# Patient Record
Sex: FEMALE | Race: WHITE | NOT HISPANIC OR LATINO | Employment: UNEMPLOYED | URBAN - NONMETROPOLITAN AREA
[De-identification: names, ages, dates, MRNs, and addresses within clinical notes are randomized per-mention and may not be internally consistent; named-entity substitution may affect disease eponyms.]

---

## 2017-01-24 ENCOUNTER — APPOINTMENT (OUTPATIENT)
Dept: PREADMISSION TESTING | Facility: HOSPITAL | Age: 42
End: 2017-01-24

## 2017-01-24 RX ORDER — LISINOPRIL 10 MG/1
10 TABLET ORAL DAILY
COMMUNITY
End: 2019-09-19

## 2017-01-27 ENCOUNTER — TRANSCRIBE ORDERS (OUTPATIENT)
Dept: INFUSION THERAPY | Facility: HOSPITAL | Age: 42
End: 2017-01-27

## 2017-01-27 DIAGNOSIS — D69.3 IMMUNE THROMBOCYTOPENIC PURPURA (HCC): Primary | ICD-10-CM

## 2017-02-01 ENCOUNTER — HOSPITAL ENCOUNTER (OUTPATIENT)
Dept: INFUSION THERAPY | Facility: HOSPITAL | Age: 42
Discharge: HOME | End: 2017-02-01
Attending: INTERNAL MEDICINE | Admitting: INTERNAL MEDICINE

## 2017-02-01 VITALS
WEIGHT: 153 LBS | DIASTOLIC BLOOD PRESSURE: 63 MMHG | HEIGHT: 67 IN | RESPIRATION RATE: 16 BRPM | BODY MASS INDEX: 24.01 KG/M2 | TEMPERATURE: 98.3 F | SYSTOLIC BLOOD PRESSURE: 106 MMHG | HEART RATE: 81 BPM

## 2017-02-01 DIAGNOSIS — D69.3 IMMUNE THROMBOCYTOPENIC PURPURA (HCC): ICD-10-CM

## 2017-02-01 LAB
ABO GROUP BLD: NORMAL
BASOPHILS # BLD AUTO: 0.01 10*3/MM3 (ref 0–0.3)
BASOPHILS NFR BLD AUTO: 0.2 % (ref 0–2)
BLD GP AB SCN SERPL QL: NEGATIVE
DEPRECATED RDW RBC AUTO: 44 FL (ref 37–54)
EOSINOPHIL # BLD AUTO: 0.03 10*3/MM3 (ref 0–0.7)
EOSINOPHIL NFR BLD AUTO: 0.6 % (ref 0–5)
ERYTHROCYTE [DISTWIDTH] IN BLOOD BY AUTOMATED COUNT: 14.3 % (ref 11.5–14.5)
HCT VFR BLD AUTO: 40.2 % (ref 37–47)
HGB BLD-MCNC: 13.7 G/DL (ref 12–16)
IMM GRANULOCYTES # BLD: 0.02 10*3/MM3 (ref 0–0.03)
IMM GRANULOCYTES NFR BLD: 0.4 % (ref 0–0.5)
LYMPHOCYTES # BLD AUTO: 1.05 10*3/MM3 (ref 1–3)
LYMPHOCYTES NFR BLD AUTO: 21.3 % (ref 21–51)
MCH RBC QN AUTO: 28.8 PG (ref 27–33)
MCHC RBC AUTO-ENTMCNC: 34.1 G/DL (ref 33–37)
MCV RBC AUTO: 84.5 FL (ref 80–94)
MONOCYTES # BLD AUTO: 0.5 10*3/MM3 (ref 0.1–0.9)
MONOCYTES NFR BLD AUTO: 10.1 % (ref 0–10)
NEUTROPHILS # BLD AUTO: 3.33 10*3/MM3 (ref 1.4–6.5)
NEUTROPHILS NFR BLD AUTO: 67.4 % (ref 30–70)
PLATELET # BLD AUTO: 135 10*3/MM3 (ref 130–400)
PLATELET # BLD AUTO: 54 10*3/MM3 (ref 130–400)
PMV BLD AUTO: 10.6 FL (ref 6–10)
RBC # BLD AUTO: 4.76 10*6/MM3 (ref 4.2–5.4)
RH BLD: NEGATIVE
WBC NRBC COR # BLD: 4.94 10*3/MM3 (ref 4.5–12.5)

## 2017-02-01 PROCEDURE — 86850 RBC ANTIBODY SCREEN: CPT

## 2017-02-01 PROCEDURE — 63710000001 DIPHENHYDRAMINE PER 50 MG: Performed by: INTERNAL MEDICINE

## 2017-02-01 PROCEDURE — 86900 BLOOD TYPING SEROLOGIC ABO: CPT

## 2017-02-01 PROCEDURE — 86901 BLOOD TYPING SEROLOGIC RH(D): CPT

## 2017-02-01 PROCEDURE — 85049 AUTOMATED PLATELET COUNT: CPT | Performed by: INTERNAL MEDICINE

## 2017-02-01 PROCEDURE — 85025 COMPLETE CBC W/AUTO DIFF WBC: CPT | Performed by: INTERNAL MEDICINE

## 2017-02-01 PROCEDURE — P9035 PLATELET PHERES LEUKOREDUCED: HCPCS

## 2017-02-01 PROCEDURE — P9037 PLATE PHERES LEUKOREDU IRRAD: HCPCS

## 2017-02-01 PROCEDURE — 96374 THER/PROPH/DIAG INJ IV PUSH: CPT

## 2017-02-01 PROCEDURE — 96375 TX/PRO/DX INJ NEW DRUG ADDON: CPT

## 2017-02-01 PROCEDURE — 36430 TRANSFUSION BLD/BLD COMPNT: CPT

## 2017-02-01 PROCEDURE — 25010000002 METHYLPREDNISOLONE PER 125 MG: Performed by: INTERNAL MEDICINE

## 2017-02-01 RX ORDER — DIPHENHYDRAMINE HCL 25 MG
25 CAPSULE ORAL ONCE
Status: COMPLETED | OUTPATIENT
Start: 2017-02-01 | End: 2017-02-01

## 2017-02-01 RX ORDER — FAMOTIDINE 10 MG/ML
20 INJECTION, SOLUTION INTRAVENOUS ONCE
Status: COMPLETED | OUTPATIENT
Start: 2017-02-01 | End: 2017-02-02

## 2017-02-01 RX ORDER — ACETAMINOPHEN 325 MG/1
650 TABLET ORAL ONCE
Status: COMPLETED | OUTPATIENT
Start: 2017-02-01 | End: 2017-02-01

## 2017-02-01 RX ORDER — METHYLPREDNISOLONE SODIUM SUCCINATE 125 MG/2ML
125 INJECTION, POWDER, LYOPHILIZED, FOR SOLUTION INTRAMUSCULAR; INTRAVENOUS ONCE
Status: COMPLETED | OUTPATIENT
Start: 2017-02-01 | End: 2017-02-01

## 2017-02-01 RX ADMIN — ACETAMINOPHEN 650 MG: 325 TABLET ORAL at 11:34

## 2017-02-01 RX ADMIN — FAMOTIDINE 20 MG: 10 INJECTION, SOLUTION INTRAVENOUS at 11:36

## 2017-02-01 RX ADMIN — METHYLPREDNISOLONE SODIUM SUCCINATE 125 MG: 125 INJECTION, POWDER, FOR SOLUTION INTRAMUSCULAR; INTRAVENOUS at 11:36

## 2017-02-01 RX ADMIN — DIPHENHYDRAMINE HYDROCHLORIDE 25 MG: 25 CAPSULE ORAL at 11:37

## 2017-02-01 NOTE — PATIENT INSTRUCTIONS
Methylprednisolone Solution for Injection  What is this medicine?  METHYLPREDNISOLONE (meth ill pred NISS oh lone) is a corticosteroid. It is commonly used to treat inflammation of the skin, joints, lungs, and other organs. Common conditions treated include asthma, allergies, and arthritis. It is also used for other conditions, such as blood disorders and diseases of the adrenal glands.  This medicine may be used for other purposes; ask your health care provider or pharmacist if you have questions.  What should I tell my health care provider before I take this medicine?  They need to know if you have any of these conditions:  -cataracts or glaucoma  -Cushing's syndrome  -heart disease  -high blood pressure  -infection including tuberculosis  -low calcium or potassium levels in the blood  -recent surgery  -seizures  -stomach or intestinal disease, including colitis  -threadworms  -thyroid problems  -an unusual or allergic reaction to methylprednisolone, corticosteroids, benzyl alcohol, other medicines, foods, dyes, or preservatives  -pregnant or trying to get pregnant  -breast-feeding  How should I use this medicine?  This medicine is for injection or infusion into a vein. It is also for injection into a muscle. It is given by a health care professional in a hospital or clinic setting.  Talk to your pediatrician regarding the use of this medicine in children. While this drug may be prescribed for selected conditions, precautions do apply.  Overdosage: If you think you have taken too much of this medicine contact a poison control center or emergency room at once.  NOTE: This medicine is only for you. Do not share this medicine with others.  What if I miss a dose?  This does not apply.  What may interact with this medicine?  Do not take this medicine with any of the following medications:  -mifepristone  This medicine may also interact with the following medications:  -aspirin and aspirin-like  medicines  -cyclosporin  -ketoconazole  -phenobarbital  -phenytoin  -rifampin  -tacrolimus  -troleandomycin  -vaccines  -warfarin  This list may not describe all possible interactions. Give your health care provider a list of all the medicines, herbs, non-prescription drugs, or dietary supplements you use. Also tell them if you smoke, drink alcohol, or use illegal drugs. Some items may interact with your medicine.  What should I watch for while using this medicine?  Visit your doctor or health care professional for regular checks on your progress. If you are taking this medicine for a long time, carry an identification card with your name and address, the type and dose of your medicine, and your doctor's name and address.  The medicine may increase your risk of getting an infection. Stay away from people who are sick. Tell your doctor or health care professional if you are around anyone with measles or chickenpox.  You may need to avoid some vaccines. Talk to your health care provider for more information.  If you are going to have surgery, tell your doctor or health care professional that you have taken this medicine within the last twelve months.  Ask your doctor or health care professional about your diet. You may need to lower the amount of salt you eat.  The medicine can increase your blood sugar. If you are a diabetic check with your doctor if you need help adjusting the dose of your diabetic medicine.  What side effects may I notice from receiving this medicine?  Side effects that you should report to your doctor or health care professional as soon as possible:  -allergic reactions like skin rash, itching or hives, swelling of the face, lips, or tongue  -bloody or tarry stools  -changes in vision  -eye pain or bulging eyes  -fever, sore throat, sneezing, cough, or other signs of infection, wounds that will not heal  -increased thirst  -irregular heartbeat  -muscle cramps  -pain in hips, back, ribs, arms,  shoulders, or legs  -swelling of the ankles, feet, hands  -trouble passing urine or change in the amount of urine  -unusual bleeding or bruising  -unusually weak or tired  -weight gain or weight loss  Side effects that usually do not require medical attention (report to your doctor or health care professional if they continue or are bothersome):  -changes in emotions or moods  -constipation or diarrhea  -headache  -irritation at site where injected  -nausea, vomiting  -skin problems, acne, thin and shiny skin  -trouble sleeping  -unusual hair growth on the face or body  This list may not describe all possible side effects. Call your doctor for medical advice about side effects. You may report side effects to FDA at 0-892-FDA-4923.  Where should I keep my medicine?  This drug is given in a hospital or clinic and will not be stored at home.  NOTE: This sheet is a summary. It may not cover all possible information. If you have questions about this medicine, talk to your doctor, pharmacist, or health care provider.     © 2016, [employer]. (2013-09-17 11:37:16)  Famotidine injection  What is this medicine?  FAMOTIDINE (fa STEPH ti noah) is a type of antihistamine that blocks the release of stomach acid. It is used to treat stomach or intestinal ulcers. It can relieve ulcer pain and discomfort, and the heartburn from acid reflux.  This medicine may be used for other purposes; ask your health care provider or pharmacist if you have questions.  What should I tell my health care provider before I take this medicine?  They need to know if you have any of these conditions:  -kidney or liver disease  -an unusual or allergic reaction to famotidine, other medicines, foods, dyes, or preservatives  -pregnant or trying to get pregnant  -breast-feeding  How should I use this medicine?  This medicine is for infusion into a vein. It is given by a health care professional in a hospital or clinic setting.  Talk to your  pediatrician regarding the use of this medicine in children. Special care may be needed.  Overdosage: If you think you have taken too much of this medicine contact a poison control center or emergency room at once.  NOTE: This medicine is only for you. Do not share this medicine with others.  What if I miss a dose?  This does not apply.  What may interact with this medicine?  -delavirdine  -itraconazole  -ketoconazole  This list may not describe all possible interactions. Give your health care provider a list of all the medicines, herbs, non-prescription drugs, or dietary supplements you use. Also tell them if you smoke, drink alcohol, or use illegal drugs. Some items may interact with your medicine.  What should I watch for while using this medicine?  Tell your doctor or health care professional if your condition does not start to get better or gets worse.  Do not take with aspirin, ibuprofen, or other antiinflammatory medicines. These can aggravate your condition.  Do not smoke cigarettes or drink alcohol. These increase irritation in your stomach and can increase the time it will take for ulcers to heal. Cigarettes and alcohol can also worsen acid reflux or heartburn.  If you get black, tarry stools or vomit up what looks like coffee grounds, call your doctor or health care professional at once. You may have a bleeding ulcer.  What side effects may I notice from receiving this medicine?  Side effects that you should report to your doctor or health care professional as soon as possible:  -allergic reactions like skin rash, itching or hives, swelling of the face, lips, or tongue  -agitation, nervousness  -confusion  -hallucinations  Side effects that usually do not require medical attention (report to your doctor or health care professional if they continue or are bothersome):  -constipation  -diarrhea  -dizziness  -headache  This list may not describe all possible side effects. Call your doctor for medical advice  about side effects. You may report side effects to FDA at 3-729-FDA-3599.  Where should I keep my medicine?  This medicine is given in a hospital or clinic. You will not be given this medicine to store at home.  NOTE: This sheet is a summary. It may not cover all possible information. If you have questions about this medicine, talk to your doctor, pharmacist, or health care provider.     © 2016, [employer]/Gold Standard. (2009-04-22 13:24:51)          Acetaminophen oral solution  What is this medicine?  ACETAMINOPHEN (a set a KERMIT nasima fen) is a pain reliever. It is used to treat mild pain and fever.  This medicine may be used for other purposes; ask your health care provider or pharmacist if you have questions.  What should I tell my health care provider before I take this medicine?  They need to know if you have any of these conditions:  -if you often drink alcohol  -liver disease  -phenylketonuria  -an unusual or allergic reaction to acetaminophen, other medicines, foods, dyes, or preservatives  -pregnant or trying to get pregnant  -breast-feeding  How should I use this medicine?  Take this medicine by mouth. This medicine comes in more than one concentration. Check the concentration on the label before every dose to make sure you are giving the right dose. Follow the directions on the package or prescription label. Use a specially marked spoon or dropper to measure each dose. Ask your pharmacist if you do not have one. Household spoons are not accurate. Do not take your medicine more often than directed.  Talk to your pediatrician regarding the use of this medicine in children. While this drug may be prescribed for children as young as 2 years old for selected conditions, precautions do apply.  Overdosage: If you think you have taken too much of this medicine contact a poison control center or emergency room at once.  NOTE: This medicine is only for you. Do not share this medicine with others.  What if I miss a  dose?  If you miss a dose, take it as soon as you can. If it is almost time for your next dose, take only that dose. Do not take double or extra doses.  What may interact with this medicine?  -alcohol  -imatinib  -isoniazid  -other medicines that contain acetaminophen  This list may not describe all possible interactions. Give your health care provider a list of all the medicines, herbs, non-prescription drugs, or dietary supplements you use. Also tell them if you smoke, drink alcohol, or use illegal drugs. Some items may interact with your medicine.  What should I watch for while using this medicine?  Tell your doctor or health care professional if the pain lasts more than 10 days (5 days for children), if it gets worse, or if there is a new or different kind of pain. Also, check with your doctor if a fever lasts for more than 3 days.  Do not take acetaminophen (Tylenol) or other medicines that contain acetaminophen with this medicine. Too much acetaminophen can be very dangerous and cause an overdose. Always read labels carefully.  Report any possible overdose to your doctor right away, even if there are no symptoms. The effects of extra doses may not be seen for many days.  What side effects may I notice from receiving this medicine?  Side effects that you should report to your doctor or health care professional as soon as possible:  -allergic reactions like skin rash, itching or hives, swelling of the face, lips, or tongue  -breathing problems  -redness, blistering, peeling or loosening of the skin, including inside the mouth  -sore throat with fever, headache, rash, nausea, or vomiting  -trouble passing urine or change in the amount of urine  -unusual bleeding or bruising  -unusually weak or tired  -yellowing of the eyes, skin  Side effects that usually do not require medical attention (report to your doctor or health care professional if they continue or are bothersome):  -headache  -nausea, stomach  upset  This list may not describe all possible side effects. Call your doctor for medical advice about side effects. You may report side effects to FDA at 1-604-ALP-4029.  Where should I keep my medicine?  Keep out of reach of children.  Store at room temperature between 20 and 25 degrees C (68 and 77 degrees F). Protect from moisture and heat. Throw away any unused medicine after the expiration date.  NOTE: This sheet is a summary. It may not cover all possible information. If you have questions about this medicine, talk to your doctor, pharmacist, or health care provider.     © 2016, [employer] Standard. (2014-08-11 13:56:24)  Diphenhydramine capsules or tablets  What is this medicine?  DIPHENHYDRAMINE (dye giorgio burleson) is an antihistamine. It is used to treat the symptoms of an allergic reaction. It is also used to treat Parkinson's disease. This medicine is also used to prevent and to treat motion sickness and as a nighttime sleep aid.  This medicine may be used for other purposes; ask your health care provider or pharmacist if you have questions.  What should I tell my health care provider before I take this medicine?  They need to know if you have any of these conditions:  -asthma or lung disease  -glaucoma  -high blood pressure or heart disease  -liver disease  -pain or difficulty passing urine  -prostate trouble  -ulcers or other stomach problems  -an unusual or allergic reaction to diphenhydramine, other medicines foods, dyes, or preservatives such as sulfites  -pregnant or trying to get pregnant  -breast-feeding  How should I use this medicine?  Take this medicine by mouth with a full glass of water. Follow the directions on the prescription label. Take your doses at regular intervals. Do not take your medicine more often than directed. To prevent motion sickness start taking this medicine 30 to 60 minutes before you leave.  Talk to your pediatrician regarding the use of this medicine in children.  Special care may be needed.  Patients over 65 years old may have a stronger reaction and need a smaller dose.  Overdosage: If you think you have taken too much of this medicine contact a poison control center or emergency room at once.  NOTE: This medicine is only for you. Do not share this medicine with others.  What if I miss a dose?  If you miss a dose, take it as soon as you can. If it is almost time for your next dose, take only that dose. Do not take double or extra doses.  What may interact with this medicine?  Do not take this medicine with any of the following medications:  -MAOIs like Carbex, Eldepryl, Marplan, Nardil, and Parnate  This medicine may also interact with the following medications:  -alcohol  -barbiturates, like phenobarbital  -medicines for bladder spasm like oxybutynin, tolterodine  -medicines for blood pressure  -medicines for depression, anxiety, or psychotic disturbances  -medicines for movement abnormalities or Parkinson's disease  -medicines for sleep  -other medicines for cold, cough or allergy  -some medicines for the stomach like chlordiazepoxide, dicyclomine  This list may not describe all possible interactions. Give your health care provider a list of all the medicines, herbs, non-prescription drugs, or dietary supplements you use. Also tell them if you smoke, drink alcohol, or use illegal drugs. Some items may interact with your medicine.  What should I watch for while using this medicine?  Visit your doctor or health care professional for regular check ups. Tell your doctor if your symptoms do not improve or if they get worse.  Your mouth may get dry. Chewing sugarless gum or sucking hard candy, and drinking plenty of water may help. Contact your doctor if the problem does not go away or is severe.  This medicine may cause dry eyes and blurred vision. If you wear contact lenses you may feel some discomfort. Lubricating drops may help. See your eye doctor if the problem does not  go away or is severe.  You may get drowsy or dizzy. Do not drive, use machinery, or do anything that needs mental alertness until you know how this medicine affects you. Do not stand or sit up quickly, especially if you are an older patient. This reduces the risk of dizzy or fainting spells. Alcohol may interfere with the effect of this medicine. Avoid alcoholic drinks.  What side effects may I notice from receiving this medicine?  Side effects that you should report to your doctor or health care professional as soon as possible:  -allergic reactions like skin rash, itching or hives, swelling of the face, lips, or tongue  -changes in vision  -confused, agitated, nervous  -irregular or fast heartbeat  -tremor  -trouble passing urine  -unusual bleeding or bruising  -unusually weak or tired  Side effects that usually do not require medical attention (report to your doctor or health care professional if they continue or are bothersome):  -constipation, diarrhea  -drowsy  -headache  -loss of appetite  -stomach upset, vomiting  -thick mucous  This list may not describe all possible side effects. Call your doctor for medical advice about side effects. You may report side effects to FDA at 0-309-FDA-1529.  Where should I keep my medicine?  Keep out of the reach of children.  Store at room temperature between 15 and 30 degrees C (59 and 86 degrees F). Keep container closed tightly. Throw away any unused medicine after the expiration date.  NOTE: This sheet is a summary. It may not cover all possible information. If you have questions about this medicine, talk to your doctor, pharmacist, or health care provider.     © 2016, [employer] Standard. (2009-04-06 17:06:22)  Platelet Transfusion   A platelet transfusion is a procedure in which you receive donated platelets through an IV tube. Platelets are tiny pieces of blood cells. When a blood vessel is damaged, platelets collect in the damaged area to help form a blood clot.  This begins the healing process. If your platelet count gets too low, your blood may have trouble clotting.   You may need a platelet transfusion if you have a condition that causes a low number of platelets (thrombocytopenia). A platelet transfusion may be used to stop or prevent bleeding.   LET YOUR HEALTH CARE PROVIDER KNOW ABOUT:   · Any allergies you have.    · All medicines you are taking, including vitamins, herbs, eye drops, creams, and over-the-counter medicines.    · Previous problems you or members of your family have had with the use of anesthetics.    · Any blood disorders you have.    · Previous surgeries you have had.    · Any medical conditions you may have.    · Any reactions you have had during a previous transfusion.  RISKS AND COMPLICATIONS  Generally, this is a safe procedure. However, problems may occur, including:   · Fever with or without chills. The fever usually occurs within the first 4 hours of the transfusion and returns to normal within 48 hours.  · Allergic reaction. The reaction is most commonly caused by antibodies your body creates against substances in the transfusion. Signs of an allergic reaction may include itching, hives, difficulty breathing, shock, or low blood pressure.  · Sudden (acute) or delayed hemolytic reaction. This rare reaction can occur during the transfusion and up to 28 days after the transfusion. The reaction usually occurs when your body's defense system (immune system) attacks the new platelets. Signs of a hemolytic reaction may include fever, headache, difficulty breathing, low blood pressure, a rapid heartbeat, or pain in your back, abdomen, chest, or IV site.  · Transfusion-related acute lung injury (TRALI). TRALI can occur within hours of a transfusion, or several days later.  This is a rare reaction that causes lung damage. The cause is not known.  · Infection. Signs of this rare complication may include fever, chills, vomiting, a rapid heartbeat, or low  blood pressure.   BEFORE THE PROCEDURE   · You may have a blood test to determine your blood type. This is necessary to find out what kind of platelets best matches your platelets.  · If you have had an allergic reaction to a transfusion in the past, you may be given medicine to help prevent a reaction. Take this medicine only as directed by your health care provider.  · Your temperature, blood pressure, and pulse will be monitored before the transfusion.  PROCEDURE  · An IV will be started in your hand or arm.  · The transfusion will be attached to your IV tubing. The bag of donated platelets will be attached to your IV tube and given into your vein.  · Your temperature, blood pressure, and pulse will be monitored regularly during the transfusion. This monitoring is done to help detect early signs of a transfusion reaction.  · If you have any signs or symptoms of a reaction, your transfusion will be stopped and you may be given medicine.  · When your transfusion is complete, your IV will be removed.  · Pressure may be applied to the IV site for a few minutes.  · A bandage (dressing) will be applied.  The procedure may vary among health care providers and hospitals.   AFTER THE PROCEDURE  · Your blood pressure, temperature, and pulse will be monitored regularly.     This information is not intended to replace advice given to you by your health care provider. Make sure you discuss any questions you have with your health care provider.     Document Released: 10/14/2008 Document Revised: 01/08/2016 Document Reviewed: 10/28/2015  [employer] Patient Education ©2016 [employer]

## 2017-02-02 ENCOUNTER — HOSPITAL ENCOUNTER (OUTPATIENT)
Facility: HOSPITAL | Age: 42
Setting detail: HOSPITAL OUTPATIENT SURGERY
Discharge: HOME | End: 2017-02-02
Attending: OBSTETRICS & GYNECOLOGY | Admitting: OBSTETRICS & GYNECOLOGY

## 2017-02-02 ENCOUNTER — ANESTHESIA EVENT (OUTPATIENT)
Dept: PERIOP | Facility: HOSPITAL | Age: 42
End: 2017-02-02

## 2017-02-02 ENCOUNTER — ANESTHESIA (OUTPATIENT)
Dept: PERIOP | Facility: HOSPITAL | Age: 42
End: 2017-02-02

## 2017-02-02 ENCOUNTER — APPOINTMENT (OUTPATIENT)
Dept: PREADMISSION TESTING | Facility: HOSPITAL | Age: 42
End: 2017-02-02

## 2017-02-02 VITALS
HEART RATE: 64 BPM | BODY MASS INDEX: 24.01 KG/M2 | DIASTOLIC BLOOD PRESSURE: 84 MMHG | HEIGHT: 67 IN | RESPIRATION RATE: 16 BRPM | SYSTOLIC BLOOD PRESSURE: 142 MMHG | WEIGHT: 153 LBS | TEMPERATURE: 98 F | OXYGEN SATURATION: 99 %

## 2017-02-02 DIAGNOSIS — N93.9 ABNORMAL UTERINE BLEEDING: ICD-10-CM

## 2017-02-02 LAB
ABO + RH BLD: NORMAL
ABO + RH BLD: NORMAL
B-HCG UR QL: NEGATIVE
BH BB BLOOD EXPIRATION DATE: NORMAL
BH BB BLOOD EXPIRATION DATE: NORMAL
BH BB BLOOD TYPE BARCODE: 6200
BH BB BLOOD TYPE BARCODE: 6200
BH BB DISPENSE STATUS: NORMAL
BH BB DISPENSE STATUS: NORMAL
BH BB PRODUCT CODE: NORMAL
BH BB PRODUCT CODE: NORMAL
BH BB UNIT NUMBER: NORMAL
BH BB UNIT NUMBER: NORMAL
INTERNAL NEGATIVE CONTROL: NEGATIVE
INTERNAL POSITIVE CONTROL: POSITIVE
Lab: NORMAL
UNIT  ABO: NORMAL
UNIT  ABO: NORMAL
UNIT  RH: NORMAL
UNIT  RH: NORMAL

## 2017-02-02 PROCEDURE — 25010000002 MIDAZOLAM PER 1 MG: Performed by: NURSE ANESTHETIST, CERTIFIED REGISTERED

## 2017-02-02 PROCEDURE — 25010000002 DEXAMETHASONE PER 1 MG: Performed by: NURSE ANESTHETIST, CERTIFIED REGISTERED

## 2017-02-02 PROCEDURE — 25010000002 FENTANYL CITRATE (PF) 100 MCG/2ML SOLUTION: Performed by: NURSE ANESTHETIST, CERTIFIED REGISTERED

## 2017-02-02 PROCEDURE — 25010000002 PROPOFOL 10 MG/ML EMULSION: Performed by: NURSE ANESTHETIST, CERTIFIED REGISTERED

## 2017-02-02 PROCEDURE — 25010000002 ONDANSETRON PER 1 MG: Performed by: NURSE ANESTHETIST, CERTIFIED REGISTERED

## 2017-02-02 RX ORDER — MAGNESIUM HYDROXIDE 1200 MG/15ML
LIQUID ORAL AS NEEDED
Status: DISCONTINUED | OUTPATIENT
Start: 2017-02-02 | End: 2017-02-02 | Stop reason: HOSPADM

## 2017-02-02 RX ORDER — SODIUM CHLORIDE 0.9 % (FLUSH) 0.9 %
1-10 SYRINGE (ML) INJECTION AS NEEDED
Status: DISCONTINUED | OUTPATIENT
Start: 2017-02-02 | End: 2017-02-02 | Stop reason: HOSPADM

## 2017-02-02 RX ORDER — DEXAMETHASONE SODIUM PHOSPHATE 10 MG/ML
INJECTION INTRAMUSCULAR; INTRAVENOUS AS NEEDED
Status: DISCONTINUED | OUTPATIENT
Start: 2017-02-02 | End: 2017-02-02 | Stop reason: SURG

## 2017-02-02 RX ORDER — FENTANYL CITRATE 50 UG/ML
INJECTION, SOLUTION INTRAMUSCULAR; INTRAVENOUS AS NEEDED
Status: DISCONTINUED | OUTPATIENT
Start: 2017-02-02 | End: 2017-02-02 | Stop reason: SURG

## 2017-02-02 RX ORDER — MEPERIDINE HYDROCHLORIDE 25 MG/ML
12.5 INJECTION INTRAMUSCULAR; INTRAVENOUS; SUBCUTANEOUS
Status: DISCONTINUED | OUTPATIENT
Start: 2017-02-02 | End: 2017-02-02 | Stop reason: HOSPADM

## 2017-02-02 RX ORDER — SODIUM CHLORIDE 9 MG/ML
INJECTION, SOLUTION INTRAVENOUS AS NEEDED
Status: DISCONTINUED | OUTPATIENT
Start: 2017-02-02 | End: 2017-02-02 | Stop reason: HOSPADM

## 2017-02-02 RX ORDER — PROPOFOL 10 MG/ML
VIAL (ML) INTRAVENOUS AS NEEDED
Status: DISCONTINUED | OUTPATIENT
Start: 2017-02-02 | End: 2017-02-02 | Stop reason: SURG

## 2017-02-02 RX ORDER — SODIUM CHLORIDE, SODIUM LACTATE, POTASSIUM CHLORIDE, CALCIUM CHLORIDE 600; 310; 30; 20 MG/100ML; MG/100ML; MG/100ML; MG/100ML
125 INJECTION, SOLUTION INTRAVENOUS CONTINUOUS
Status: DISCONTINUED | OUTPATIENT
Start: 2017-02-02 | End: 2017-02-02 | Stop reason: HOSPADM

## 2017-02-02 RX ORDER — MIDAZOLAM HYDROCHLORIDE 1 MG/ML
INJECTION INTRAMUSCULAR; INTRAVENOUS AS NEEDED
Status: DISCONTINUED | OUTPATIENT
Start: 2017-02-02 | End: 2017-02-02 | Stop reason: SURG

## 2017-02-02 RX ORDER — SODIUM CHLORIDE, SODIUM LACTATE, POTASSIUM CHLORIDE, CALCIUM CHLORIDE 600; 310; 30; 20 MG/100ML; MG/100ML; MG/100ML; MG/100ML
INJECTION, SOLUTION INTRAVENOUS CONTINUOUS PRN
Status: DISCONTINUED | OUTPATIENT
Start: 2017-02-02 | End: 2017-02-02 | Stop reason: SURG

## 2017-02-02 RX ORDER — FENTANYL CITRATE 50 UG/ML
50 INJECTION, SOLUTION INTRAMUSCULAR; INTRAVENOUS
Status: DISCONTINUED | OUTPATIENT
Start: 2017-02-02 | End: 2017-02-02 | Stop reason: HOSPADM

## 2017-02-02 RX ORDER — LIDOCAINE HYDROCHLORIDE 20 MG/ML
INJECTION, SOLUTION INFILTRATION; PERINEURAL AS NEEDED
Status: DISCONTINUED | OUTPATIENT
Start: 2017-02-02 | End: 2017-02-02 | Stop reason: SURG

## 2017-02-02 RX ORDER — ONDANSETRON 2 MG/ML
4 INJECTION INTRAMUSCULAR; INTRAVENOUS ONCE AS NEEDED
Status: DISCONTINUED | OUTPATIENT
Start: 2017-02-02 | End: 2017-02-02 | Stop reason: HOSPADM

## 2017-02-02 RX ORDER — ONDANSETRON 2 MG/ML
INJECTION INTRAMUSCULAR; INTRAVENOUS AS NEEDED
Status: DISCONTINUED | OUTPATIENT
Start: 2017-02-02 | End: 2017-02-02 | Stop reason: SURG

## 2017-02-02 RX ORDER — OXYCODONE HYDROCHLORIDE AND ACETAMINOPHEN 5; 325 MG/1; MG/1
1 TABLET ORAL ONCE AS NEEDED
Status: DISCONTINUED | OUTPATIENT
Start: 2017-02-02 | End: 2017-02-02 | Stop reason: HOSPADM

## 2017-02-02 RX ORDER — IPRATROPIUM BROMIDE AND ALBUTEROL SULFATE 2.5; .5 MG/3ML; MG/3ML
3 SOLUTION RESPIRATORY (INHALATION) ONCE AS NEEDED
Status: DISCONTINUED | OUTPATIENT
Start: 2017-02-02 | End: 2017-02-02 | Stop reason: HOSPADM

## 2017-02-02 RX ADMIN — SODIUM CHLORIDE, POTASSIUM CHLORIDE, SODIUM LACTATE AND CALCIUM CHLORIDE: 600; 310; 30; 20 INJECTION, SOLUTION INTRAVENOUS at 11:00

## 2017-02-02 RX ADMIN — FENTANYL CITRATE 50 MCG: 50 INJECTION INTRAMUSCULAR; INTRAVENOUS at 11:05

## 2017-02-02 RX ADMIN — MIDAZOLAM HYDROCHLORIDE 2 MG: 1 INJECTION, SOLUTION INTRAMUSCULAR; INTRAVENOUS at 10:00

## 2017-02-02 RX ADMIN — FENTANYL CITRATE 100 MCG: 50 INJECTION INTRAMUSCULAR; INTRAVENOUS at 10:00

## 2017-02-02 RX ADMIN — FENTANYL CITRATE 50 MCG: 50 INJECTION INTRAMUSCULAR; INTRAVENOUS at 11:00

## 2017-02-02 RX ADMIN — PROPOFOL 150 MG: 10 INJECTION, EMULSION INTRAVENOUS at 10:05

## 2017-02-02 RX ADMIN — SODIUM CHLORIDE, POTASSIUM CHLORIDE, SODIUM LACTATE AND CALCIUM CHLORIDE: 600; 310; 30; 20 INJECTION, SOLUTION INTRAVENOUS at 10:00

## 2017-02-02 RX ADMIN — FAMOTIDINE 20 MG: 10 INJECTION, SOLUTION INTRAVENOUS at 10:05

## 2017-02-02 RX ADMIN — DEXAMETHASONE SODIUM PHOSPHATE 4 MG: 10 INJECTION INTRAMUSCULAR; INTRAVENOUS at 10:05

## 2017-02-02 RX ADMIN — FENTANYL CITRATE 50 MCG: 50 INJECTION, SOLUTION INTRAMUSCULAR; INTRAVENOUS at 11:38

## 2017-02-02 RX ADMIN — LIDOCAINE HYDROCHLORIDE 60 MG: 20 INJECTION, SOLUTION INFILTRATION; PERINEURAL at 10:05

## 2017-02-02 RX ADMIN — ONDANSETRON 4 MG: 2 INJECTION, SOLUTION INTRAMUSCULAR; INTRAVENOUS at 10:05

## 2017-02-02 NOTE — ANESTHESIA PREPROCEDURE EVALUATION
Anesthesia Evaluation     Patient summary reviewed and Nursing notes reviewed    No history of anesthetic complications   Airway   Mallampati: I  TM distance: >3 FB  Neck ROM: full  no difficulty expected  Dental - normal exam     Pulmonary - normal exam    breath sounds clear to auscultation  (+) pulmonary embolism (15 yrs ago...pt on xarelto...last taken 2/1/2017),   Cardiovascular - normal exam  Exercise tolerance: good (4-7 METS)  (+) hypertension,     NYHA Classification: II  Rhythm: regular  Rate: normal    Neuro/Psych- negative ROS  GI/Hepatic/Renal/Endo - negative ROS     Musculoskeletal     (+) back pain (left LE radiculopathy),   Abdominal  - normal exam    Abdomen: soft.  Bowel sounds: normal.   Substance History - negative use     OB/GYN negative ob/gyn ROS         Other   (+) blood dyscrasia (H/o ITP...had platelet transfusion yesterday..PLTS 134..pt with h/o PE...took xarelto 2/1/2017)                          Anesthesia Plan    ASA 3     general   (ASA III due to ITP and h/o PE)  intravenous induction   Anesthetic plan and risks discussed with patient.    Plan discussed with CRNA.

## 2017-02-02 NOTE — PLAN OF CARE
Problem: Perioperative Period (Adult)  Goal: Signs and Symptoms of Listed Potential Problems Will be Absent or Manageable (Perioperative Period)  Outcome: Ongoing (interventions implemented as appropriate)    02/02/17 0922   Perioperative Period   Problems Assessed (Perioperative Period) all   Problems Present (Perioperative Period) none

## 2017-02-02 NOTE — DISCHARGE INSTRUCTIONS
Endometrial ablation is a procedure that surgically destroys (ablates) the lining of your uterus (endometrium). The goal of endometrial ablation is to reduce menstrual flow. In some women, menstrual flow may stop completely.      Follow up with Dr. White on February 17th 2017 at 0910 am.

## 2017-02-02 NOTE — H&P
Chief complaint Abnormal uterine bleeding    History of Present Illness: Patient is a 41 y.o. female who presents for a hysteroscopy, possible D&C, endometrial ablation.       Past Medical History   Diagnosis Date   • Chronic ITP (idiopathic thrombocytopenia)    • Clotting disorder    • Hypertension    • Pulmonary emboli        Past Surgical History   Procedure Laterality Date   •  section       x2   • Abdominal surgery     • Leep       x2   • Fracture surgery Left      left foot ligament repair       No family history on file.    Social History   Substance Use Topics   • Smoking status: Never Smoker   • Smokeless tobacco: Never Used   • Alcohol use Yes      Comment: socially       Prescriptions Prior to Admission   Medication Sig Dispense Refill Last Dose   • B Complex Vitamins (B COMPLEX-B12 PO) Take  by mouth Daily.   2017 at 1400   • lisinopril (PRINIVIL,ZESTRIL) 10 MG tablet Take 10 mg by mouth Daily.   2017 at 1400   • rivaroxaban (XARELTO) 20 MG tablet Take 20 mg by mouth Daily.   2017 at 1400       Allergies:  Review of patient's allergies indicates no known allergies.      Vital Signs         Review of Systems    The following systems were reviewed and negative;  ENT, respiratory, cardiovascular, gastrointestinal, genitourinary, breast, endocrine and allergies / immunologic.      Physical Exam:      General Appearance:    Alert, cooperative, in no acute distress   Head:    Normocephalic, without obvious abnormality, atraumatic   Eyes:            Lids and lashes normal, conjunctivae and sclerae normal, no   icterus, no pallor, corneas clear, PERRLA   Ears:    Ears appear intact with no abnormalities noted   Throat:   No oral lesions, no thrush, oral mucosa moist   Neck:   No adenopathy, supple, trachea midline, no thyromegaly, no     carotid bruit, no JVD   Back:     No kyphosis present, no scoliosis present, no skin lesions,       erythema or scars, no tenderness to percussion or                    palpation,   range of motion normal   Lungs:     Clear to auscultation,respirations regular, even and                   unlabored    Heart:    Regular rhythm and normal rate, normal S1 and S2, no            murmur, no gallop, no rub, no click   Breast Exam:    Deferred   Abdomen:     Normal bowel sounds, no masses, no organomegaly, soft        non-tender, non-distended, no guarding, no rebound                 tenderness   Genitalia:    Deferred   Extremities:   Moves all extremities well, no edema, no cyanosis, no              redness   Pulses:   Pulses palpable and equal bilaterally   Skin:   No bleeding, bruising or rash   Lymph nodes:   No palpable adenopathy   Neurologic:   Cranial nerves 2 - 12 grossly intact, sensation intact, DTR        present and equal bilaterally         Assessment: Patient is a 41 y.o. female who presents with abnormal uterine bleeding.    Plan of Care: Proceed with hysteroscopy, possible dilation and curettage, Novasure ablation.      Gail White DO  02/02/17  8:22 AM

## 2017-02-02 NOTE — ANESTHESIA POSTPROCEDURE EVALUATION
Patient: Emily Zavala    Procedure Summary     Date Anesthesia Start Anesthesia Stop Room / Location    02/02/17 1000 1113 [facility] / [facility]       Procedure Diagnosis Surgeon Provider    DILATATION AND CURETTAGE, HYSTEROSCOPY, ABLATION(PT.GETTING PLATELETS DAY BEFORE SURGERY) OUT PT.SURGERY TO CHECK PLATELETS DAY OF SURGERY (N/A Vagina) (N93.9) DO Jai Nguyễn,           Anesthesia Type: general  Last vitals  /90 (02/02/17 1143)    Temp 97.9 °F (36.6 °C) (02/02/17 1143)    Pulse 62 (02/02/17 1143)   Resp 14 (02/02/17 1143)    SpO2 100 % (02/02/17 1143)      Post Anesthesia Care and Evaluation    Patient location during evaluation: PHASE II  Patient participation: complete - patient participated  Level of consciousness: awake and alert  Pain score: 1  Pain management: adequate  Airway patency: patent  Anesthetic complications: No anesthetic complications  PONV Status: controlled  Cardiovascular status: acceptable  Respiratory status: acceptable  Hydration status: acceptable

## 2017-02-02 NOTE — PLAN OF CARE
Problem: Patient Care Overview (Adult)  Goal: Plan of Care Review  Outcome: Ongoing (interventions implemented as appropriate)    02/02/17 5080   Coping/Psychosocial Response Interventions   Plan Of Care Reviewed With patient   Patient Care Overview   Progress progress toward functional goals as expected

## 2017-02-02 NOTE — PLAN OF CARE
Problem: Patient Care Overview (Adult)  Goal: Discharge Needs Assessment  Outcome: Ongoing (interventions implemented as appropriate)    02/02/17 0804   Discharge Needs Assessment   Concerns To Be Addressed no discharge needs identified   Readmission Within The Last 30 Days no previous admission in last 30 days   Equipment Needed After Discharge none   Discharge Disposition home or self-care   Current Health   Anticipated Changes Related to Illness none   Self-Care   Equipment Currently Used at Home none   Living Environment   Transportation Available car;family or friend will provide

## 2017-02-02 NOTE — PLAN OF CARE
Problem: Patient Care Overview (Adult)  Goal: Adult Individualization and Mutuality  Outcome: Ongoing (interventions implemented as appropriate)    02/02/17 0873   Mutuality/Individual Preferences   What Anxieties, Fears or Concerns Do You Have About Your Health or Care? PAIN CONTROL

## 2017-02-02 NOTE — NURSING NOTE
DR. BRIAN AT [facility], STATES PT DOES NOT NEED A PLATELET COUNT DRAW PRIOR TO SURGERY.  DR. LÓPEZ  AWARE

## 2017-02-02 NOTE — OP NOTE
Novasure Ablation Operation Note    Emily Zavala  2/2/2017    Pre-op Diagnosis:   N93.9    Post-op Diagnosis:     Same    Procedure(s):  DILATATION AND CURETTAGE, HYSTEROSCOPY, ABLATION(PT.GETTING PLATELETS DAY BEFORE SURGERY) OUT PT.SURGERY TO CHECK PLATELETS DAY OF SURGERY     Surgeon(s):  DO Jared Nguyễn MD    Anesthesia: General    Staff:   Circulator: Krystyna Girard RN  Scrub Person: Teresa Lisa CSA  Other: Silvia Donato    Estimated Blood Loss: None    Specimens:                None      Procedure     The patient was prepped and draped in normal sterile fashion. The cervix was gently dilated. Severe cervical stenosis was noted. Hysteroscope was placed. There were no areas of hypertrophic endometrium. A Novasure device was inserted and deployed. The ablation lasted approximately 90 seconds. The hysteroscope was reinserted and the lining was ablated with a proper delores. The patient tolerated the procedure and went to recovery room in stable condition.       Gail White DO     Date: 2/2/2017  Time: 11:24 AM

## 2017-02-02 NOTE — ANESTHESIA PROCEDURE NOTES
Airway  Urgency: elective    Date/Time: 2/2/2017 10:06 AM  Airway not difficult    General Information and Staff    Patient location during procedure: OR  Anesthesiologist: ALIREZA LÓPEZ  CRNA: SWAPNA MURCIA    Indications and Patient Condition  Indications for airway management: airway protection    Preoxygenated: yes  MILS maintained throughout  Mask difficulty assessment: 0 - not attempted    Final Airway Details  Final airway type: supraglottic airway      Successful airway: unique  Size 4    Number of attempts at approach: 1    Additional Comments  Dentition & lips unchanged from preop

## 2017-12-12 ENCOUNTER — HOSPITAL ENCOUNTER (EMERGENCY)
Facility: HOSPITAL | Age: 42
Discharge: HOME | End: 2017-12-12
Attending: EMERGENCY MEDICINE | Admitting: EMERGENCY MEDICINE

## 2017-12-12 ENCOUNTER — APPOINTMENT (OUTPATIENT)
Dept: GENERAL RADIOLOGY | Facility: HOSPITAL | Age: 42
End: 2017-12-12

## 2017-12-12 ENCOUNTER — APPOINTMENT (OUTPATIENT)
Dept: CT IMAGING | Facility: HOSPITAL | Age: 42
End: 2017-12-12

## 2017-12-12 VITALS
TEMPERATURE: 97.6 F | DIASTOLIC BLOOD PRESSURE: 63 MMHG | WEIGHT: 152 LBS | HEIGHT: 67 IN | BODY MASS INDEX: 23.86 KG/M2 | HEART RATE: 79 BPM | RESPIRATION RATE: 17 BRPM | SYSTOLIC BLOOD PRESSURE: 106 MMHG | OXYGEN SATURATION: 99 %

## 2017-12-12 DIAGNOSIS — R07.89 ATYPICAL CHEST PAIN: Primary | ICD-10-CM

## 2017-12-12 LAB
ANION GAP SERPL CALCULATED.3IONS-SCNC: 7.6 MMOL/L (ref 3.6–11.2)
BASOPHILS # BLD AUTO: 0.02 10*3/MM3 (ref 0–0.3)
BASOPHILS NFR BLD AUTO: 0.1 % (ref 0–2)
BUN BLD-MCNC: 11 MG/DL (ref 7–21)
BUN/CREAT SERPL: 11.2 (ref 7–25)
CALCIUM SPEC-SCNC: 8.8 MG/DL (ref 7.7–10)
CHLORIDE SERPL-SCNC: 110 MMOL/L (ref 99–112)
CO2 SERPL-SCNC: 20.4 MMOL/L (ref 24.3–31.9)
CREAT BLD-MCNC: 0.98 MG/DL (ref 0.43–1.29)
D DIMER PPP FEU-MCNC: 5.37 MCGFEU/ML (ref 0–0.5)
DEPRECATED RDW RBC AUTO: 43.3 FL (ref 37–54)
EOSINOPHIL # BLD AUTO: 0.03 10*3/MM3 (ref 0–0.7)
EOSINOPHIL NFR BLD AUTO: 0.2 % (ref 0–5)
ERYTHROCYTE [DISTWIDTH] IN BLOOD BY AUTOMATED COUNT: 13.6 % (ref 11.5–14.5)
GFR SERPL CREATININE-BSD FRML MDRD: 63 ML/MIN/1.73
GLUCOSE BLD-MCNC: 226 MG/DL (ref 70–110)
HCT VFR BLD AUTO: 38.1 % (ref 37–47)
HGB BLD-MCNC: 12.5 G/DL (ref 12–16)
IMM GRANULOCYTES # BLD: 0.64 10*3/MM3 (ref 0–0.03)
IMM GRANULOCYTES NFR BLD: 4.4 % (ref 0–0.5)
LYMPHOCYTES # BLD AUTO: 0.22 10*3/MM3 (ref 1–3)
LYMPHOCYTES NFR BLD AUTO: 1.5 % (ref 21–51)
MCH RBC QN AUTO: 28.5 PG (ref 27–33)
MCHC RBC AUTO-ENTMCNC: 32.8 G/DL (ref 33–37)
MCV RBC AUTO: 86.8 FL (ref 80–94)
MONOCYTES # BLD AUTO: 0.5 10*3/MM3 (ref 0.1–0.9)
MONOCYTES NFR BLD AUTO: 3.4 % (ref 0–10)
NEUTROPHILS # BLD AUTO: 13.28 10*3/MM3 (ref 1.4–6.5)
NEUTROPHILS NFR BLD AUTO: 90.4 % (ref 30–70)
OSMOLALITY SERPL CALC.SUM OF ELEC: 282.2 MOSM/KG (ref 273–305)
PLATELET # BLD AUTO: 165 10*3/MM3 (ref 130–400)
PMV BLD AUTO: 10.6 FL (ref 6–10)
POTASSIUM BLD-SCNC: 3.7 MMOL/L (ref 3.5–5.3)
RBC # BLD AUTO: 4.39 10*6/MM3 (ref 4.2–5.4)
SODIUM BLD-SCNC: 138 MMOL/L (ref 135–153)
TROPONIN I SERPL-MCNC: <0.006 NG/ML
WBC NRBC COR # BLD: 14.69 10*3/MM3 (ref 4.5–12.5)

## 2017-12-12 PROCEDURE — 85379 FIBRIN DEGRADATION QUANT: CPT | Performed by: EMERGENCY MEDICINE

## 2017-12-12 PROCEDURE — 71010 HC CHEST PA OR AP: CPT

## 2017-12-12 PROCEDURE — 71275 CT ANGIOGRAPHY CHEST: CPT | Performed by: RADIOLOGY

## 2017-12-12 PROCEDURE — 0 IOPAMIDOL PER 1 ML: Performed by: EMERGENCY MEDICINE

## 2017-12-12 PROCEDURE — 80048 BASIC METABOLIC PNL TOTAL CA: CPT | Performed by: EMERGENCY MEDICINE

## 2017-12-12 PROCEDURE — 71275 CT ANGIOGRAPHY CHEST: CPT

## 2017-12-12 PROCEDURE — 84484 ASSAY OF TROPONIN QUANT: CPT | Performed by: EMERGENCY MEDICINE

## 2017-12-12 PROCEDURE — 71010 XR CHEST 1 VW: CPT | Performed by: RADIOLOGY

## 2017-12-12 PROCEDURE — 85025 COMPLETE CBC W/AUTO DIFF WBC: CPT | Performed by: EMERGENCY MEDICINE

## 2017-12-12 PROCEDURE — 99284 EMERGENCY DEPT VISIT MOD MDM: CPT

## 2017-12-12 RX ORDER — IBUPROFEN 600 MG/1
600 TABLET ORAL ONCE
Status: COMPLETED | OUTPATIENT
Start: 2017-12-12 | End: 2017-12-12

## 2017-12-12 RX ADMIN — IBUPROFEN 600 MG: 600 TABLET ORAL at 17:23

## 2017-12-12 RX ADMIN — IOPAMIDOL 100 ML: 755 INJECTION, SOLUTION INTRAVENOUS at 16:50

## 2017-12-12 NOTE — DISCHARGE INSTRUCTIONS

## 2017-12-12 NOTE — ED NOTES
PT WAS GIVEN PEPCID 20MG IVP, BENADRYL 50MG IVP, DECADRON 20MG IVP, SOLUMEDROL 125 MG IVP AT CLINIC     Felicia Lu, CONNER  12/12/17 1621

## 2017-12-13 NOTE — ED PROVIDER NOTES
Subjective   HPI Comments: Was receiving platlet infusion when started having CP radiating to back. Felt like previous PE 14 years ago she had during childbirth. Still on Xarelto.    Patient is a 42 y.o. female presenting with chest pain.   History provided by:  Patient   used: No    Chest Pain   Pain location:  R lateral chest and L lateral chest  Pain quality: aching and sharp    Pain radiates to:  Mid back  Pain severity:  Mild  Onset quality:  Sudden  Duration:  1 hour  Timing:  Constant  Progression:  Worsening  Chronicity:  New  Context: at rest    Relieved by:  None tried  Worsened by:  Nothing  Ineffective treatments:  None tried  Associated symptoms: anxiety    Associated symptoms: no abdominal pain and no fever    Risk factors: prior DVT/PE        Review of Systems   Constitutional: Negative.  Negative for fever.   HENT: Negative.    Respiratory: Negative.    Cardiovascular: Positive for chest pain.   Gastrointestinal: Negative.  Negative for abdominal pain.   Endocrine: Negative.    Genitourinary: Negative.  Negative for dysuria.   Skin: Negative.    Neurological: Negative.    Psychiatric/Behavioral: Negative.    All other systems reviewed and are negative.      Past Medical History:   Diagnosis Date   • Bulging lumbar disc    • Chronic ITP (idiopathic thrombocytopenia)    • Clotting disorder    • Hypertension    • Pulmonary emboli        No Known Allergies    Past Surgical History:   Procedure Laterality Date   • ABDOMINAL SURGERY     •  SECTION      x2   • D&C HYSTEROSCOPY ENDOMETRIAL ABLATION N/A 2017    Procedure: DILATATION AND CURETTAGE, HYSTEROSCOPY, ABLATION(PT.GETTING PLATELETS DAY BEFORE SURGERY) OUT PT.SURGERY TO CHECK PLATELETS DAY OF SURGERY;  Surgeon: Gail White DO;  Location: [facility];  Service:    • FRACTURE SURGERY Left     left foot ligament repair   • LEEP      x2       History reviewed. No pertinent family history.    Social History      Social History   • Marital status:      Spouse name: N/A   • Number of children: N/A   • Years of education: N/A     Social History Main Topics   • Smoking status: Never Smoker   • Smokeless tobacco: Never Used   • Alcohol use No   • Drug use: No   • Sexual activity: Defer     Other Topics Concern   • None     Social History Narrative           Objective   Physical Exam   Constitutional: She is oriented to person, place, and time. She appears well-developed and well-nourished. No distress.   HENT:   Head: Normocephalic and atraumatic.   Right Ear: External ear normal.   Left Ear: External ear normal.   Nose: Nose normal.   Eyes: Conjunctivae and EOM are normal. Pupils are equal, round, and reactive to light.   Neck: Normal range of motion. Neck supple. No JVD present. No tracheal deviation present.   Cardiovascular: Normal rate, regular rhythm and normal heart sounds.    No murmur heard.  Pulmonary/Chest: Effort normal and breath sounds normal. No respiratory distress. She has no wheezes.   Abdominal: Soft. Bowel sounds are normal. There is no tenderness.   Musculoskeletal: Normal range of motion. She exhibits no edema or deformity.   Neurological: She is alert and oriented to person, place, and time. No cranial nerve deficit.   Skin: Skin is warm and dry. No rash noted. She is not diaphoretic. No erythema. No pallor.   Psychiatric: She has a normal mood and affect. Her behavior is normal. Thought content normal.   Nursing note and vitals reviewed.      Procedures  CT Chest Pulmonary Embolism With Contrast   Final Result   No evidence of a pulmonary embolus.       This report was finalized on 12/12/2017 4:58 PM by Dr. Amanuel Lindo MD.          XR Chest 1 View   Final Result   No evidence of active or acute cardiopulmonary disease on today's chest   radiograph.           This report was finalized on 12/12/2017 3:32 PM by Dr. Amanuel Lindo MD.                   ED Course  ED Course                   MDM    Final diagnoses:   Atypical chest pain            Nir Keys MD  12/12/17 6534

## 2018-03-07 ENCOUNTER — TRANSCRIBE ORDERS (OUTPATIENT)
Dept: ADMINISTRATIVE | Facility: HOSPITAL | Age: 43
End: 2018-03-07

## 2018-03-07 DIAGNOSIS — Z12.31 VISIT FOR SCREENING MAMMOGRAM: Primary | ICD-10-CM

## 2018-03-20 ENCOUNTER — APPOINTMENT (OUTPATIENT)
Dept: CT IMAGING | Facility: HOSPITAL | Age: 43
End: 2018-03-20

## 2018-03-20 ENCOUNTER — HOSPITAL ENCOUNTER (EMERGENCY)
Facility: HOSPITAL | Age: 43
Discharge: HOME | End: 2018-03-20
Attending: EMERGENCY MEDICINE | Admitting: EMERGENCY MEDICINE

## 2018-03-20 VITALS
SYSTOLIC BLOOD PRESSURE: 140 MMHG | OXYGEN SATURATION: 100 % | BODY MASS INDEX: 24.64 KG/M2 | WEIGHT: 157 LBS | HEIGHT: 67 IN | HEART RATE: 60 BPM | DIASTOLIC BLOOD PRESSURE: 80 MMHG | RESPIRATION RATE: 16 BRPM | TEMPERATURE: 97.8 F

## 2018-03-20 DIAGNOSIS — D69.3 ACUTE ITP (HCC): Primary | ICD-10-CM

## 2018-03-20 DIAGNOSIS — R10.9 RIGHT SIDED ABDOMINAL PAIN: ICD-10-CM

## 2018-03-20 LAB
6-ACETYL MORPHINE: NEGATIVE
ALBUMIN SERPL-MCNC: 4.3 G/DL (ref 3.5–5)
ALBUMIN/GLOB SERPL: 1.3 G/DL (ref 1.5–2.5)
ALP SERPL-CCNC: 65 U/L (ref 35–104)
ALT SERPL W P-5'-P-CCNC: 100 U/L (ref 10–36)
AMPHET+METHAMPHET UR QL: NEGATIVE
ANION GAP SERPL CALCULATED.3IONS-SCNC: 6.6 MMOL/L (ref 3.6–11.2)
AST SERPL-CCNC: 86 U/L (ref 10–30)
B-HCG UR QL: NEGATIVE
BACTERIA UR QL AUTO: NORMAL /HPF
BARBITURATES UR QL SCN: NEGATIVE
BASOPHILS # BLD AUTO: 0.01 10*3/MM3 (ref 0–0.3)
BASOPHILS NFR BLD AUTO: 0.2 % (ref 0–2)
BENZODIAZ UR QL SCN: NEGATIVE
BILIRUB SERPL-MCNC: 0.6 MG/DL (ref 0.2–1.8)
BILIRUB UR QL STRIP: NEGATIVE
BUN BLD-MCNC: 10 MG/DL (ref 7–21)
BUN/CREAT SERPL: 10.8 (ref 7–25)
BUPRENORPHINE SERPL-MCNC: NEGATIVE NG/ML
CALCIUM SPEC-SCNC: 9.6 MG/DL (ref 7.7–10)
CANNABINOIDS SERPL QL: NEGATIVE
CHLORIDE SERPL-SCNC: 107 MMOL/L (ref 99–112)
CLARITY UR: CLEAR
CO2 SERPL-SCNC: 25.4 MMOL/L (ref 24.3–31.9)
COCAINE UR QL: NEGATIVE
COLOR UR: YELLOW
CREAT BLD-MCNC: 0.93 MG/DL (ref 0.43–1.29)
DEPRECATED RDW RBC AUTO: 41.7 FL (ref 37–54)
EOSINOPHIL # BLD AUTO: 0.04 10*3/MM3 (ref 0–0.7)
EOSINOPHIL NFR BLD AUTO: 1 % (ref 0–5)
ERYTHROCYTE [DISTWIDTH] IN BLOOD BY AUTOMATED COUNT: 13.6 % (ref 11.5–14.5)
GFR SERPL CREATININE-BSD FRML MDRD: 67 ML/MIN/1.73
GLOBULIN UR ELPH-MCNC: 3.4 GM/DL
GLUCOSE BLD-MCNC: 97 MG/DL (ref 70–110)
GLUCOSE UR STRIP-MCNC: NEGATIVE MG/DL
HCT VFR BLD AUTO: 39.4 % (ref 37–47)
HGB BLD-MCNC: 13.7 G/DL (ref 12–16)
HGB UR QL STRIP.AUTO: NEGATIVE
HYALINE CASTS UR QL AUTO: NORMAL /LPF
IMM GRANULOCYTES # BLD: 0 10*3/MM3 (ref 0–0.03)
IMM GRANULOCYTES NFR BLD: 0 % (ref 0–0.5)
KETONES UR QL STRIP: NEGATIVE
LEUKOCYTE ESTERASE UR QL STRIP.AUTO: ABNORMAL
LYMPHOCYTES # BLD AUTO: 1.08 10*3/MM3 (ref 1–3)
LYMPHOCYTES NFR BLD AUTO: 26.3 % (ref 21–51)
MCH RBC QN AUTO: 29.3 PG (ref 27–33)
MCHC RBC AUTO-ENTMCNC: 34.8 G/DL (ref 33–37)
MCV RBC AUTO: 84.2 FL (ref 80–94)
METHADONE UR QL SCN: NEGATIVE
MONOCYTES # BLD AUTO: 0.4 10*3/MM3 (ref 0.1–0.9)
MONOCYTES NFR BLD AUTO: 9.7 % (ref 0–10)
NEUTROPHILS # BLD AUTO: 2.58 10*3/MM3 (ref 1.4–6.5)
NEUTROPHILS NFR BLD AUTO: 62.8 % (ref 30–70)
NITRITE UR QL STRIP: NEGATIVE
OPIATES UR QL: NEGATIVE
OSMOLALITY SERPL CALC.SUM OF ELEC: 276.5 MOSM/KG (ref 273–305)
OVALOCYTES BLD QL SMEAR: NORMAL
OXYCODONE UR QL SCN: NEGATIVE
PCP UR QL SCN: NEGATIVE
PH UR STRIP.AUTO: 7 [PH] (ref 5–8)
PLATELET # BLD AUTO: 48 10*3/MM3 (ref 130–400)
PMV BLD AUTO: 11.2 FL (ref 6–10)
POTASSIUM BLD-SCNC: 4.1 MMOL/L (ref 3.5–5.3)
PROT SERPL-MCNC: 7.7 G/DL (ref 6–8)
PROT UR QL STRIP: NEGATIVE
RBC # BLD AUTO: 4.68 10*6/MM3 (ref 4.2–5.4)
RBC # UR: NORMAL /HPF
REF LAB TEST METHOD: NORMAL
SMALL PLATELETS BLD QL SMEAR: NORMAL
SODIUM BLD-SCNC: 139 MMOL/L (ref 135–153)
SP GR UR STRIP: 1.02 (ref 1–1.03)
SQUAMOUS #/AREA URNS HPF: NORMAL /HPF
UROBILINOGEN UR QL STRIP: ABNORMAL
WBC NRBC COR # BLD: 4.11 10*3/MM3 (ref 4.5–12.5)
WBC UR QL AUTO: NORMAL /HPF

## 2018-03-20 PROCEDURE — 80307 DRUG TEST PRSMV CHEM ANLYZR: CPT | Performed by: PHYSICIAN ASSISTANT

## 2018-03-20 PROCEDURE — 99283 EMERGENCY DEPT VISIT LOW MDM: CPT

## 2018-03-20 PROCEDURE — 74177 CT ABD & PELVIS W/CONTRAST: CPT | Performed by: RADIOLOGY

## 2018-03-20 PROCEDURE — 96360 HYDRATION IV INFUSION INIT: CPT

## 2018-03-20 PROCEDURE — 80053 COMPREHEN METABOLIC PANEL: CPT | Performed by: PHYSICIAN ASSISTANT

## 2018-03-20 PROCEDURE — 25010000002 IOPAMIDOL 61 % SOLUTION: Performed by: EMERGENCY MEDICINE

## 2018-03-20 PROCEDURE — 74177 CT ABD & PELVIS W/CONTRAST: CPT

## 2018-03-20 PROCEDURE — 81001 URINALYSIS AUTO W/SCOPE: CPT | Performed by: PHYSICIAN ASSISTANT

## 2018-03-20 PROCEDURE — 85025 COMPLETE CBC W/AUTO DIFF WBC: CPT | Performed by: PHYSICIAN ASSISTANT

## 2018-03-20 PROCEDURE — 81025 URINE PREGNANCY TEST: CPT | Performed by: PHYSICIAN ASSISTANT

## 2018-03-20 PROCEDURE — 85007 BL SMEAR W/DIFF WBC COUNT: CPT | Performed by: PHYSICIAN ASSISTANT

## 2018-03-20 RX ORDER — DICYCLOMINE HCL 20 MG
20 TABLET ORAL EVERY 8 HOURS PRN
Qty: 20 TABLET | Refills: 0 | Status: SHIPPED | OUTPATIENT
Start: 2018-03-20 | End: 2019-09-19

## 2018-03-20 RX ORDER — SODIUM CHLORIDE 0.9 % (FLUSH) 0.9 %
10 SYRINGE (ML) INJECTION AS NEEDED
Status: DISCONTINUED | OUTPATIENT
Start: 2018-03-20 | End: 2018-03-20 | Stop reason: HOSPADM

## 2018-03-20 RX ADMIN — SODIUM CHLORIDE 1000 ML: 9 INJECTION, SOLUTION INTRAVENOUS at 11:10

## 2018-03-20 RX ADMIN — IOPAMIDOL 100 ML: 612 INJECTION, SOLUTION INTRAVENOUS at 12:16

## 2018-03-20 NOTE — ED NOTES
Pt has had rlq for one week. She has a history of factor 5 deficiency. She had a cbc done at Dr. Gusman's office this morning and has the results with her. She has rebound tenderness on exam.     Kali Britt RN  03/20/18 4907

## 2018-03-20 NOTE — ED PROVIDER NOTES
"Subjective   This is a 42-year-old female comes in with chief complaint intermittent \"right-sided abdominal pain\". Patient states that she's had intermittent abdominal cramping that does not radiate.  Patient has had associated nausea without vomiting.  No reported fever, chills, body aches.  Patient does state the pain does not radiate.  Patient denies any other associated medical problems at this time.        History provided by:  Patient   used: No    Abdominal Pain   Pain location:  Generalized  Pain quality: aching, bloating and cramping    Pain radiates to:  RLQ  Pain severity:  Moderate  Onset quality:  Sudden  Duration:  2 weeks  Timing:  Intermittent  Progression:  Worsening  Chronicity:  New  Context: not alcohol use, not awakening from sleep, not diet changes, not medication withdrawal, not previous surgeries, not recent illness, not recent sexual activity, not retching, not sick contacts and not suspicious food intake    Relieved by:  Nothing  Worsened by:  Nothing  Ineffective treatments:  None tried  Associated symptoms: nausea and vomiting    Associated symptoms: no anorexia, no chills, no constipation, no dysuria, no fatigue, no fever, no flatus and no hematemesis    Risk factors: no alcohol abuse, no aspirin use, has not had multiple surgeries, no NSAID use and not pregnant        Review of Systems   Constitutional: Negative for chills, fatigue and fever.   Gastrointestinal: Positive for abdominal pain, nausea and vomiting. Negative for anorexia, constipation, flatus and hematemesis.   Genitourinary: Negative for dysuria.       Past Medical History:   Diagnosis Date   • Bulging lumbar disc    • Chronic ITP (idiopathic thrombocytopenia)    • Clotting disorder    • Hypertension    • Pulmonary emboli        No Known Allergies    Past Surgical History:   Procedure Laterality Date   • ABDOMINAL SURGERY     •  SECTION      x2   • D&C HYSTEROSCOPY ENDOMETRIAL ABLATION N/A " 2/2/2017    Procedure: DILATATION AND CURETTAGE, HYSTEROSCOPY, ABLATION(PT.GETTING PLATELETS DAY BEFORE SURGERY) OUT PT.SURGERY TO CHECK PLATELETS DAY OF SURGERY;  Surgeon: Gail White DO;  Location: [facility] OR;  Service:    • FRACTURE SURGERY Left     left foot ligament repair   • LEEP      x2       History reviewed. No pertinent family history.    Social History     Social History   • Marital status:      Social History Main Topics   • Smoking status: Never Smoker   • Smokeless tobacco: Never Used   • Alcohol use No   • Drug use: No   • Sexual activity: Defer     Other Topics Concern   • Not on file           Objective   Physical Exam   Constitutional: She is oriented to person, place, and time. She appears well-developed and well-nourished.   HENT:   Head: Normocephalic and atraumatic.   Right Ear: External ear normal.   Left Ear: External ear normal.   Nose: Nose normal.   Mouth/Throat: Oropharynx is clear and moist.   Eyes: Conjunctivae and EOM are normal. Pupils are equal, round, and reactive to light.   Neck: Normal range of motion. Neck supple.   Cardiovascular: Normal rate, regular rhythm and normal heart sounds.    Pulmonary/Chest: Effort normal and breath sounds normal. No respiratory distress. She has no wheezes.   Abdominal: Soft. Normal appearance and bowel sounds are normal. There is no hepatosplenomegaly, splenomegaly or hepatomegaly. There is tenderness in the right lower quadrant. No hernia.   Musculoskeletal: Normal range of motion.   Neurological: She is alert and oriented to person, place, and time.   Skin: Skin is warm and dry. Capillary refill takes less than 2 seconds. No rash noted. No erythema.   Psychiatric: She has a normal mood and affect. Her behavior is normal. Judgment and thought content normal.   Nursing note and vitals reviewed.      Procedures         ED Course  ED Course   Comment By Time   This is a 42-year-old female comes in with chief complaint right-sided  abdominal pain.  Patient did have CT scan to further evaluate for possible appendicitis versus bowel obstruction versus mass.  Patient scan was negative for any acute findings.  Patient was found have a platelet count 48 which is around her baseline. She does state that she follows oncology Dr. Conde for this.  States she was seen earlier this morning.  They do not treat her platelets unless below 20.  She is checked every few days by Dr. Conde's office.  Patient was instructed to follow-up with gastroenterology for colonoscopy.  Audi Choe PA-C 03/20 1256                  MDM  Number of Diagnoses or Management Options  Acute ITP: new and requires workup  Right sided abdominal pain: new and requires workup     Amount and/or Complexity of Data Reviewed  Decide to obtain previous medical records or to obtain history from someone other than the patient: yes    Risk of Complications, Morbidity, and/or Mortality  Presenting problems: moderate  Diagnostic procedures: moderate  Management options: moderate    Patient Progress  Patient progress: stable      Final diagnoses:   Acute ITP   Right sided abdominal pain            Audi Choe PA-C  03/20/18 1311

## 2018-04-10 ENCOUNTER — HOSPITAL ENCOUNTER (OUTPATIENT)
Dept: MAMMOGRAPHY | Facility: HOSPITAL | Age: 43
Discharge: HOME | End: 2018-04-10
Attending: INTERNAL MEDICINE | Admitting: INTERNAL MEDICINE

## 2018-04-10 DIAGNOSIS — Z12.31 VISIT FOR SCREENING MAMMOGRAM: ICD-10-CM

## 2018-04-10 PROCEDURE — 77067 SCR MAMMO BI INCL CAD: CPT | Performed by: RADIOLOGY

## 2018-04-10 PROCEDURE — 77063 BREAST TOMOSYNTHESIS BI: CPT | Performed by: RADIOLOGY

## 2018-04-10 PROCEDURE — 77067 SCR MAMMO BI INCL CAD: CPT

## 2018-04-10 PROCEDURE — 77063 BREAST TOMOSYNTHESIS BI: CPT

## 2018-06-04 ENCOUNTER — HOSPITAL ENCOUNTER (OUTPATIENT)
Dept: INFUSION THERAPY | Facility: HOSPITAL | Age: 43
Discharge: HOME | End: 2018-06-04
Admitting: NURSE PRACTITIONER

## 2018-06-04 ENCOUNTER — TRANSCRIBE ORDERS (OUTPATIENT)
Dept: INFUSION THERAPY | Facility: HOSPITAL | Age: 43
End: 2018-06-04

## 2018-06-04 VITALS
RESPIRATION RATE: 16 BRPM | HEART RATE: 68 BPM | SYSTOLIC BLOOD PRESSURE: 141 MMHG | DIASTOLIC BLOOD PRESSURE: 92 MMHG | TEMPERATURE: 97.5 F

## 2018-06-04 DIAGNOSIS — D69.6 THROMBOCYTOPENIA (HCC): ICD-10-CM

## 2018-06-04 DIAGNOSIS — D69.6 THROMBOCYTOPENIA (HCC): Primary | ICD-10-CM

## 2018-06-04 LAB
ABO GROUP BLD: NORMAL
PLATELET # BLD AUTO: 18 10*3/MM3 (ref 130–400)
PLATELET # BLD AUTO: 30 10*3/MM3 (ref 130–400)
RH BLD: NEGATIVE
SMALL PLATELETS BLD QL SMEAR: ABNORMAL

## 2018-06-04 PROCEDURE — P9035 PLATELET PHERES LEUKOREDUCED: HCPCS

## 2018-06-04 PROCEDURE — 85049 AUTOMATED PLATELET COUNT: CPT | Performed by: NURSE PRACTITIONER

## 2018-06-04 PROCEDURE — 25010000002 METHYLPREDNISOLONE PER 125 MG: Performed by: NURSE PRACTITIONER

## 2018-06-04 PROCEDURE — 86901 BLOOD TYPING SEROLOGIC RH(D): CPT | Performed by: NURSE PRACTITIONER

## 2018-06-04 PROCEDURE — 96374 THER/PROPH/DIAG INJ IV PUSH: CPT

## 2018-06-04 PROCEDURE — 36430 TRANSFUSION BLD/BLD COMPNT: CPT

## 2018-06-04 PROCEDURE — 86900 BLOOD TYPING SEROLOGIC ABO: CPT | Performed by: NURSE PRACTITIONER

## 2018-06-04 PROCEDURE — 36415 COLL VENOUS BLD VENIPUNCTURE: CPT

## 2018-06-04 RX ORDER — METHYLPREDNISOLONE SODIUM SUCCINATE 125 MG/2ML
125 INJECTION, POWDER, LYOPHILIZED, FOR SOLUTION INTRAMUSCULAR; INTRAVENOUS ONCE
Status: COMPLETED | OUTPATIENT
Start: 2018-06-04 | End: 2018-06-04

## 2018-06-04 RX ORDER — DIPHENHYDRAMINE HYDROCHLORIDE 25 MG/1
5 TABLET ORAL DAILY
COMMUNITY
End: 2022-03-02

## 2018-06-04 RX ADMIN — METHYLPREDNISOLONE SODIUM SUCCINATE 125 MG: 125 INJECTION, POWDER, FOR SOLUTION INTRAMUSCULAR; INTRAVENOUS at 14:52

## 2018-06-04 NOTE — PATIENT INSTRUCTIONS
Platelet Transfusion, Care After  Refer to this sheet in the next few weeks. These instructions provide you with information about caring for yourself after your procedure. Your health care provider may also give you more specific instructions. Your treatment has been planned according to current medical practices, but problems sometimes occur. Call your health care provider if you have any problems or questions after your procedure.  What can I expect after the procedure?  After the procedure, it is common to have:  · Bruising and soreness at the IV site.  · Fever or chills within the first 48 hours of your transfusion.  Follow these instructions at home:  · Take medicines only as directed by your health care provider. Ask your health care provider if you can take an over-the-counter pain reliever in case you have a fever or headache a day or two after your transfusion.  · Return to your normal activities as directed by your health care provider.  Contact a health care provider if:  · You have a fever.  · You have a headache.  · You have redness, swelling, or pain at your IV site.  · You have skin itching or a rash.  · You vomit.  · You feel unusually tired or weak.  Get help right away if:  · You have trouble breathing.  · You have a decreased amount of urine or you urinate less often than you normally do.  · Your urine is darker than normal.  · You have pain in your back, abdomen, or chest.  · You have cool, clammy skin.  · You have a rapid heartbeat.  This information is not intended to replace advice given to you by your health care provider. Make sure you discuss any questions you have with your health care provider.  Document Released: 01/08/2016 Document Revised: 05/25/2017 Document Reviewed: 10/28/2015  [employer] Interactive Patient Education © 2017 [employer]  Platelet Transfusion, Care After  Refer to this sheet in the next few weeks. These instructions provide you with information about caring for  yourself after your procedure. Your health care provider may also give you more specific instructions. Your treatment has been planned according to current medical practices, but problems sometimes occur. Call your health care provider if you have any problems or questions after your procedure.  What can I expect after the procedure?  After the procedure, it is common to have:  · Bruising and soreness at the IV site.  · Fever or chills within the first 48 hours of your transfusion.  Follow these instructions at home:  · Take medicines only as directed by your health care provider. Ask your health care provider if you can take an over-the-counter pain reliever in case you have a fever or headache a day or two after your transfusion.  · Return to your normal activities as directed by your health care provider.  Contact a health care provider if:  · You have a fever.  · You have a headache.  · You have redness, swelling, or pain at your IV site.  · You have skin itching or a rash.  · You vomit.  · You feel unusually tired or weak.  Get help right away if:  · You have trouble breathing.  · You have a decreased amount of urine or you urinate less often than you normally do.  · Your urine is darker than normal.  · You have pain in your back, abdomen, or chest.  · You have cool, clammy skin.  · You have a rapid heartbeat.  This information is not intended to replace advice given to you by your health care provider. Make sure you discuss any questions you have with your health care provider.  Document Released: 01/08/2016 Document Revised: 05/25/2017 Document Reviewed: 10/28/2015  [employer] Interactive Patient Education © 2017 [employer]

## 2018-06-05 LAB
ABO + RH BLD: NORMAL
BH BB BLOOD EXPIRATION DATE: NORMAL
BH BB BLOOD TYPE BARCODE: 6200
BH BB DISPENSE STATUS: NORMAL
BH BB PRODUCT CODE: NORMAL
BH BB UNIT NUMBER: NORMAL
UNIT  ABO: NORMAL
UNIT  RH: NORMAL

## 2019-04-11 ENCOUNTER — HOSPITAL ENCOUNTER (OUTPATIENT)
Dept: MAMMOGRAPHY | Facility: HOSPITAL | Age: 44
Discharge: HOME | End: 2019-04-11
Admitting: INTERNAL MEDICINE

## 2019-04-11 DIAGNOSIS — Z12.39 SCREENING BREAST EXAMINATION: ICD-10-CM

## 2019-04-11 PROCEDURE — 77067 SCR MAMMO BI INCL CAD: CPT | Performed by: RADIOLOGY

## 2019-04-11 PROCEDURE — 77063 BREAST TOMOSYNTHESIS BI: CPT

## 2019-04-11 PROCEDURE — 77063 BREAST TOMOSYNTHESIS BI: CPT | Performed by: RADIOLOGY

## 2019-04-11 PROCEDURE — 77067 SCR MAMMO BI INCL CAD: CPT

## 2019-08-13 ENCOUNTER — OFFICE VISIT (OUTPATIENT)
Dept: PSYCHIATRY | Facility: CLINIC | Age: 44
End: 2019-08-13

## 2019-08-13 VITALS
BODY MASS INDEX: 23.86 KG/M2 | WEIGHT: 152 LBS | HEIGHT: 67 IN | SYSTOLIC BLOOD PRESSURE: 124 MMHG | DIASTOLIC BLOOD PRESSURE: 81 MMHG | HEART RATE: 72 BPM

## 2019-08-13 DIAGNOSIS — Z79.899 MEDICATION MANAGEMENT: ICD-10-CM

## 2019-08-13 DIAGNOSIS — F32.81 PMDD (PREMENSTRUAL DYSPHORIC DISORDER): Primary | ICD-10-CM

## 2019-08-13 PROBLEM — M32.9 LUPUS: Status: ACTIVE | Noted: 2019-08-13

## 2019-08-13 PROBLEM — D68.51 FACTOR V LEIDEN (HCC): Status: ACTIVE | Noted: 2019-08-13

## 2019-08-13 PROBLEM — IMO0002 LUPUS: Status: ACTIVE | Noted: 2019-08-13

## 2019-08-13 LAB
AMPHETAMINE CUT-OFF: NORMAL
BENZODIAZIPINE CUT-OFF: NORMAL
BUPRENORPHINE CUT-OFF: NORMAL
COCAINE CUT-OFF: NORMAL
EXTERNAL AMPHETAMINE SCREEN URINE: NEGATIVE
EXTERNAL BENZODIAZEPINE SCREEN URINE: NEGATIVE
EXTERNAL BUPRENORPHINE SCREEN URINE: NEGATIVE
EXTERNAL COCAINE SCREEN URINE: NEGATIVE
EXTERNAL MDMA: NEGATIVE
EXTERNAL METHADONE SCREEN URINE: NEGATIVE
EXTERNAL METHAMPHETAMINE SCREEN URINE: NEGATIVE
EXTERNAL OPIATES SCREEN URINE: NEGATIVE
EXTERNAL OXYCODONE SCREEN URINE: NEGATIVE
EXTERNAL THC SCREEN URINE: NEGATIVE
MDMA CUT-OFF: NORMAL
METHADONE CUT-OFF: NORMAL
METHAMPHETAMINE CUT-OFF: NORMAL
OPIATES CUT-OFF: NORMAL
OXYCODONE CUT-OFF: NORMAL
THC CUT-OFF: NORMAL

## 2019-08-13 PROCEDURE — 99203 OFFICE O/P NEW LOW 30 MIN: CPT | Performed by: PSYCHIATRY & NEUROLOGY

## 2019-08-13 RX ORDER — SERTRALINE HYDROCHLORIDE 25 MG/1
TABLET, FILM COATED ORAL
COMMUNITY
Start: 2019-08-06 | End: 2019-08-13

## 2019-08-13 RX ORDER — BUSPIRONE HYDROCHLORIDE 15 MG/1
TABLET ORAL
Qty: 60 TABLET | Refills: 0 | Status: SHIPPED | OUTPATIENT
Start: 2019-08-13 | End: 2019-09-19

## 2019-08-13 RX ORDER — LORATADINE 10 MG/1
TABLET ORAL
COMMUNITY
Start: 2019-08-06

## 2019-08-13 RX ORDER — HYDROXYCHLOROQUINE SULFATE 200 MG/1
200 TABLET, FILM COATED ORAL 2 TIMES DAILY WITH MEALS
Refills: 2 | COMMUNITY
Start: 2019-07-25 | End: 2022-03-02

## 2019-08-13 RX ORDER — MYCOPHENOLATE MOFETIL 500 MG/1
1000 TABLET ORAL 2 TIMES DAILY
Refills: 2 | COMMUNITY
Start: 2019-07-25 | End: 2020-03-12 | Stop reason: SDUPTHER

## 2019-08-13 RX ORDER — FLUTICASONE PROPIONATE 50 MCG
SPRAY, SUSPENSION (ML) NASAL
COMMUNITY
Start: 2019-08-06 | End: 2022-03-02

## 2019-08-13 NOTE — PROGRESS NOTES
Subjective   Emily Zavala is a 44 y.o. female who presents today for initial evaluation     Chief Complaint: Anxiety    History of Present Illness: Patient is a 44-year-old  female with no significant psychiatric history presenting for anxiety.  Patient has a diagnosis of factor V Leiden deficiency and lupus.  Patient reports that she has been having episodes of nausea and anxiety.  She states that about 1-2 times a month she experiences extreme nausea with decreased appetite, feeling detached versus based out, racing heart, feeling sweaty, and anxious.  She has noticed that this is often around the time of her menstrual cycle.  She does not have a history of PMDD, however symptoms are consistent with diagnosis.  She was started on Zoloft 25 mg p.o. daily for anxiety by her primary care provider, however patient did not take this that she worried about taking excessive medications and was afraid of interactions.  Discussed with patient treatment options for PMDD.  Due to her factor V Leiden deficiency and medications for bleeding risk, it is inadvisable to use an SSRI as they can reduce platelet aggregation.  This may cause negative interactions with her Xarelto and increased bleeding risk.  She is not interested in taking a daily medication or more medications than she needs to take.  For this reason, luteal phase or symptomatic treatment regimens are recommended.  During these treatments, medication is only taken either at day 14 until menses, or at the start of symptoms for a week.  We will use buspirone as needed with symptomatic treatment.    She reports no major issues with mood but does feel that she is more irritable.  She feels that this could be related to having a teenager and an 11-year-old but also feels irritability is somewhat worse during her menstrual cycle.  She denies SI/HI/AVH.  Sleep.  She is having issues with appetite during these episodes of anxiety as she has increased nausea.  She  feels fatigued most of the time but this is likely related to her chronic medical conditions.    The following portions of the patient's history were reviewed and updated as appropriate: allergies, current medications, past family history, past medical history, past social history, past surgical history and problem list.      Past Medical History:  Past Medical History:   Diagnosis Date   • Bulging lumbar disc    • Chronic ITP (idiopathic thrombocytopenia) (CMS/HCC)    • Clotting disorder (CMS/HCC)    • Hypertension    • Pulmonary emboli (CMS/HCC)        Social History:  Social History     Socioeconomic History   • Marital status:      Spouse name: Not on file   • Number of children: Not on file   • Years of education: Not on file   • Highest education level: Not on file   Tobacco Use   • Smoking status: Never Smoker   • Smokeless tobacco: Never Used   Substance and Sexual Activity   • Alcohol use: No   • Drug use: No   • Sexual activity: Defer   She is  and not currently in a relationship.  She has 2 sons at home, 17 and 11 years old.  She completed the eighth grade but dropped out of high school after that.  She currently supports herself with disability due to physical ailments.  She denies any substance use including alcohol, tobacco, and illicit substances.    Family History:  Family History   Problem Relation Age of Onset   • Breast cancer Maternal Aunt    Patient has a sister with bipolar schizophrenia.    Past Surgical History:  Past Surgical History:   Procedure Laterality Date   • ABDOMINAL SURGERY     •  SECTION      x2   • D&C HYSTEROSCOPY ENDOMETRIAL ABLATION N/A 2017    Procedure: DILATATION AND CURETTAGE, HYSTEROSCOPY, ABLATION(PT.GETTING PLATELETS DAY BEFORE SURGERY) OUT PT.SURGERY TO CHECK PLATELETS DAY OF SURGERY;  Surgeon: Gail White DO;  Location: [facility];  Service:    • FRACTURE SURGERY Left     left foot ligament repair   • LEEP      x2       Problem  List:  Patient Active Problem List   Diagnosis   • Lupus   • Factor V Leiden (CMS/HCC)       Allergy:   No Known Allergies     Current Medications:   Current Outpatient Medications   Medication Sig Dispense Refill   • Biotin (BIOTIN 5000) 5 MG capsule Take 5 mg by mouth Daily.     • dicyclomine (BENTYL) 20 MG tablet Take 1 tablet by mouth Every 8 (Eight) Hours As Needed (abdominal pain). 20 tablet 0   • fluticasone (FLONASE) 50 MCG/ACT nasal spray      • hydroxychloroquine (PLAQUENIL) 200 MG tablet Take 200 mg by mouth 2 (Two) Times a Day With Meals.  2   • lisinopril (PRINIVIL,ZESTRIL) 10 MG tablet Take 10 mg by mouth Daily.     • loratadine (CLARITIN) 10 MG tablet      • mycophenolate (CELLCEPT) 500 MG tablet Take 1,000 mg by mouth 2 (Two) Times a Day.  2   • rivaroxaban (XARELTO) 20 MG tablet Take 20 mg by mouth Daily.     • B Complex Vitamins (B COMPLEX-B12 PO) Take  by mouth Daily.     • busPIRone (BUSPAR) 15 MG tablet Take 1/2, to 1 whole tablet up to three times a day as needed for anxiety. 60 tablet 0     No current facility-administered medications for this visit.        Review of Symptoms:    Review of Systems   Constitutional: Positive for activity change, appetite change and fatigue. Negative for chills and fever.   HENT: Negative.    Eyes: Negative.    Respiratory: Negative.    Cardiovascular: Negative.    Gastrointestinal: Positive for nausea. Negative for diarrhea and vomiting.   Endocrine: Negative.    Genitourinary: Negative.    Musculoskeletal: Negative.    Skin: Negative.    Allergic/Immunologic: Negative.    Neurological: Negative.    Hematological:        Factor V    Psychiatric/Behavioral: Positive for agitation, decreased concentration and dysphoric mood. Negative for behavioral problems, hallucinations, self-injury, sleep disturbance, suicidal ideas, negative for hyperactivity and stress. The patient is nervous/anxious.          Physical Exam:   Blood pressure 124/81, pulse 72, height 170.2  "cm (67\"), weight 68.9 kg (152 lb), not currently breastfeeding.    Appearance: Well-dressed  female of stated age in no acute distress  Gait, Station, Strength: Within normal limits    Mental Status Exam:   Hygiene:   good  Cooperation:  Cooperative  Eye Contact:  Good  Psychomotor Behavior:  Appropriate  Affect:  Full range  Mood: irritable  Hopelessness: Denies  Speech:  Normal  Thought Process:  Goal directed and Linear  Thought Content:  Normal  Suicidal:  None  Homicidal:  None  Hallucinations:  None  Delusion:  None  Memory:  Intact  Orientation:  Person, Place, Time and Situation  Reliability:  good  Insight:  Good  Judgement:  Good  Impulse Control:  Good  Physical/Medical Issues:  Yes Factor V Leiden and lupus       Lab Results:   Office Visit on 08/13/2019   Component Date Value Ref Range Status   • External Amphetamine Screen Urine 08/13/2019 Negative   Final   • Amphetamine Cut-Off 08/13/2019 1000mg/ml   Final   • External Benzodiazepine Screen Uri* 08/13/2019 Negative   Final   • Benzodiazipine Cut-Off 08/13/2019 300mg/ml   Final   • External Cocaine Screen Urine 08/13/2019 Negative   Final   • Cocaine Cut-Off 08/13/2019 300mg/ml   Final   • External THC Screen Urine 08/13/2019 Negative   Final   • THC Cut-Off 08/13/2019 50mg/ml   Final   • External Methadone Screen Urine 08/13/2019 Negative   Final   • Methadone Cut-Off 08/13/2019 300mg/ml   Final   • External Methamphetamine Screen Ur* 08/13/2019 Negative   Final   • Methamphetamine Cut-Off 08/13/2019 1000mg/ml   Final   • External Oxycodone Screen Urine 08/13/2019 Negative   Final   • Oxycodone Cut-Off 08/13/2019 100mg/ml   Final   • External Buprenorphine Screen Urine 08/13/2019 Negative   Final   • Buprenorphine Cut-Off 08/13/2019 10mg/ml   Final   • External MDMA 08/13/2019 Negative   Final   • MDMA Cut-Off 08/13/2019 500mg/ml   Final   • External Opiates Screen Urine 08/13/2019 Negative   Final   • Opiates Cut-Off 08/13/2019 300mg/ml   " Final       Assessment/Plan   Diagnoses and all orders for this visit:    PMDD (premenstrual dysphoric disorder)  -     busPIRone (BUSPAR) 15 MG tablet; Take 1/2, to 1 whole tablet up to three times a day as needed for anxiety.    Medication management  -     [facility] Drug Screen    -Patient is exhibiting symptoms of premenstrual dysphoric disorder.  She does not want to be on more medications if necessary so we will do symptomatic dosing in which patient will take medication once a month around the time of her menses and symptom onset.  Due to her intermittent use of blood thinning medications and history of factor V Leiden, antidepressants should be avoided if possible.  There is a minimal but potentially additive effect of slowing platelet aggregation from SSRI medications.  She has constant platelet monitoring and the thinning effect could actually be beneficial to her factor V Leiden if SSRIs end up having to be used to control her symptoms.  However, we will try buspirone in order to avoid potential complications.  -Reviewed labs  -REGINE reviewed and appropriate.        Visit Diagnoses:    ICD-10-CM ICD-9-CM   1. Medication management Z79.899 V58.69       TREATMENT PLAN/GOALS: Continue supportive psychotherapy efforts and medications as indicated. Treatment and medication options discussed during today's visit. Patient ackowledged and verbally consented to continue with current treatment plan and was educated on the importance of compliance with treatment and follow-up appointments.    MEDICATION ISSUES:    Discussed medication options and treatment plan of prescribed medication as well as the risks, benefits, and side effects including potential falls, possible impaired driving and metabolic adversities among others. Patient is agreeable to call the office with any worsening of symptoms or onset of side effects. Patient is agreeable to call 911 or go to the nearest ER should he/she begin having SI/HI. No  medication side effects or related complaints today.     MEDS ORDERED DURING VISIT:  New Medications Ordered This Visit   Medications   • busPIRone (BUSPAR) 15 MG tablet     Sig: Take 1/2, to 1 whole tablet up to three times a day as needed for anxiety.     Dispense:  60 tablet     Refill:  0       Return in about 4 weeks (around 9/10/2019).             This document has been electronically signed by Nithin Tyler MD  August 13, 2019 11:11 AM

## 2019-09-17 ENCOUNTER — PREP FOR SURGERY (OUTPATIENT)
Dept: OTHER | Facility: HOSPITAL | Age: 44
End: 2019-09-17

## 2019-09-17 DIAGNOSIS — N89.8 VAGINAL LESION: Primary | ICD-10-CM

## 2019-09-17 RX ORDER — SODIUM CHLORIDE 0.9 % (FLUSH) 0.9 %
10 SYRINGE (ML) INJECTION AS NEEDED
Status: CANCELLED | OUTPATIENT
Start: 2019-09-17

## 2019-09-17 RX ORDER — SODIUM CHLORIDE 0.9 % (FLUSH) 0.9 %
3 SYRINGE (ML) INJECTION EVERY 12 HOURS SCHEDULED
Status: CANCELLED | OUTPATIENT
Start: 2019-09-17

## 2019-09-19 ENCOUNTER — APPOINTMENT (OUTPATIENT)
Dept: PREADMISSION TESTING | Facility: HOSPITAL | Age: 44
End: 2019-09-19

## 2019-09-19 DIAGNOSIS — N89.8 VAGINAL LESION: ICD-10-CM

## 2019-09-19 LAB
ABO GROUP BLD: NORMAL
ANION GAP SERPL CALCULATED.3IONS-SCNC: 12 MMOL/L (ref 5–15)
BASOPHILS # BLD AUTO: 0.01 10*3/MM3 (ref 0–0.2)
BASOPHILS NFR BLD AUTO: 0.2 % (ref 0–1.5)
BLD GP AB SCN SERPL QL: NEGATIVE
BUN BLD-MCNC: 12 MG/DL (ref 6–20)
BUN/CREAT SERPL: 12.6 (ref 7–25)
CALCIUM SPEC-SCNC: 9.5 MG/DL (ref 8.6–10.5)
CHLORIDE SERPL-SCNC: 104 MMOL/L (ref 98–107)
CO2 SERPL-SCNC: 26 MMOL/L (ref 22–29)
CREAT BLD-MCNC: 0.95 MG/DL (ref 0.57–1)
DEPRECATED RDW RBC AUTO: 39.6 FL (ref 37–54)
EOSINOPHIL # BLD AUTO: 0.01 10*3/MM3 (ref 0–0.4)
EOSINOPHIL NFR BLD AUTO: 0.2 % (ref 0.3–6.2)
ERYTHROCYTE [DISTWIDTH] IN BLOOD BY AUTOMATED COUNT: 13.2 % (ref 12.3–15.4)
GFR SERPL CREATININE-BSD FRML MDRD: 65 ML/MIN/1.73
GLUCOSE BLD-MCNC: 90 MG/DL (ref 65–99)
HCT VFR BLD AUTO: 37.8 % (ref 34–46.6)
HGB BLD-MCNC: 12.6 G/DL (ref 12–15.9)
IMM GRANULOCYTES # BLD AUTO: 0.01 10*3/MM3 (ref 0–0.05)
IMM GRANULOCYTES NFR BLD AUTO: 0.2 % (ref 0–0.5)
LYMPHOCYTES # BLD AUTO: 1 10*3/MM3 (ref 0.7–3.1)
LYMPHOCYTES NFR BLD AUTO: 24.6 % (ref 19.6–45.3)
MCH RBC QN AUTO: 27.6 PG (ref 26.6–33)
MCHC RBC AUTO-ENTMCNC: 33.3 G/DL (ref 31.5–35.7)
MCV RBC AUTO: 82.9 FL (ref 79–97)
MONOCYTES # BLD AUTO: 0.46 10*3/MM3 (ref 0.1–0.9)
MONOCYTES NFR BLD AUTO: 11.3 % (ref 5–12)
NEUTROPHILS # BLD AUTO: 2.58 10*3/MM3 (ref 1.7–7)
NEUTROPHILS NFR BLD AUTO: 63.5 % (ref 42.7–76)
PLATELET # BLD AUTO: 190 10*3/MM3 (ref 140–450)
PMV BLD AUTO: 10.3 FL (ref 6–12)
POTASSIUM BLD-SCNC: 3.8 MMOL/L (ref 3.5–5.2)
RBC # BLD AUTO: 4.56 10*6/MM3 (ref 3.77–5.28)
RH BLD: NEGATIVE
SODIUM BLD-SCNC: 142 MMOL/L (ref 136–145)
T&S EXPIRATION DATE: NORMAL
WBC NRBC COR # BLD: 4.07 10*3/MM3 (ref 3.4–10.8)

## 2019-09-19 PROCEDURE — 86850 RBC ANTIBODY SCREEN: CPT | Performed by: NURSE PRACTITIONER

## 2019-09-19 PROCEDURE — 80048 BASIC METABOLIC PNL TOTAL CA: CPT | Performed by: NURSE PRACTITIONER

## 2019-09-19 PROCEDURE — 86900 BLOOD TYPING SEROLOGIC ABO: CPT | Performed by: NURSE PRACTITIONER

## 2019-09-19 PROCEDURE — 85025 COMPLETE CBC W/AUTO DIFF WBC: CPT | Performed by: NURSE PRACTITIONER

## 2019-09-19 PROCEDURE — 86901 BLOOD TYPING SEROLOGIC RH(D): CPT | Performed by: NURSE PRACTITIONER

## 2019-09-19 PROCEDURE — 36415 COLL VENOUS BLD VENIPUNCTURE: CPT

## 2019-09-19 NOTE — DISCHARGE INSTRUCTIONS
930----9/24/2019        Arrival time    TAKE the following medications the morning of surgery:  All heart or blood pressure medications    HOLD all diabetic medications the morning of surgery as ordered by physician.    Please discontinue all blood thinners and anticoagulants (except aspirin) prior to surgery as per your surgeon and cardiologist instructions.  Aspirin may be continued up to the day prior to surgery.     CHLORHEXIDINE CLOTHS GIVEN WITH INSTRUCTIONS AND FORM TO RETURN TO HOSPITAL    General Instructions:  · Do not eat or drink after midnight: includes water, mints, or gum. You may brush your teeth.  Dental appliances that are removable must be taken out day of surgery.  · Do not smoke, chew tobacco, or drink alcohol.  · Bring medications in original bottles, any inhalers and if applicable your C-PAP/BI-PAP machine.  · Bring any papers given to you in the doctor's office.  · Wear clean comfortable clothes and socks.  · Do not wear contact lenses or make-up. Bring a case for your glasses if applicable.  · Bring crutches or walker if applicable.  · Leave all other valuables and jewelry at home.    If you were given a blood bank ID arm band remember to bring it with you the day of surgery.    Preventing a Surgical Site Infection:  Shower the night before surgery (unless instructed other wise) using a fresh bar of anti-bacterial soap (such as Dial) and clean washcloth. Dry with a clean towel and dress in clean clothing.  For 2 to 3 days before surgery, avoid shaving with a razor near where you will have surgery because the razor can irritate skin and make it easier to develop an infection. Ask your surgeon if you will be receiving antibiotics prior to surgery.  Make sure you, your family, and all healthcare providers clear their hands with soap and water or an alcohol-based hand  before caring for you or your wound.  If at all possible, quit smoking as many days before surgery as you can.    Day  of surgery:  Upon arrival, a Pre-op nurse and Anesthesiologist will review your health history, obtain vital signs, and answer questions you may have. The only belongings needed at this time will be your home medications and if applicable your C-PAP/BI-PAP machine. If you are staying overnight your family can leave the rest of your belongings in the car and bring them to your room later. A Pre-op nurse will start an IV and you may receive medication in preparation for surgery, including something to help you relax. Your family will be able to see you in the Pre-op area. While you are in surgery your family should notify the waiting room  if they leave the waiting room area and provide a contact phone number.    Please be aware that surgery does come with discomfort. We want to make every effort to control your discomfort so please discuss any uncontrolled symptoms with your nurse. Your doctor will most likely have prescribed pain medications.    If you are going home after surgery you will receive individualized written care instructions before being discharged. A responsible adult must drive you to and from the hospital on the day of surgery and stay with you for 24 hours.    If you are staying overnight following surgery, you will be transported to your hospital room following the recovery period.   has all private rooms.    If you have any questions please call Pre-Admission Testing at [phone].  Deductibles and co-payments are collected on the day of service. Please be prepared to pay the required co-pay, deductible or deposit on the day of service as defined by your plan.

## 2019-09-19 NOTE — H&P
HISTORY    Chief complaint  Vulvar lesion, VAIN1    History of present illness  This is a 44 year old  patient who has had tubal sterilization.  She was in our office and had a vulvar biopsy done May 14, 2019 by Hai.  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  She is admitted at this time for reexcision to make sure that we have removed all of this.    Past medical, surgical, obstetrical history  Chart says that she has hypertension, anemia, tubal sterilization and has had a LEEP procedure.  Her last Pap smear was normal.    She has a history of lupus and factor V Leyden deficiency.  She has Dr. Rivas from [place] her is managing that for her and she is going to possibly use Lovenox or heparin preoperatively.  As I said, he will manage that.    Family history  NAD    Social history  NAD    Functional inquiry  NAD                             PHYSICAL EXAMINATION    General  In no acute distress    HEENT  Throat and ears are clear, no masses or nodes were palpable    CVR  Heart sounds are normal with no murmurs, chest is clear to IPPA    GI/  Abdomen is soft with no masses tenderness or organomegaly.  Pelvic examination reveals a small area at the inferior aspect of the right labia majora that probably needs to be reexcised.    MS  No gross bone or joint abnormalities.                                        IMPRESSION    Right vulvar lesion for reexcision    TREATMENT PLAN    Reexcise right vulvar lesion.

## 2019-09-24 ENCOUNTER — ANESTHESIA (OUTPATIENT)
Dept: PERIOP | Facility: HOSPITAL | Age: 44
End: 2019-09-24

## 2019-09-24 ENCOUNTER — ANESTHESIA EVENT (OUTPATIENT)
Dept: PERIOP | Facility: HOSPITAL | Age: 44
End: 2019-09-24

## 2019-09-24 ENCOUNTER — HOSPITAL ENCOUNTER (OUTPATIENT)
Facility: HOSPITAL | Age: 44
Setting detail: HOSPITAL OUTPATIENT SURGERY
Discharge: HOME | End: 2019-09-24
Attending: OBSTETRICS & GYNECOLOGY | Admitting: OBSTETRICS & GYNECOLOGY

## 2019-09-24 VITALS
SYSTOLIC BLOOD PRESSURE: 121 MMHG | DIASTOLIC BLOOD PRESSURE: 74 MMHG | HEIGHT: 67 IN | BODY MASS INDEX: 23.4 KG/M2 | TEMPERATURE: 97.7 F | RESPIRATION RATE: 18 BRPM | OXYGEN SATURATION: 100 % | WEIGHT: 149.13 LBS | HEART RATE: 71 BPM

## 2019-09-24 DIAGNOSIS — N90.89 VULVAL LESION: ICD-10-CM

## 2019-09-24 DIAGNOSIS — N89.8 VAGINAL LESION: ICD-10-CM

## 2019-09-24 LAB
B-HCG UR QL: NEGATIVE
INTERNAL NEGATIVE CONTROL: NEGATIVE
INTERNAL POSITIVE CONTROL: POSITIVE
Lab: NORMAL

## 2019-09-24 PROCEDURE — 25010000002 ONDANSETRON PER 1 MG: Performed by: NURSE ANESTHETIST, CERTIFIED REGISTERED

## 2019-09-24 PROCEDURE — 25010000002 MIDAZOLAM PER 1 MG: Performed by: NURSE ANESTHETIST, CERTIFIED REGISTERED

## 2019-09-24 PROCEDURE — 25010000002 CEFOXITIN: Performed by: NURSE PRACTITIONER

## 2019-09-24 PROCEDURE — 25010000002 PROPOFOL 10 MG/ML EMULSION: Performed by: NURSE ANESTHETIST, CERTIFIED REGISTERED

## 2019-09-24 PROCEDURE — 81025 URINE PREGNANCY TEST: CPT | Performed by: ANESTHESIOLOGY

## 2019-09-24 PROCEDURE — 25010000002 FENTANYL CITRATE (PF) 100 MCG/2ML SOLUTION: Performed by: NURSE ANESTHETIST, CERTIFIED REGISTERED

## 2019-09-24 RX ORDER — LIDOCAINE HYDROCHLORIDE 20 MG/ML
INJECTION, SOLUTION INFILTRATION; PERINEURAL AS NEEDED
Status: DISCONTINUED | OUTPATIENT
Start: 2019-09-24 | End: 2019-09-24 | Stop reason: SURG

## 2019-09-24 RX ORDER — FENTANYL CITRATE 50 UG/ML
INJECTION, SOLUTION INTRAMUSCULAR; INTRAVENOUS AS NEEDED
Status: DISCONTINUED | OUTPATIENT
Start: 2019-09-24 | End: 2019-09-24 | Stop reason: SURG

## 2019-09-24 RX ORDER — SODIUM CHLORIDE 0.9 % (FLUSH) 0.9 %
10 SYRINGE (ML) INJECTION AS NEEDED
Status: DISCONTINUED | OUTPATIENT
Start: 2019-09-24 | End: 2019-09-24 | Stop reason: HOSPADM

## 2019-09-24 RX ORDER — MEPERIDINE HYDROCHLORIDE 25 MG/ML
12.5 INJECTION INTRAMUSCULAR; INTRAVENOUS; SUBCUTANEOUS
Status: DISCONTINUED | OUTPATIENT
Start: 2019-09-24 | End: 2019-09-24 | Stop reason: HOSPADM

## 2019-09-24 RX ORDER — SODIUM CHLORIDE 0.9 % (FLUSH) 0.9 %
3 SYRINGE (ML) INJECTION EVERY 12 HOURS SCHEDULED
Status: DISCONTINUED | OUTPATIENT
Start: 2019-09-24 | End: 2019-09-24 | Stop reason: HOSPADM

## 2019-09-24 RX ORDER — ONDANSETRON 2 MG/ML
INJECTION INTRAMUSCULAR; INTRAVENOUS AS NEEDED
Status: DISCONTINUED | OUTPATIENT
Start: 2019-09-24 | End: 2019-09-24 | Stop reason: SURG

## 2019-09-24 RX ORDER — MIDAZOLAM HYDROCHLORIDE 1 MG/ML
INJECTION INTRAMUSCULAR; INTRAVENOUS AS NEEDED
Status: DISCONTINUED | OUTPATIENT
Start: 2019-09-24 | End: 2019-09-24 | Stop reason: SURG

## 2019-09-24 RX ORDER — IPRATROPIUM BROMIDE AND ALBUTEROL SULFATE 2.5; .5 MG/3ML; MG/3ML
3 SOLUTION RESPIRATORY (INHALATION) ONCE AS NEEDED
Status: DISCONTINUED | OUTPATIENT
Start: 2019-09-24 | End: 2019-09-24 | Stop reason: HOSPADM

## 2019-09-24 RX ORDER — BUPIVACAINE HYDROCHLORIDE AND EPINEPHRINE 5; 5 MG/ML; UG/ML
INJECTION, SOLUTION PERINEURAL AS NEEDED
Status: DISCONTINUED | OUTPATIENT
Start: 2019-09-24 | End: 2019-09-24 | Stop reason: HOSPADM

## 2019-09-24 RX ORDER — BACITRACIN, NEOMYCIN, POLYMYXIN B 400; 3.5; 5 [USP'U]/G; MG/G; [USP'U]/G
OINTMENT TOPICAL AS NEEDED
Status: DISCONTINUED | OUTPATIENT
Start: 2019-09-24 | End: 2019-09-24 | Stop reason: HOSPADM

## 2019-09-24 RX ORDER — FAMOTIDINE 10 MG/ML
INJECTION, SOLUTION INTRAVENOUS AS NEEDED
Status: DISCONTINUED | OUTPATIENT
Start: 2019-09-24 | End: 2019-09-24 | Stop reason: SURG

## 2019-09-24 RX ORDER — OXYCODONE HYDROCHLORIDE AND ACETAMINOPHEN 5; 325 MG/1; MG/1
1 TABLET ORAL ONCE AS NEEDED
Status: DISCONTINUED | OUTPATIENT
Start: 2019-09-24 | End: 2019-09-24 | Stop reason: HOSPADM

## 2019-09-24 RX ORDER — ONDANSETRON 2 MG/ML
4 INJECTION INTRAMUSCULAR; INTRAVENOUS AS NEEDED
Status: DISCONTINUED | OUTPATIENT
Start: 2019-09-24 | End: 2019-09-24 | Stop reason: HOSPADM

## 2019-09-24 RX ORDER — PRENATAL VIT NO.126/IRON/FOLIC 28MG-0.8MG
TABLET ORAL DAILY
COMMUNITY
End: 2021-05-17

## 2019-09-24 RX ORDER — OMEGA-3S/DHA/EPA/FISH OIL/D3 300MG-1000
400 CAPSULE ORAL DAILY
COMMUNITY

## 2019-09-24 RX ORDER — MAGNESIUM HYDROXIDE 1200 MG/15ML
LIQUID ORAL AS NEEDED
Status: DISCONTINUED | OUTPATIENT
Start: 2019-09-24 | End: 2019-09-24 | Stop reason: HOSPADM

## 2019-09-24 RX ORDER — SODIUM CHLORIDE, SODIUM LACTATE, POTASSIUM CHLORIDE, CALCIUM CHLORIDE 600; 310; 30; 20 MG/100ML; MG/100ML; MG/100ML; MG/100ML
125 INJECTION, SOLUTION INTRAVENOUS CONTINUOUS
Status: DISCONTINUED | OUTPATIENT
Start: 2019-09-24 | End: 2019-09-24 | Stop reason: HOSPADM

## 2019-09-24 RX ORDER — OXYCODONE HYDROCHLORIDE AND ACETAMINOPHEN 5; 325 MG/1; MG/1
1 TABLET ORAL EVERY 4 HOURS PRN
Qty: 30 TABLET | Refills: 0 | Status: SHIPPED | OUTPATIENT
Start: 2019-09-24 | End: 2019-11-13

## 2019-09-24 RX ORDER — CHLORAL HYDRATE 500 MG
1000 CAPSULE ORAL
COMMUNITY
End: 2022-03-02

## 2019-09-24 RX ORDER — SODIUM CHLORIDE 0.9 % (FLUSH) 0.9 %
3-10 SYRINGE (ML) INJECTION AS NEEDED
Status: DISCONTINUED | OUTPATIENT
Start: 2019-09-24 | End: 2019-09-24 | Stop reason: HOSPADM

## 2019-09-24 RX ORDER — FENTANYL CITRATE 50 UG/ML
50 INJECTION, SOLUTION INTRAMUSCULAR; INTRAVENOUS
Status: DISCONTINUED | OUTPATIENT
Start: 2019-09-24 | End: 2019-09-24 | Stop reason: HOSPADM

## 2019-09-24 RX ORDER — PROPOFOL 10 MG/ML
VIAL (ML) INTRAVENOUS AS NEEDED
Status: DISCONTINUED | OUTPATIENT
Start: 2019-09-24 | End: 2019-09-24 | Stop reason: SURG

## 2019-09-24 RX ADMIN — MIDAZOLAM HYDROCHLORIDE 2 MG: 1 INJECTION, SOLUTION INTRAMUSCULAR; INTRAVENOUS at 11:48

## 2019-09-24 RX ADMIN — FENTANYL CITRATE 50 MCG: 50 INJECTION INTRAMUSCULAR; INTRAVENOUS at 12:03

## 2019-09-24 RX ADMIN — LIDOCAINE HYDROCHLORIDE 60 MG: 20 INJECTION, SOLUTION INFILTRATION; PERINEURAL at 11:48

## 2019-09-24 RX ADMIN — CEFOXITIN 2 G: 2 INJECTION, POWDER, FOR SOLUTION INTRAVENOUS at 11:54

## 2019-09-24 RX ADMIN — ONDANSETRON 4 MG: 2 INJECTION INTRAMUSCULAR; INTRAVENOUS at 11:48

## 2019-09-24 RX ADMIN — SODIUM CHLORIDE, POTASSIUM CHLORIDE, SODIUM LACTATE AND CALCIUM CHLORIDE 125 ML/HR: 600; 310; 30; 20 INJECTION, SOLUTION INTRAVENOUS at 10:06

## 2019-09-24 RX ADMIN — FENTANYL CITRATE 50 MCG: 50 INJECTION INTRAMUSCULAR; INTRAVENOUS at 11:48

## 2019-09-24 RX ADMIN — FAMOTIDINE 20 MG: 10 INJECTION INTRAVENOUS at 11:48

## 2019-09-24 RX ADMIN — PROPOFOL 140 MG: 10 INJECTION, EMULSION INTRAVENOUS at 11:53

## 2019-09-24 NOTE — OP NOTE
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 Jared Morgan    Assistant: None    Estimated blood loss: 10 cc    Blood products: None    Grafts/implants: None    Complications: None    Description of the procedure: This patient was placed on the operating table in the supine position given a general anesthetic and then placed in the lithotomy position and prepped and draped in the usual fashion for vaginal surgery.  Marcaine with epinephrine was used to infiltrate the surgical area and I took a wide excision from the inferior right labia where she previously had a biopsy.  My piece that I removed was about 2.0 cm long.  Cautery was used to control minor bleeding, and the skin was closed with mattress sutures of 3-0 nylon.  Patient tolerated her procedure well and she was returned to the postanesthetic recovery room in satisfactory condition.    Significant points about this procedure:[default value]    Emily Zavala, July 25, 1975

## 2019-09-24 NOTE — ANESTHESIA PROCEDURE NOTES
Airway  Urgency: elective    Date/Time: 9/24/2019 11:53 AM  Airway not difficult    General Information and Staff    Patient location during procedure: OR  CRNA: Angi Mccarthy CRNA    Indications and Patient Condition  Indications for airway management: airway protection    Preoxygenated: yes  MILS maintained throughout  Mask difficulty assessment: 0 - not attempted    Final Airway Details  Final airway type: supraglottic airway      Successful airway: unique  Size 4    Number of attempts at approach: 1  Assessment: lips, teeth, and gum same as pre-op

## 2019-09-24 NOTE — H&P
HISTORY    Chief complaint  Vulvar lesion, VAIN1    History of present illness  This is a 44 year old  patient who has had tubal sterilization.  She was in our office and had a vulvar biopsy done May 14, 2019 by Hai.  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  She is admitted at this time for reexcision to make sure that we have removed all of this.    Past medical, surgical, obstetrical history  Chart says that she has hypertension, anemia, tubal sterilization and has had a LEEP procedure.  Her last Pap smear was normal.    She has a history of lupus and factor V Leyden deficiency.  She has Dr. Rivas from [place] her is managing that for her and she is going to possibly use Lovenox or heparin preoperatively.  As I said, he will manage that.    Family history  NAD    Social history  NAD    Functional inquiry  NAD                             PHYSICAL EXAMINATION    General  In no acute distress    HEENT  Throat and ears are clear, no masses or nodes were palpable    CVR  Heart sounds are normal with no murmurs, chest is clear to IPPA    GI/  Abdomen is soft with no masses tenderness or organomegaly.  Pelvic examination reveals a small area at the inferior aspect of the right labia majora that probably needs to be reexcised.    MS  No gross bone or joint abnormalities.                                        IMPRESSION    Right vulvar lesion for reexcision    TREATMENT PLAN    Reexcise right vulvar lesion.

## 2019-09-24 NOTE — ANESTHESIA PREPROCEDURE EVALUATION
Anesthesia Evaluation     Patient summary reviewed and Nursing notes reviewed   no history of anesthetic complications:  NPO Solid Status: > 8 hours  NPO Liquid Status: > 8 hours           Airway   Mallampati: II  TM distance: >3 FB  Neck ROM: full  no difficulty expected  Dental - normal exam     Pulmonary - normal exam    breath sounds clear to auscultation  (+) pulmonary embolism (15 yrs ago...pt on xarelto...last taken 2/1/2017),   (-) asthma, not a smoker  Cardiovascular - normal exam  Exercise tolerance: good (4-7 METS)    NYHA Classification: II  Rhythm: regular  Rate: normal    (+) hypertension, dysrhythmias,   (-) past MI, angina, CHF      Neuro/Psych- negative ROS  (-) seizures, CVA  GI/Hepatic/Renal/Endo - negative ROS   (-) GERD, liver disease, no renal disease, diabetes, hypothyroidism    Musculoskeletal     (+) back pain (left LE radiculopathy), radiculopathy Left lower extremity  Abdominal  - normal exam    Abdomen: soft.  Bowel sounds: normal.   Substance History - negative use     OB/GYN negative ob/gyn ROS         Other   (+) blood dyscrasia (H/o ITP...had platelet transfusion yesterday..PLTS 134..pt with h/o PE...took xarelto 2/1/2017)                     Anesthesia Plan    ASA 3     general   (ASA III due to ITP and h/o PE)  intravenous induction   Anesthetic plan, all risks, benefits, and alternatives have been provided, discussed and informed consent has been obtained with: patient.  Use of blood products discussed with patient  Consented to blood products.

## 2019-09-24 NOTE — ANESTHESIA POSTPROCEDURE EVALUATION
Patient: Emily Zavala    Procedure Summary     Date:  09/24/19 Room / Location:  [facility] OR 04 /  [facility] OR    Anesthesia Start:  1148 Anesthesia Stop:  1216    Procedure:  EXCISION OF VAGINAL LESION (N/A Vagina) Diagnosis:  (N89.8)    Surgeon:  Jared Morgan MD Provider:  Jaime Garcia MD    Anesthesia Type:  general ASA Status:  3          Anesthesia Type: general  Last vitals  BP   121/74 (09/24/19 1314)   Temp   97.7 °F (36.5 °C) (09/24/19 1314)   Pulse   71 (09/24/19 1314)   Resp   18 (09/24/19 1314)     SpO2   100 % (09/24/19 1314)     Post Anesthesia Care and Evaluation    Patient location during evaluation: bedside  Patient participation: complete - patient participated  Level of consciousness: awake and alert  Pain score: 1  Pain management: adequate  Airway patency: patent  Anesthetic complications: No anesthetic complications  PONV Status: none  Cardiovascular status: acceptable  Respiratory status: acceptable  Hydration status: acceptable       no

## 2019-09-26 LAB
LAB AP CASE REPORT: NORMAL
LAB AP CLINICAL INFORMATION: NORMAL
PATH REPORT.FINAL DX SPEC: NORMAL

## 2019-11-13 ENCOUNTER — OFFICE VISIT (OUTPATIENT)
Dept: PSYCHIATRY | Facility: CLINIC | Age: 44
End: 2019-11-13

## 2019-11-13 VITALS
HEART RATE: 73 BPM | DIASTOLIC BLOOD PRESSURE: 80 MMHG | BODY MASS INDEX: 24.23 KG/M2 | HEIGHT: 67 IN | WEIGHT: 154.4 LBS | SYSTOLIC BLOOD PRESSURE: 130 MMHG

## 2019-11-13 DIAGNOSIS — F32.81 PMDD (PREMENSTRUAL DYSPHORIC DISORDER): Primary | ICD-10-CM

## 2019-11-13 PROCEDURE — 99213 OFFICE O/P EST LOW 20 MIN: CPT | Performed by: PSYCHIATRY & NEUROLOGY

## 2019-11-13 RX ORDER — SERTRALINE HYDROCHLORIDE 25 MG/1
TABLET, FILM COATED ORAL
COMMUNITY
Start: 2019-11-04 | End: 2019-11-13 | Stop reason: SDUPTHER

## 2019-11-13 RX ORDER — BUSPIRONE HYDROCHLORIDE 15 MG/1
15 TABLET ORAL 3 TIMES DAILY
Qty: 90 TABLET | Refills: 2 | Status: SHIPPED | OUTPATIENT
Start: 2019-11-13 | End: 2020-03-12 | Stop reason: SDUPTHER

## 2019-11-15 NOTE — PROGRESS NOTES
Subjective   Emily Zavala is a 44 y.o. female who presents today for follow up    Chief Complaint: Anxiety    History of Present Illness: Patient is a 44-year-old  female with no significant psychiatric history presenting for anxiety.  Patient has a diagnosis of factor V Leiden deficiency and lupus.  At last visit, initial visit with the patient, we initiated BuSpar and Zoloft for patient's symptoms of anxiety, dysphoric mood, and likely premenstrual dysphoric disorder.  Since that time, she reports that her symptoms have become less severe and are happening less often.  She states that she has been having episodes in which she will chew her tongue and staff in his face and her son was concerned for possible absent seizure which she spoke to her family doctor about.  She has a neurology follow-up for this in [place] to investigate it further.  She has not had any side effects from Zoloft or BuSpar and reports that she is tolerated them well.  I advised that since she is tolerating these, we will continue Zoloft as is.  We will increase BuSpar to 15 mg 3 times daily as it can help with her continued anxiety and stress, especially during the time where she may be evaluated for seizure disorder.  Denies SI/HI/AVH.    The following portions of the patient's history were reviewed and updated as appropriate: allergies, current medications, past family history, past medical history, past social history, past surgical history and problem list.      Past Medical History:  Past Medical History:   Diagnosis Date   • Bulging lumbar disc    • Cancer (CMS/HCC)     Skiin Cancer - vagina   • Chronic ITP (idiopathic thrombocytopenia) (CMS/HCC)    • Clotting disorder (CMS/HCC)    • Elevated cholesterol    • Factor 5 Leiden mutation, heterozygous (CMS/HCC)    • Heart murmur    • History of transfusion     FFP   • Hypertension     ocassionally   • Lupus (CMS/HCC)    • Pulmonary emboli (CMS/HCC)        Social  History:  Social History     Socioeconomic History   • Marital status:      Spouse name: Not on file   • Number of children: Not on file   • Years of education: Not on file   • Highest education level: Not on file   Tobacco Use   • Smoking status: Never Smoker   • Smokeless tobacco: Never Used   Substance and Sexual Activity   • Alcohol use: No   • Drug use: No   • Sexual activity: Defer   She is  and not currently in a relationship.  She has 2 sons at home, 17 and 11 years old.  She completed the eighth grade but dropped out of high school after that.  She currently supports herself with disability due to physical ailments.  She denies any substance use including alcohol, tobacco, and illicit substances.    Family History:  Family History   Problem Relation Age of Onset   • Breast cancer Maternal Aunt    Patient has a sister with bipolar schizophrenia.    Past Surgical History:  Past Surgical History:   Procedure Laterality Date   • ABDOMINAL SURGERY     •  SECTION      x2   • D&C HYSTEROSCOPY ENDOMETRIAL ABLATION N/A 2017    Procedure: DILATATION AND CURETTAGE, HYSTEROSCOPY, ABLATION(PT.GETTING PLATELETS DAY BEFORE SURGERY) OUT PT.SURGERY TO CHECK PLATELETS DAY OF SURGERY;  Surgeon: Gail White DO;  Location: [facility];  Service:    • FRACTURE SURGERY Left     left foot ligament repair   • LEEP      x2   • SKIN BIOPSY     • VAGINAL BIOPSY N/A 2019    Procedure: EXCISION OF VAGINAL LESION;  Surgeon: Jared Morgan MD;  Location: [facility];  Service: Obstetrics/Gynecology       Problem List:  Patient Active Problem List   Diagnosis   • Lupus (CMS/HCC)   • Factor V Leiden (CMS/HCC)       Allergy:   No Known Allergies     Current Medications:   Current Outpatient Medications   Medication Sig Dispense Refill   • Biotin (BIOTIN 5000) 5 MG capsule Take 5 mg by mouth Daily.     • cholecalciferol (VITAMIN D3) 400 units tablet Take 400 Units by mouth Daily.     • fluticasone  "(FLONASE) 50 MCG/ACT nasal spray      • hydroxychloroquine (PLAQUENIL) 200 MG tablet Take 200 mg by mouth 2 (Two) Times a Day With Meals.  2   • loratadine (CLARITIN) 10 MG tablet      • mycophenolate (CELLCEPT) 500 MG tablet Take 1,000 mg by mouth 2 (Two) Times a Day.  2   • Omega-3 Fatty Acids (FISH OIL) 1000 MG capsule capsule Take 1,000 mg by mouth Daily With Breakfast.     • Prenatal Vit-Fe Fumarate-FA (PRENATAL, CLASSIC, VITAMIN) 28-0.8 MG tablet tablet Take  by mouth Daily.     • sertraline (ZOLOFT) 50 MG tablet Take 1 tablet by mouth Daily. 30 tablet 2   • busPIRone (BUSPAR) 15 MG tablet Take 1 tablet by mouth 3 (Three) Times a Day. 90 tablet 2     No current facility-administered medications for this visit.        Review of Symptoms:    Review of Systems   Constitutional: Negative for activity change, appetite change, chills, fatigue and fever.   HENT: Negative.    Eyes: Negative.    Respiratory: Negative.    Cardiovascular: Negative.    Gastrointestinal: Negative for diarrhea, nausea and vomiting.   Endocrine: Negative.    Genitourinary: Negative.    Musculoskeletal: Negative.    Skin: Negative.    Allergic/Immunologic: Negative.    Neurological: Negative.    Hematological:        Factor V    Psychiatric/Behavioral: Positive for decreased concentration. Negative for agitation, behavioral problems, dysphoric mood, hallucinations, self-injury, sleep disturbance, suicidal ideas, negative for hyperactivity and stress. The patient is not nervous/anxious.          Physical Exam:   Blood pressure 130/80, pulse 73, height 170.2 cm (67\"), weight 70 kg (154 lb 6.4 oz), not currently breastfeeding.    Appearance: Well-dressed  female of stated age in no acute distress  Gait, Station, Strength: Within normal limits    Mental Status Exam:   Hygiene:   good  Cooperation:  Cooperative  Eye Contact:  Good  Psychomotor Behavior:  Appropriate  Affect:  Full range  Mood: anxious  Hopelessness: Denies  Speech:  " Normal  Thought Process:  Goal directed and Linear  Thought Content:  Normal  Suicidal:  None  Homicidal:  None  Hallucinations:  None  Delusion:  None  Memory:  Intact  Orientation:  Person, Place, Time and Situation  Reliability:  good  Insight:  Good  Judgement:  Good  Impulse Control:  Good  Physical/Medical Issues:  Yes Factor V Leiden and lupus       Lab Results:   No visits with results within 1 Month(s) from this visit.   Latest known visit with results is:   Admission on 09/24/2019, Discharged on 09/24/2019   Component Date Value Ref Range Status   • HCG, Urine, QL 09/24/2019 Negative  Negative Final   • Lot Number 09/24/2019 [id number]   Final   • Internal Positive Control 09/24/2019 Positive   Final   • Internal Negative Control 09/24/2019 Negative   Final   • Case Report 09/24/2019    Final                    Value:Surgical Pathology Report                         Case: [id number]                                  Authorizing Provider:  Jared Morgan MD      Collected:           09/24/2019 12:07 PM          Ordering Location:     [facility]      Received:            09/24/2019 01:21 PM                                 OPERATING ROOM DEPARTMENT                                                    Pathologist:           Davey Valdivia MD                                                             Specimen:    Vulva                                                                                     • Clinical Information 09/24/2019    Final                    Value:This result contains rich text formatting which cannot be displayed here.   • Final Diagnosis 09/24/2019    Final                    Value:This result contains rich text formatting which cannot be displayed here.       Assessment/Plan   Diagnoses and all orders for this visit:    PMDD (premenstrual dysphoric disorder)  -     sertraline (ZOLOFT) 50 MG tablet; Take 1 tablet by mouth Daily.  -     busPIRone (BUSPAR) 15 MG tablet; Take 1  tablet by mouth 3 (Three) Times a Day.    -Continue Zoloft 50 mg p.o. daily  -Increase BuSpar to 15 mg 3 times daily for anxiety  -Patient is exhibiting symptoms of premenstrual dysphoric disorder.  She does not want to be on more medications if necessary so we will do symptomatic dosing in which patient will take medication once a month around the time of her menses and symptom onset.  Due to her intermittent use of blood thinning medications and history of factor V Leiden, antidepressants should be avoided if possible.  There is a minimal but potentially additive effect of slowing platelet aggregation from SSRI medications.  She has constant platelet monitoring and the thinning effect could actually be beneficial to her factor V Leiden if SSRIs end up having to be used to control her symptoms.  Using buspirone in order to avoid potential complications.  -Encouraged to follow-up with neurology      Visit Diagnoses:    ICD-10-CM ICD-9-CM   1. PMDD (premenstrual dysphoric disorder) F32.81 625.4       TREATMENT PLAN/GOALS: Continue supportive psychotherapy efforts and medications as indicated. Treatment and medication options discussed during today's visit. Patient ackowledged and verbally consented to continue with current treatment plan and was educated on the importance of compliance with treatment and follow-up appointments.    MEDICATION ISSUES:    Discussed medication options and treatment plan of prescribed medication as well as the risks, benefits, and side effects including potential falls, possible impaired driving and metabolic adversities among others. Patient is agreeable to call the office with any worsening of symptoms or onset of side effects. Patient is agreeable to call 911 or go to the nearest ER should he/she begin having SI/HI. No medication side effects or related complaints today.     MEDS ORDERED DURING VISIT:  New Medications Ordered This Visit   Medications   • sertraline (ZOLOFT) 50 MG  tablet     Sig: Take 1 tablet by mouth Daily.     Dispense:  30 tablet     Refill:  2   • busPIRone (BUSPAR) 15 MG tablet     Sig: Take 1 tablet by mouth 3 (Three) Times a Day.     Dispense:  90 tablet     Refill:  2       Return in about 3 months (around 2/13/2020).             This document has been electronically signed by Nithin Tyler MD  November 15, 2019 3:05 PM

## 2020-03-12 ENCOUNTER — OFFICE VISIT (OUTPATIENT)
Dept: PSYCHIATRY | Facility: CLINIC | Age: 45
End: 2020-03-12

## 2020-03-12 VITALS
SYSTOLIC BLOOD PRESSURE: 129 MMHG | HEART RATE: 86 BPM | DIASTOLIC BLOOD PRESSURE: 85 MMHG | WEIGHT: 151 LBS | HEIGHT: 67 IN | BODY MASS INDEX: 23.7 KG/M2

## 2020-03-12 DIAGNOSIS — D68.51 FACTOR V LEIDEN (HCC): ICD-10-CM

## 2020-03-12 DIAGNOSIS — F41.1 GENERALIZED ANXIETY DISORDER: ICD-10-CM

## 2020-03-12 DIAGNOSIS — M32.9 LUPUS (HCC): ICD-10-CM

## 2020-03-12 DIAGNOSIS — F32.81 PMDD (PREMENSTRUAL DYSPHORIC DISORDER): Primary | ICD-10-CM

## 2020-03-12 PROCEDURE — 99214 OFFICE O/P EST MOD 30 MIN: CPT | Performed by: PSYCHIATRY & NEUROLOGY

## 2020-03-12 RX ORDER — MYCOPHENOLATE MOFETIL 200 MG/ML
7.5 POWDER, FOR SUSPENSION ORAL EVERY 12 HOURS
COMMUNITY
Start: 2019-09-04

## 2020-03-12 RX ORDER — LISINOPRIL 2.5 MG/1
TABLET ORAL
COMMUNITY
Start: 2020-02-24 | End: 2021-05-17

## 2020-03-12 RX ORDER — HYDROXYCHLOROQUINE SULFATE 200 MG/1
TABLET, FILM COATED ORAL
COMMUNITY
Start: 2019-09-04 | End: 2020-03-12 | Stop reason: SDUPTHER

## 2020-03-12 RX ORDER — BUSPIRONE HYDROCHLORIDE 15 MG/1
15 TABLET ORAL 3 TIMES DAILY
Qty: 90 TABLET | Refills: 2 | Status: SHIPPED | OUTPATIENT
Start: 2020-03-12 | End: 2020-09-21 | Stop reason: SDUPTHER

## 2020-03-12 RX ORDER — SERTRALINE HYDROCHLORIDE 100 MG/1
100 TABLET, FILM COATED ORAL DAILY
Qty: 30 TABLET | Refills: 2 | Status: SHIPPED | OUTPATIENT
Start: 2020-03-12 | End: 2020-09-21

## 2020-03-12 NOTE — PROGRESS NOTES
"Subjective   Emily Zavala is a 44 y.o. female who presents today for follow up    Chief Complaint: Anxiety    History of Present Illness: Patient is a 44-year-old  female with no significant psychiatric history presenting for follow-up for anxiety and PMDD.   Patient reports that her mood and anxiety have been, \"okay.\"  She states that they overall remain improved but she continues to have some anxiety episodes.  She also reports that her appetite fluctuates with periods of eating heavily for approximately 2 weeks and then 2 weeks of food tasting bad and appetite being poor.  These are usually timed around her menstrual cycle.  She reports that her follow-up for factor V Leyden deficiency has been getting her platelet count has remained high.  She has not had any flareups for lupus.  She had her neurology evaluation but did not have any episodes when that she was monitored so she may have to go in for an extended video EEG.  Patient is not currently having any side effects from medication.  We will increase Zoloft today to try to maximize benefit for PMDD and anxiety.  Patient denies SI/HI/AVH.    The following portions of the patient's history were reviewed and updated as appropriate: allergies, current medications, past family history, past medical history, past social history, past surgical history and problem list.      Past Medical History:  Past Medical History:   Diagnosis Date   • Bulging lumbar disc    • Cancer (CMS/HCC)     Skiin Cancer - vagina   • Chronic ITP (idiopathic thrombocytopenia) (CMS/HCC)    • Clotting disorder (CMS/HCC)    • Elevated cholesterol    • Factor 5 Leiden mutation, heterozygous (CMS/HCC)    • Heart murmur    • History of transfusion     FFP   • Hypertension     ocassionally   • Lupus (CMS/HCC)    • Pulmonary emboli (CMS/HCC)        Social History:  Social History     Socioeconomic History   • Marital status:      Spouse name: Not on file   • Number of children: Not " on file   • Years of education: Not on file   • Highest education level: Not on file   Tobacco Use   • Smoking status: Never Smoker   • Smokeless tobacco: Never Used   Substance and Sexual Activity   • Alcohol use: No   • Drug use: No   • Sexual activity: Defer   She is  and not currently in a relationship.  She has 2 sons at home, 17 and 11 years old.  She completed the eighth grade but dropped out of high school after that.  She currently supports herself with disability due to physical ailments.  She denies any substance use including alcohol, tobacco, and illicit substances.    Family History:  Family History   Problem Relation Age of Onset   • Breast cancer Maternal Aunt    Patient has a sister with bipolar schizophrenia.    Past Surgical History:  Past Surgical History:   Procedure Laterality Date   • ABDOMINAL SURGERY     •  SECTION      x2   • D&C HYSTEROSCOPY ENDOMETRIAL ABLATION N/A 2017    Procedure: DILATATION AND CURETTAGE, HYSTEROSCOPY, ABLATION(PT.GETTING PLATELETS DAY BEFORE SURGERY) OUT PT.SURGERY TO CHECK PLATELETS DAY OF SURGERY;  Surgeon: Gail White DO;  Location: [facility] OR;  Service:    • FRACTURE SURGERY Left     left foot ligament repair   • LEEP      x2   • SKIN BIOPSY     • VAGINAL BIOPSY N/A 2019    Procedure: EXCISION OF VAGINAL LESION;  Surgeon: Jared Morgan MD;  Location: [facility] OR;  Service: Obstetrics/Gynecology       Problem List:  Patient Active Problem List   Diagnosis   • Lupus (CMS/HCC)   • Factor V Leiden (CMS/HCC)       Allergy:   No Known Allergies     Current Medications:   Current Outpatient Medications   Medication Sig Dispense Refill   • Biotin (BIOTIN 5000) 5 MG capsule Take 5 mg by mouth Daily.     • busPIRone (BUSPAR) 15 MG tablet Take 1 tablet by mouth 3 (Three) Times a Day. 90 tablet 2   • cholecalciferol (VITAMIN D3) 400 units tablet Take 400 Units by mouth Daily.     • fluticasone (FLONASE) 50 MCG/ACT nasal spray      •  hydroxychloroquine (PLAQUENIL) 200 MG tablet Take 200 mg by mouth 2 (Two) Times a Day With Meals.  2   • loratadine (CLARITIN) 10 MG tablet      • mycophenolate (CELLCEPT) 500 MG tablet Take 1,000 mg by mouth 2 (Two) Times a Day.  2   • Omega-3 Fatty Acids (FISH OIL) 1000 MG capsule capsule Take 1,000 mg by mouth Daily With Breakfast.     • Prenatal Vit-Fe Fumarate-FA (PRENATAL, CLASSIC, VITAMIN) 28-0.8 MG tablet tablet Take  by mouth Daily.     • sertraline (ZOLOFT) 50 MG tablet Take 1 tablet by mouth Daily. 30 tablet 2     No current facility-administered medications for this visit.        Review of Symptoms:    Review of Systems   Constitutional: Negative for activity change, appetite change, chills, fatigue and fever.   HENT: Negative.    Eyes: Negative.    Respiratory: Negative.    Cardiovascular: Negative.    Gastrointestinal: Negative for diarrhea, nausea and vomiting.   Endocrine: Negative.    Genitourinary: Negative.    Musculoskeletal: Negative.    Skin: Negative.    Allergic/Immunologic: Negative.    Neurological: Negative.    Hematological:        Factor V    Psychiatric/Behavioral: Positive for stress. Negative for agitation, behavioral problems, decreased concentration, dysphoric mood, hallucinations, self-injury, sleep disturbance, suicidal ideas and negative for hyperactivity. The patient is nervous/anxious (intermittent episodes).          Physical Exam:   not currently breastfeeding.    Appearance: Well-dressed  female of stated age in no acute distress  Gait, Station, Strength: Within normal limits    Mental Status Exam:   Hygiene:   good  Cooperation:  Cooperative  Eye Contact:  Good  Psychomotor Behavior:  Appropriate  Affect:  Full range  Mood: normal, anxiety fluctuates with menstrual cycle  Hopelessness: Denies  Speech:  Normal  Thought Process:  Goal directed and Linear  Thought Content:  Normal  Suicidal:  None  Homicidal:  None  Hallucinations:  None  Delusion:  None  Memory:   Intact  Orientation:  Person, Place, Time and Situation  Reliability:  good  Insight:  Good  Judgement:  Good  Impulse Control:  Good  Physical/Medical Issues:  Yes Factor V Leiden and lupus       Lab Results:   No visits with results within 1 Month(s) from this visit.   Latest known visit with results is:   Admission on 09/24/2019, Discharged on 09/24/2019   Component Date Value Ref Range Status   • HCG, Urine, QL 09/24/2019 Negative  Negative Final   • Lot Number 09/24/2019 [id number]   Final   • Internal Positive Control 09/24/2019 Positive   Final   • Internal Negative Control 09/24/2019 Negative   Final   • Case Report 09/24/2019    Final                    Value:Surgical Pathology Report                         Case: [id number]                                  Authorizing Provider:  Jared Morgan MD      Collected:           09/24/2019 12:07 PM          Ordering Location:     [facility]      Received:            09/24/2019 01:21 PM                                 OPERATING ROOM DEPARTMENT                                                    Pathologist:           Davey Valdivia MD                                                             Specimen:    Vulva                                                                                     • Clinical Information 09/24/2019    Final                    Value:This result contains rich text formatting which cannot be displayed here.   • Final Diagnosis 09/24/2019    Final                    Value:This result contains rich text formatting which cannot be displayed here.       Assessment/Plan   Diagnoses and all orders for this visit:    PMDD (premenstrual dysphoric disorder)  -     sertraline (ZOLOFT) 100 MG tablet; Take 1 tablet by mouth Daily.  -     busPIRone (BUSPAR) 15 MG tablet; Take 1 tablet by mouth 3 (Three) Times a Day.    Generalized anxiety disorder  -     sertraline (ZOLOFT) 100 MG tablet; Take 1 tablet by mouth Daily.  -     busPIRone  (BUSPAR) 15 MG tablet; Take 1 tablet by mouth 3 (Three) Times a Day.    Lupus (CMS/HCC)    Factor V Leiden (CMS/HCC)    -Patient relatively stable with overall improved symptoms that she continues to have episodes related to her menstrual cycle.  -Reviewed previous documentation  -Reviewed most recent available labs  -Increase Zoloft to 100 mg p.o. daily to see if he can gain any more anxiety control and mood control especially around the time of her menstrual cycle when PMDD flares up  -Increase BuSpar to 15 mg 3 times daily for anxiety  -Encouraged to follow-up with neurology as scheduled  -Encouraged to follow-up with primary care provider for other medical comorbidities including lupus and factor V Leiden deficiency      Visit Diagnoses:    ICD-10-CM ICD-9-CM   1. PMDD (premenstrual dysphoric disorder) F32.81 625.4   2. Generalized anxiety disorder F41.1 300.02   3. Lupus (CMS/HCC) M32.9 710.0   4. Factor V Leiden (CMS/HCC) D68.51 289.81       TREATMENT PLAN/GOALS: Continue supportive psychotherapy efforts and medications as indicated. Treatment and medication options discussed during today's visit. Patient ackowledged and verbally consented to continue with current treatment plan and was educated on the importance of compliance with treatment and follow-up appointments.    MEDICATION ISSUES:    Discussed medication options and treatment plan of prescribed medication as well as the risks, benefits, and side effects including potential falls, possible impaired driving and metabolic adversities among others. Patient is agreeable to call the office with any worsening of symptoms or onset of side effects. Patient is agreeable to call 911 or go to the nearest ER should he/she begin having SI/HI. No medication side effects or related complaints today.     MEDS ORDERED DURING VISIT:  New Medications Ordered This Visit   Medications   • sertraline (ZOLOFT) 100 MG tablet     Sig: Take 1 tablet by mouth Daily.      Dispense:  30 tablet     Refill:  2   • busPIRone (BUSPAR) 15 MG tablet     Sig: Take 1 tablet by mouth 3 (Three) Times a Day.     Dispense:  90 tablet     Refill:  2       Return in about 3 months (around 6/12/2020).             This document has been electronically signed by Nithin Tyler MD  March 12, 2020 09:02

## 2020-06-08 ENCOUNTER — HOSPITAL ENCOUNTER (OUTPATIENT)
Dept: MAMMOGRAPHY | Facility: HOSPITAL | Age: 45
Discharge: HOME | End: 2020-06-08
Admitting: NURSE PRACTITIONER

## 2020-06-08 DIAGNOSIS — Z12.31 VISIT FOR SCREENING MAMMOGRAM: ICD-10-CM

## 2020-06-08 PROCEDURE — 77067 SCR MAMMO BI INCL CAD: CPT

## 2020-06-08 PROCEDURE — 77063 BREAST TOMOSYNTHESIS BI: CPT | Performed by: RADIOLOGY

## 2020-06-08 PROCEDURE — 77067 SCR MAMMO BI INCL CAD: CPT | Performed by: RADIOLOGY

## 2020-06-08 PROCEDURE — 77063 BREAST TOMOSYNTHESIS BI: CPT

## 2020-09-21 ENCOUNTER — OFFICE VISIT (OUTPATIENT)
Dept: PSYCHIATRY | Facility: CLINIC | Age: 45
End: 2020-09-21

## 2020-09-21 VITALS
HEIGHT: 67 IN | TEMPERATURE: 97.5 F | WEIGHT: 143.6 LBS | BODY MASS INDEX: 22.54 KG/M2 | DIASTOLIC BLOOD PRESSURE: 74 MMHG | SYSTOLIC BLOOD PRESSURE: 122 MMHG | HEART RATE: 73 BPM

## 2020-09-21 DIAGNOSIS — F19.94 SUBSTANCE INDUCED MOOD DISORDER (HCC): ICD-10-CM

## 2020-09-21 DIAGNOSIS — F32.81 PMDD (PREMENSTRUAL DYSPHORIC DISORDER): Primary | ICD-10-CM

## 2020-09-21 DIAGNOSIS — F41.1 GENERALIZED ANXIETY DISORDER: ICD-10-CM

## 2020-09-21 PROCEDURE — 99214 OFFICE O/P EST MOD 30 MIN: CPT | Performed by: PSYCHIATRY & NEUROLOGY

## 2020-09-21 RX ORDER — MYCOPHENOLATE MOFETIL 500 MG/1
TABLET ORAL
COMMUNITY
Start: 2020-09-16 | End: 2020-09-21

## 2020-09-21 RX ORDER — BUSPIRONE HYDROCHLORIDE 15 MG/1
15 TABLET ORAL 3 TIMES DAILY
Qty: 90 TABLET | Refills: 2 | Status: SHIPPED | OUTPATIENT
Start: 2020-09-21 | End: 2021-01-19

## 2020-09-21 RX ORDER — MONTELUKAST SODIUM 10 MG/1
TABLET ORAL
COMMUNITY
Start: 2020-09-16 | End: 2022-03-02

## 2020-09-21 RX ORDER — AMOXICILLIN AND CLAVULANATE POTASSIUM 875; 125 MG/1; MG/1
TABLET, FILM COATED ORAL
COMMUNITY
Start: 2020-08-18 | End: 2022-03-02

## 2020-09-21 RX ORDER — SERTRALINE HYDROCHLORIDE 25 MG/1
25 TABLET, FILM COATED ORAL DAILY
COMMUNITY
Start: 2020-08-30 | End: 2020-09-21

## 2020-09-21 RX ORDER — SERTRALINE HYDROCHLORIDE 100 MG/1
150 TABLET, FILM COATED ORAL DAILY
Qty: 45 TABLET | Refills: 2 | Status: SHIPPED | OUTPATIENT
Start: 2020-09-21 | End: 2021-02-15 | Stop reason: SDUPTHER

## 2020-09-21 RX ORDER — LEVETIRACETAM 750 MG/1
TABLET ORAL
COMMUNITY
Start: 2020-09-14 | End: 2022-07-26 | Stop reason: SDUPTHER

## 2020-09-21 RX ORDER — FAMOTIDINE 20 MG/1
TABLET, FILM COATED ORAL
COMMUNITY
Start: 2020-09-16

## 2020-09-22 NOTE — PROGRESS NOTES
Subjective   Emily Zavala is a 45 y.o. female who presents today for follow up    Chief Complaint: Anxiety    History of Present Illness: Patient is a 45-year-old  female with no significant psychiatric history presenting for follow-up for anxiety and PMDD.  Patient reports that since last visit she had video EEG monitoring at  and was found to be having seizures in her sleep.  She was started on Keppra.  She states that this has lessened in severity and symptom burden of her seizures but she continues to have seizures.  She also reports that since being placed on Keppra she has noticed some emotional disturbance.  She feels more labile and emotional during the day.  She also notices that her premenstrual dysphoric symptoms have worsened.  She had previously done very well on the current regimen so we will increase Zoloft slightly today to see if we can gain any more benefit.  She currently denies SI/HI/AVH.  She denies issues with sleep or appetite.    The following portions of the patient's history were reviewed and updated as appropriate: allergies, current medications, past family history, past medical history, past social history, past surgical history and problem list.      Past Medical History:  Past Medical History:   Diagnosis Date   • Bulging lumbar disc    • Cancer (CMS/HCC)     Skiin Cancer - vagina   • Chronic ITP (idiopathic thrombocytopenia) (CMS/HCC)    • Clotting disorder (CMS/HCC)    • Elevated cholesterol    • Factor 5 Leiden mutation, heterozygous (CMS/HCC)    • Heart murmur    • History of transfusion     FFP   • Hypertension     ocassionally   • Lupus (CMS/HCC)    • Pulmonary emboli (CMS/HCC)    • Seizures (CMS/HCC)        Social History:  Social History     Socioeconomic History   • Marital status:      Spouse name: Not on file   • Number of children: Not on file   • Years of education: Not on file   • Highest education level: Not on file   Tobacco Use   • Smoking status:  Never Smoker   • Smokeless tobacco: Never Used   Substance and Sexual Activity   • Alcohol use: No   • Drug use: No   • Sexual activity: Defer   She is  and not currently in a relationship.  She has 2 sons at home, 17 and 11 years old.  She completed the eighth grade but dropped out of high school after that.  She currently supports herself with disability due to physical ailments.  She denies any substance use including alcohol, tobacco, and illicit substances.    Family History:  Family History   Problem Relation Age of Onset   • Breast cancer Maternal Aunt    Patient has a sister with bipolar schizophrenia.    Past Surgical History:  Past Surgical History:   Procedure Laterality Date   • ABDOMINAL SURGERY     •  SECTION      x2   • D&C HYSTEROSCOPY ENDOMETRIAL ABLATION N/A 2017    Procedure: DILATATION AND CURETTAGE, HYSTEROSCOPY, ABLATION(PT.GETTING PLATELETS DAY BEFORE SURGERY) OUT PT.SURGERY TO CHECK PLATELETS DAY OF SURGERY;  Surgeon: Gail White DO;  Location: [facility];  Service:    • FRACTURE SURGERY Left     left foot ligament repair   • LEEP      x2   • SKIN BIOPSY     • VAGINAL BIOPSY N/A 2019    Procedure: EXCISION OF VAGINAL LESION;  Surgeon: Jared Morgan MD;  Location: [facility];  Service: Obstetrics/Gynecology       Problem List:  Patient Active Problem List   Diagnosis   • Lupus (CMS/HCC)   • Factor V Leiden (CMS/HCC)       Allergy:   No Known Allergies     Current Medications:   Current Outpatient Medications   Medication Sig Dispense Refill   • amoxicillin-clavulanate (AUGMENTIN) 875-125 MG per tablet      • Biotin (BIOTIN 5000) 5 MG capsule Take 5 mg by mouth Daily.     • busPIRone (BUSPAR) 15 MG tablet Take 1 tablet by mouth 3 (Three) Times a Day. 90 tablet 2   • cholecalciferol (VITAMIN D3) 400 units tablet Take 400 Units by mouth Daily.     • Cyanocobalamin (VITAMIN B-12) 5000 MCG sublingual tablet one tablet daily     • famotidine (PEPCID) 20 MG  "tablet      • fluticasone (FLONASE) 50 MCG/ACT nasal spray      • hydroxychloroquine (PLAQUENIL) 200 MG tablet Take 200 mg by mouth 2 (Two) Times a Day With Meals.  2   • levETIRAcetam (KEPPRA) 750 MG tablet      • lisinopril (PRINIVIL,ZESTRIL) 2.5 MG tablet      • loratadine (CLARITIN) 10 MG tablet      • montelukast (SINGULAIR) 10 MG tablet      • mycophenolate (CELLCEPT) 200 MG/ML suspension Take 7.5 mL by mouth Every 12 (Twelve) Hours.     • Omega-3 Fatty Acids (FISH OIL) 1000 MG capsule capsule Take 1,000 mg by mouth Daily With Breakfast.     • Prenatal Vit-Fe Fumarate-FA (PRENATAL, CLASSIC, VITAMIN) 28-0.8 MG tablet tablet Take  by mouth Daily.     • sertraline (Zoloft) 100 MG tablet Take 1.5 tablets by mouth Daily. 45 tablet 2     No current facility-administered medications for this visit.        Review of Symptoms:    Review of Systems   Constitutional: Negative for activity change, appetite change, chills, fatigue and fever.   HENT: Negative.    Eyes: Negative.    Respiratory: Negative.    Cardiovascular: Negative.    Gastrointestinal: Negative for diarrhea, nausea and vomiting.   Endocrine: Negative.    Genitourinary: Negative.    Musculoskeletal: Negative.    Skin: Negative.    Allergic/Immunologic: Negative.    Neurological: Negative.    Hematological:        Factor V    Psychiatric/Behavioral: Positive for dysphoric mood and stress. Negative for agitation, behavioral problems, decreased concentration, hallucinations, self-injury, sleep disturbance, suicidal ideas and negative for hyperactivity. The patient is nervous/anxious (intermittent episodes).          Physical Exam:   Blood pressure 122/74, pulse 73, temperature 97.5 °F (36.4 °C), height 170.2 cm (67.01\"), weight 65.1 kg (143 lb 9.6 oz), not currently breastfeeding.    Appearance: Well-dressed  female of stated age in no acute distress  Gait, Station, Strength: Within normal limits    Mental Status Exam:   Hygiene:   good  Cooperation:  " Cooperative  Eye Contact:  Good  Psychomotor Behavior:  Appropriate  Affect:  Full range  Mood: fluctates, more emotionally labile   Hopelessness: Denies  Speech:  Normal  Thought Process:  Goal directed and Linear  Thought Content:  Normal  Suicidal:  None  Homicidal:  None  Hallucinations:  None  Delusion:  None  Memory:  Intact  Orientation:  Person, Place, Time and Situation  Reliability:  good  Insight:  Good  Judgement:  Good  Impulse Control:  Good  Physical/Medical Issues:  Yes Factor V Leiden and lupus       Lab Results:   No visits with results within 1 Month(s) from this visit.   Latest known visit with results is:   Admission on 09/24/2019, Discharged on 09/24/2019   Component Date Value Ref Range Status   • HCG, Urine, QL 09/24/2019 Negative  Negative Final   • Lot Number 09/24/2019 [id number]   Final   • Internal Positive Control 09/24/2019 Positive   Final   • Internal Negative Control 09/24/2019 Negative   Final   • Case Report 09/24/2019    Final                    Value:Surgical Pathology Report                         Case: [id number]                                  Authorizing Provider:  Jared Morgan MD      Collected:           09/24/2019 12:07 PM          Ordering Location:     [facility]      Received:            09/24/2019 01:21 PM                                 OPERATING ROOM DEPARTMENT                                                    Pathologist:           Davey Valdivia MD                                                             Specimen:    Vulva                                                                                     • Clinical Information 09/24/2019    Final                    Value:This result contains rich text formatting which cannot be displayed here.   • Final Diagnosis 09/24/2019    Final                    Value:This result contains rich text formatting which cannot be displayed here.       Assessment/Plan   Diagnoses and all orders for this  visit:    PMDD (premenstrual dysphoric disorder)  -     busPIRone (BUSPAR) 15 MG tablet; Take 1 tablet by mouth 3 (Three) Times a Day.  -     sertraline (Zoloft) 100 MG tablet; Take 1.5 tablets by mouth Daily.    Generalized anxiety disorder  -     busPIRone (BUSPAR) 15 MG tablet; Take 1 tablet by mouth 3 (Three) Times a Day.  -     sertraline (Zoloft) 100 MG tablet; Take 1.5 tablets by mouth Daily.    Substance induced mood disorder (CMS/HCC)  Comments:  Secondary to Keppra  Orders:  -     sertraline (Zoloft) 100 MG tablet; Take 1.5 tablets by mouth Daily.    -Patient was previously doing well on current medication regimen but is now having worsening mood lability and elevated emotional states with crying spells which worsened after she initiated Keppra treatment for seizure disorder.  As a result, she currently meets criteria for substance-induced mood disorder due to Keppra's effects on her overall level of depression and mood lability in addition to her existing diagnosis of PMDD and JOEY.  -Reviewed previous documentation  -Reviewed most recent available labs  -Increase Zoloft to 150 mg p.o. daily to see if he can gain any more anxiety control and mood control especially around the time of her menstrual cycle when PMDD flares up  -Continue BuSpar to 15 mg 3 times daily for anxiety  -Encouraged to follow-up with neurology as scheduled  -Encouraged to follow-up with primary care provider for other medical comorbidities including lupus and factor V Leiden deficiency       Visit Diagnoses:    ICD-10-CM ICD-9-CM   1. PMDD (premenstrual dysphoric disorder)  F32.81 625.4   2. Generalized anxiety disorder  F41.1 300.02   3. Substance induced mood disorder (CMS/HCC)  F19.94 292.84       TREATMENT PLAN/GOALS: Continue supportive psychotherapy efforts and medications as indicated. Treatment and medication options discussed during today's visit. Patient ackowledged and verbally consented to continue with current treatment  plan and was educated on the importance of compliance with treatment and follow-up appointments.    MEDICATION ISSUES:    Discussed medication options and treatment plan of prescribed medication as well as the risks, benefits, and side effects including potential falls, possible impaired driving and metabolic adversities among others. Patient is agreeable to call the office with any worsening of symptoms or onset of side effects. Patient is agreeable to call 911 or go to the nearest ER should he/she begin having SI/HI. No medication side effects or related complaints today.     MEDS ORDERED DURING VISIT:  New Medications Ordered This Visit   Medications   • busPIRone (BUSPAR) 15 MG tablet     Sig: Take 1 tablet by mouth 3 (Three) Times a Day.     Dispense:  90 tablet     Refill:  2   • sertraline (Zoloft) 100 MG tablet     Sig: Take 1.5 tablets by mouth Daily.     Dispense:  45 tablet     Refill:  2       Return in about 8 weeks (around 11/16/2020).             This document has been electronically signed by Nithin Tyler MD  September 22, 2020 08:48 EDT

## 2021-01-18 DIAGNOSIS — F32.81 PMDD (PREMENSTRUAL DYSPHORIC DISORDER): ICD-10-CM

## 2021-01-18 DIAGNOSIS — F41.1 GENERALIZED ANXIETY DISORDER: ICD-10-CM

## 2021-01-19 RX ORDER — BUSPIRONE HYDROCHLORIDE 15 MG/1
TABLET ORAL
Qty: 90 TABLET | Refills: 2 | Status: SHIPPED | OUTPATIENT
Start: 2021-01-19 | End: 2021-02-15 | Stop reason: SDUPTHER

## 2021-02-15 ENCOUNTER — TELEMEDICINE (OUTPATIENT)
Dept: PSYCHIATRY | Facility: CLINIC | Age: 46
End: 2021-02-15

## 2021-02-15 DIAGNOSIS — F19.94 SUBSTANCE INDUCED MOOD DISORDER (HCC): ICD-10-CM

## 2021-02-15 DIAGNOSIS — F41.1 GENERALIZED ANXIETY DISORDER: ICD-10-CM

## 2021-02-15 DIAGNOSIS — F32.81 PMDD (PREMENSTRUAL DYSPHORIC DISORDER): ICD-10-CM

## 2021-02-15 PROCEDURE — 99214 OFFICE O/P EST MOD 30 MIN: CPT | Performed by: PSYCHIATRY & NEUROLOGY

## 2021-02-15 RX ORDER — BUSPIRONE HYDROCHLORIDE 15 MG/1
15 TABLET ORAL 3 TIMES DAILY
Qty: 90 TABLET | Refills: 2 | Status: SHIPPED | OUTPATIENT
Start: 2021-02-15 | End: 2021-05-17 | Stop reason: SDUPTHER

## 2021-02-15 RX ORDER — SERTRALINE HYDROCHLORIDE 100 MG/1
150 TABLET, FILM COATED ORAL DAILY
Qty: 45 TABLET | Refills: 2 | Status: SHIPPED | OUTPATIENT
Start: 2021-02-15 | End: 2021-05-11

## 2021-02-15 NOTE — PROGRESS NOTES
Subjective   Emily Zavala is a 45 y.o. female who presents today for follow up    Chief Complaint: Anxiety    This provider is located at [facility] at [address] [place] [address]. The provider identified himself as well as his credentials.   The Patient is at home using her phone because problems with video connection. The patient's condition being diagnosed/treated is appropriate for telemedicine. The patient gave consent to be seen remotely, and when consent is given they understand that the consent allows for patient identifiable information to be sent to a third party as needed.   They may refuse to be seen remotely at any time. The electronic data is encrypted and password protected, and the patient has been advised of the potential risks to privacy not withstanding such measures      History of Present Illness: Patient is a 45-year-old  female with no significant psychiatric history presenting for follow-up for anxiety and PMDD.  Patient reports that after last visit, the increase in Zoloft seemed to be beneficial. Her mood and anxiety symptoms improved and her PMDD was less severe in mood swings and dysphoria. She is continuing to have seizures that are more frequent the week prior to menstruation and mostly absent in between. She also reports that she has lost weight and her gastrointestinal issues appear to be gall bladder related and she is now on Pepcid and Zantac. Despite the increased stress and dysphoria she does feel mood and anxiety are relatively stable. She denies any medication side effects. She currently denies SI/HI/AVH.  She denies issues with sleep.    The following portions of the patient's history were reviewed and updated as appropriate: allergies, current medications, past family history, past medical history, past social history, past surgical history and problem list.      Past Medical History:  Past Medical History:   Diagnosis Date   •  Bulging lumbar disc    • Cancer (CMS/HCC)     Skiin Cancer - vagina   • Chronic ITP (idiopathic thrombocytopenia) (CMS/HCC)    • Clotting disorder (CMS/HCC)    • Elevated cholesterol    • Factor 5 Leiden mutation, heterozygous (CMS/HCC)    • Heart murmur    • History of transfusion     FFP   • Hypertension     ocassionally   • Lupus (CMS/HCC)    • Pulmonary emboli (CMS/HCC)    • Seizures (CMS/HCC)        Social History:  Social History     Socioeconomic History   • Marital status:      Spouse name: Not on file   • Number of children: Not on file   • Years of education: Not on file   • Highest education level: Not on file   Tobacco Use   • Smoking status: Never Smoker   • Smokeless tobacco: Never Used   Substance and Sexual Activity   • Alcohol use: No   • Drug use: No   • Sexual activity: Defer   She is  and not currently in a relationship.  She has 2 sons at home, 17 and 11 years old.  She completed the eighth grade but dropped out of high school after that.  She currently supports herself with disability due to physical ailments.  She denies any substance use including alcohol, tobacco, and illicit substances.    Family History:  Family History   Problem Relation Age of Onset   • Breast cancer Maternal Aunt    Patient has a sister with bipolar schizophrenia.    Past Surgical History:  Past Surgical History:   Procedure Laterality Date   • ABDOMINAL SURGERY     •  SECTION      x2   • D&C HYSTEROSCOPY ENDOMETRIAL ABLATION N/A 2017    Procedure: DILATATION AND CURETTAGE, HYSTEROSCOPY, ABLATION(PT.GETTING PLATELETS DAY BEFORE SURGERY) OUT PT.SURGERY TO CHECK PLATELETS DAY OF SURGERY;  Surgeon: Gail White DO;  Location: [facility] OR;  Service:    • FRACTURE SURGERY Left     left foot ligament repair   • LEEP      x2   • SKIN BIOPSY     • VAGINAL BIOPSY N/A 2019    Procedure: EXCISION OF VAGINAL LESION;  Surgeon: Jared Morgan MD;  Location: [facility] OR;  Service:  Obstetrics/Gynecology       Problem List:  Patient Active Problem List   Diagnosis   • Lupus (CMS/HCC)   • Factor V Leiden (CMS/[facility])       Allergy:   No Known Allergies     Current Medications:   Current Outpatient Medications   Medication Sig Dispense Refill   • amoxicillin-clavulanate (AUGMENTIN) 875-125 MG per tablet      • Biotin (BIOTIN 5000) 5 MG capsule Take 5 mg by mouth Daily.     • busPIRone (BUSPAR) 15 MG tablet Take 1 tablet by mouth 3 (Three) Times a Day. 90 tablet 2   • cholecalciferol (VITAMIN D3) 400 units tablet Take 400 Units by mouth Daily.     • Cyanocobalamin (VITAMIN B-12) 5000 MCG sublingual tablet one tablet daily     • famotidine (PEPCID) 20 MG tablet      • fluticasone (FLONASE) 50 MCG/ACT nasal spray      • hydroxychloroquine (PLAQUENIL) 200 MG tablet Take 200 mg by mouth 2 (Two) Times a Day With Meals.  2   • levETIRAcetam (KEPPRA) 750 MG tablet      • lisinopril (PRINIVIL,ZESTRIL) 2.5 MG tablet      • loratadine (CLARITIN) 10 MG tablet      • montelukast (SINGULAIR) 10 MG tablet      • mycophenolate (CELLCEPT) 200 MG/ML suspension Take 7.5 mL by mouth Every 12 (Twelve) Hours.     • Omega-3 Fatty Acids (FISH OIL) 1000 MG capsule capsule Take 1,000 mg by mouth Daily With Breakfast.     • Prenatal Vit-Fe Fumarate-FA (PRENATAL, CLASSIC, VITAMIN) 28-0.8 MG tablet tablet Take  by mouth Daily.     • sertraline (Zoloft) 100 MG tablet Take 1.5 tablets by mouth Daily. 45 tablet 2     No current facility-administered medications for this visit.        Review of Symptoms:    Review of Systems   Constitutional: Negative for activity change, appetite change, chills, fatigue and fever.   HENT: Negative.    Eyes: Negative.    Respiratory: Negative.    Cardiovascular: Negative.    Gastrointestinal: Positive for nausea. Negative for diarrhea and vomiting.   Endocrine: Negative.    Genitourinary: Negative.    Musculoskeletal: Negative.    Skin: Negative.    Allergic/Immunologic: Negative.    Neurological:  Negative.    Hematological:        Factor V    Psychiatric/Behavioral: Positive for dysphoric mood and stress. Negative for agitation, behavioral problems, decreased concentration, hallucinations, self-injury, sleep disturbance, suicidal ideas and negative for hyperactivity. The patient is nervous/anxious (intermittent episodes).          Physical Exam:   not currently breastfeeding. Unable to assess, telephone visit     Appearance: Unable to assess, telephone visit   Gait, Station, Strength: Unable to assess, telephone visit     Mental Status Exam:   Hygiene:   Unable to assess, telephone visit   Cooperation:  Cooperative  Eye Contact:  Unable to assess, telephone visit   Psychomotor Behavior:  Unable to assess, telephone visit   Affect:  Full range  Mood: fluctates, but improving, coping well, mood improved   Hopelessness: Denies  Speech:  Normal  Thought Process:  Goal directed and Linear  Thought Content:  Normal  Suicidal:  None  Homicidal:  None  Hallucinations:  None  Delusion:  None  Memory:  Intact  Orientation:  Person, Place, Time and Situation  Reliability:  good  Insight:  Good  Judgement:  Good  Impulse Control:  Good  Physical/Medical Issues:  Yes Factor V Leiden and lupus       Lab Results:   No visits with results within 1 Month(s) from this visit.   Latest known visit with results is:   Admission on 09/24/2019, Discharged on 09/24/2019   Component Date Value Ref Range Status   • HCG, Urine, QL 09/24/2019 Negative  Negative Final   • Lot Number 09/24/2019 [id number]   Final   • Internal Positive Control 09/24/2019 Positive   Final   • Internal Negative Control 09/24/2019 Negative   Final   • Case Report 09/24/2019    Final                    Value:Surgical Pathology Report                         Case: [id number]                                  Authorizing Provider:  Jared Morgan MD      Collected:           09/24/2019 12:07 PM          Ordering Location:     [facility]       Received:            09/24/2019 01:21 PM                                 OPERATING ROOM DEPARTMENT                                                    Pathologist:           Davey Valdivia MD                                                             Specimen:    Vulva                                                                                     • Clinical Information 09/24/2019    Final                    Value:This result contains rich text formatting which cannot be displayed here.   • Final Diagnosis 09/24/2019    Final                    Value:This result contains rich text formatting which cannot be displayed here.       Assessment/Plan   Diagnoses and all orders for this visit:    1. PMDD (premenstrual dysphoric disorder)  -     busPIRone (BUSPAR) 15 MG tablet; Take 1 tablet by mouth 3 (Three) Times a Day.  Dispense: 90 tablet; Refill: 2  -     sertraline (Zoloft) 100 MG tablet; Take 1.5 tablets by mouth Daily.  Dispense: 45 tablet; Refill: 2    2. Generalized anxiety disorder  -     busPIRone (BUSPAR) 15 MG tablet; Take 1 tablet by mouth 3 (Three) Times a Day.  Dispense: 90 tablet; Refill: 2  -     sertraline (Zoloft) 100 MG tablet; Take 1.5 tablets by mouth Daily.  Dispense: 45 tablet; Refill: 2    3. Substance induced mood disorder (CMS/HCC)  Comments:  Secondary to Keppra  Orders:  -     sertraline (Zoloft) 100 MG tablet; Take 1.5 tablets by mouth Daily.  Dispense: 45 tablet; Refill: 2    -Patient doing relatively well. She has some anxiety and dysphoria related to general medical issues but feels it is stable and she is able to cope appropriately. She continues to have seizures and be on Keppra. PMDD symptoms have improved but seizures also worsen around the time of menstruation and treatment with OCP's is contraindicated due to Factor V Leiden deficiency   -Reviewed previous documentation  -Reviewed most recent available labs  -Continue Zoloft 150 mg p.o. daily for anxiety, PMDD, and mood disorder  related to Keppra   -Continue BuSpar to 15 mg 3 times daily for anxiety  -Encouraged to follow-up with neurology as scheduled  -Encouraged to follow-up with primary care provider for other medical comorbidities including lupus and factor V Leiden deficiency   -Approximate appointment time 10:20 AM to 10:40 AM via telephone visit       Visit Diagnoses:    ICD-10-CM ICD-9-CM   1. PMDD (premenstrual dysphoric disorder)  F32.81 625.4   2. Generalized anxiety disorder  F41.1 300.02   3. Substance induced mood disorder (CMS/HCC)  F19.94 292.84       TREATMENT PLAN/GOALS: Continue supportive psychotherapy efforts and medications as indicated. Treatment and medication options discussed during today's visit. Patient ackowledged and verbally consented to continue with current treatment plan and was educated on the importance of compliance with treatment and follow-up appointments.    MEDICATION ISSUES:    Discussed medication options and treatment plan of prescribed medication as well as the risks, benefits, and side effects including potential falls, possible impaired driving and metabolic adversities among others. Patient is agreeable to call the office with any worsening of symptoms or onset of side effects. Patient is agreeable to call 911 or go to the nearest ER should he/she begin having SI/HI. No medication side effects or related complaints today.     MEDS ORDERED DURING VISIT:  New Medications Ordered This Visit   Medications   • busPIRone (BUSPAR) 15 MG tablet     Sig: Take 1 tablet by mouth 3 (Three) Times a Day.     Dispense:  90 tablet     Refill:  2   • sertraline (Zoloft) 100 MG tablet     Sig: Take 1.5 tablets by mouth Daily.     Dispense:  45 tablet     Refill:  2       Return in about 3 months (around 5/15/2021).             This document has been electronically signed by Nithin Tyler MD  February 15, 2021 12:08 EST

## 2021-03-23 ENCOUNTER — BULK ORDERING (OUTPATIENT)
Dept: CASE MANAGEMENT | Facility: OTHER | Age: 46
End: 2021-03-23

## 2021-03-23 DIAGNOSIS — Z23 IMMUNIZATION DUE: ICD-10-CM

## 2021-05-11 DIAGNOSIS — F41.1 GENERALIZED ANXIETY DISORDER: ICD-10-CM

## 2021-05-11 DIAGNOSIS — F19.94 SUBSTANCE INDUCED MOOD DISORDER (HCC): ICD-10-CM

## 2021-05-11 DIAGNOSIS — F32.81 PMDD (PREMENSTRUAL DYSPHORIC DISORDER): ICD-10-CM

## 2021-05-11 RX ORDER — SERTRALINE HYDROCHLORIDE 100 MG/1
TABLET, FILM COATED ORAL
Qty: 45 TABLET | Refills: 2 | Status: SHIPPED | OUTPATIENT
Start: 2021-05-11 | End: 2021-05-17 | Stop reason: SDUPTHER

## 2021-05-17 ENCOUNTER — OFFICE VISIT (OUTPATIENT)
Dept: PSYCHIATRY | Facility: CLINIC | Age: 46
End: 2021-05-17

## 2021-05-17 VITALS
HEART RATE: 74 BPM | DIASTOLIC BLOOD PRESSURE: 78 MMHG | WEIGHT: 135 LBS | HEIGHT: 67 IN | SYSTOLIC BLOOD PRESSURE: 116 MMHG | BODY MASS INDEX: 21.19 KG/M2

## 2021-05-17 DIAGNOSIS — F41.1 GENERALIZED ANXIETY DISORDER: ICD-10-CM

## 2021-05-17 DIAGNOSIS — F19.94 SUBSTANCE INDUCED MOOD DISORDER (HCC): ICD-10-CM

## 2021-05-17 DIAGNOSIS — F32.81 PMDD (PREMENSTRUAL DYSPHORIC DISORDER): ICD-10-CM

## 2021-05-17 PROCEDURE — 99214 OFFICE O/P EST MOD 30 MIN: CPT | Performed by: PSYCHIATRY & NEUROLOGY

## 2021-05-17 RX ORDER — SERTRALINE HYDROCHLORIDE 100 MG/1
150 TABLET, FILM COATED ORAL DAILY
Qty: 45 TABLET | Refills: 2 | Status: SHIPPED | OUTPATIENT
Start: 2021-05-17 | End: 2021-12-27 | Stop reason: SDUPTHER

## 2021-05-17 RX ORDER — BUSPIRONE HYDROCHLORIDE 15 MG/1
15 TABLET ORAL 3 TIMES DAILY
Qty: 90 TABLET | Refills: 2 | Status: SHIPPED | OUTPATIENT
Start: 2021-05-17 | End: 2021-10-13

## 2021-05-17 NOTE — PROGRESS NOTES
Subjective   Emily Zavala is a 45 y.o. female who presents today for follow up    Chief Complaint: Anxiety      History of Present Illness: Patient reports that she has been relatively stable and at baseline since last visit.  She continues to have some mild anxiety and dysphoria but is managing appropriately.  She is not having any medication side effects.  She reports that sleep is good and appetite is somewhat improved, however patient continues to lose weight.  She has a history of lupus and factor V Leiden deficiency and is being evaluated and followed by her primary care providers for these medical conditions which may be contributing to her fatigue and weight loss.  Her iron was found to be low so she started supplementing with over-the-counter iron and vitamin D.  She denies SI/HI/AVH.  Patient continues to have seizures but they are less severe and less frequent.  She often has mood changes and seizures the week after menstruation and endorses some paranoia and fatigue during that time as well.    The following portions of the patient's history were reviewed and updated as appropriate: allergies, current medications, past family history, past medical history, past social history, past surgical history and problem list.      Past Medical History:  Past Medical History:   Diagnosis Date   • Bulging lumbar disc    • Cancer (CMS/HCC)     Skiin Cancer - vagina   • Chronic ITP (idiopathic thrombocytopenia) (CMS/HCC)    • Clotting disorder (CMS/HCC)    • Elevated cholesterol    • Factor 5 Leiden mutation, heterozygous (CMS/HCC)    • Heart murmur    • History of transfusion     FFP   • Hypertension     ocassionally   • Lupus (CMS/HCC)    • Pulmonary emboli (CMS/HCC)    • Seizures (CMS/HCC)        Social History:  Social History     Socioeconomic History   • Marital status:      Spouse name: Not on file   • Number of children: Not on file   • Years of education: Not on file   • Highest education level: Not  on file   Tobacco Use   • Smoking status: Never Smoker   • Smokeless tobacco: Never Used   Vaping Use   • Vaping Use: Never used   Substance and Sexual Activity   • Alcohol use: No   • Drug use: No   • Sexual activity: Defer   She is  and not currently in a relationship.  She has 2 sons at home, 17 and 11 years old.  She completed the eighth grade but dropped out of high school after that.  She currently supports herself with disability due to physical ailments.  She denies any substance use including alcohol, tobacco, and illicit substances.    Family History:  Family History   Problem Relation Age of Onset   • Breast cancer Maternal Aunt    Patient has a sister with bipolar schizophrenia.    Past Surgical History:  Past Surgical History:   Procedure Laterality Date   • ABDOMINAL SURGERY     •  SECTION      x2   • D & C HYSTEROSCOPY ENDOMETRIAL ABLATION N/A 2017    Procedure: DILATATION AND CURETTAGE, HYSTEROSCOPY, ABLATION(PT.GETTING PLATELETS DAY BEFORE SURGERY) OUT PT.SURGERY TO CHECK PLATELETS DAY OF SURGERY;  Surgeon: Gail White DO;  Location: [facility] OR;  Service:    • FRACTURE SURGERY Left     left foot ligament repair   • LEEP      x2   • SKIN BIOPSY     • VAGINAL BIOPSY N/A 2019    Procedure: EXCISION OF VAGINAL LESION;  Surgeon: Jared Morgan MD;  Location: [facility];  Service: Obstetrics/Gynecology       Problem List:  Patient Active Problem List   Diagnosis   • Lupus (CMS/HCC)   • Factor V Leiden (CMS/HCC)       Allergy:   No Known Allergies     Current Medications:   Current Outpatient Medications   Medication Sig Dispense Refill   • amoxicillin-clavulanate (AUGMENTIN) 875-125 MG per tablet      • Biotin (BIOTIN 5000) 5 MG capsule Take 5 mg by mouth Daily.     • busPIRone (BUSPAR) 15 MG tablet Take 1 tablet by mouth 3 (Three) Times a Day. 90 tablet 2   • cholecalciferol (VITAMIN D3) 400 units tablet Take 400 Units by mouth Daily.     • Cyanocobalamin (VITAMIN  "B-12) 5000 MCG sublingual tablet one tablet daily     • famotidine (PEPCID) 20 MG tablet      • fluticasone (FLONASE) 50 MCG/ACT nasal spray      • hydroxychloroquine (PLAQUENIL) 200 MG tablet Take 200 mg by mouth 2 (Two) Times a Day With Meals.  2   • levETIRAcetam (KEPPRA) 750 MG tablet      • loratadine (CLARITIN) 10 MG tablet      • montelukast (SINGULAIR) 10 MG tablet      • mycophenolate (CELLCEPT) 200 MG/ML suspension Take 7.5 mL by mouth Every 12 (Twelve) Hours.     • Omega-3 Fatty Acids (FISH OIL) 1000 MG capsule capsule Take 1,000 mg by mouth Daily With Breakfast.     • sertraline (ZOLOFT) 100 MG tablet Take 1.5 tablets by mouth Daily. 45 tablet 2     No current facility-administered medications for this visit.       Review of Symptoms:    Review of Systems   Constitutional: Positive for activity change, appetite change and fatigue. Negative for chills and fever.   HENT: Negative.    Eyes: Negative.    Respiratory: Negative.    Cardiovascular: Negative.    Gastrointestinal: Negative for diarrhea, nausea and vomiting.   Endocrine: Negative.    Genitourinary: Negative.    Musculoskeletal: Negative.    Skin: Negative.    Allergic/Immunologic: Negative.    Neurological: Negative.    Hematological:        Factor V    Psychiatric/Behavioral: Positive for stress. Negative for agitation, behavioral problems, decreased concentration, dysphoric mood, hallucinations, self-injury, sleep disturbance, suicidal ideas and negative for hyperactivity. The patient is not nervous/anxious (intermittent episodes).          Physical Exam:   Blood pressure 116/78, pulse 74, height 170.2 cm (67.01\"), weight 61.2 kg (135 lb), not currently breastfeeding.     Appearance: CF of stated age in no acute distress  Gait, Station, Strength: WNL    Mental Status Exam:   Hygiene:   good  Cooperation:  Cooperative  Eye Contact:  Good  Psychomotor Behavior:  Appropriate  Affect:  Full range  Mood: normal, stable   Hopelessness: Denies  Speech: "  Normal  Thought Process:  Goal directed and Linear  Thought Content:  Normal  Suicidal:  None  Homicidal:  None  Hallucinations:  None  Delusion:  None  Memory:  Intact  Orientation:  Person, Place, Time and Situation  Reliability:  good  Insight:  Good  Judgement:  Good  Impulse Control:  Good  Physical/Medical Issues:  Yes Factor V Leiden and lupus       Lab Results:   No visits with results within 1 Month(s) from this visit.   Latest known visit with results is:   Admission on 09/24/2019, Discharged on 09/24/2019   Component Date Value Ref Range Status   • HCG, Urine, QL 09/24/2019 Negative  Negative Final   • Lot Number 09/24/2019 [id number]   Final   • Internal Positive Control 09/24/2019 Positive   Final   • Internal Negative Control 09/24/2019 Negative   Final   • Case Report 09/24/2019    Final                    Value:Surgical Pathology Report                         Case: [id number]                                  Authorizing Provider:  Jared Morgan MD      Collected:           09/24/2019 12:07 PM          Ordering Location:     [facility]      Received:            09/24/2019 01:21 PM                                 OPERATING ROOM DEPARTMENT                                                    Pathologist:           Davey Valdivia MD                                                             Specimen:    Vulva                                                                                     • Clinical Information 09/24/2019    Final                    Value:This result contains rich text formatting which cannot be displayed here.   • Final Diagnosis 09/24/2019    Final                    Value:This result contains rich text formatting which cannot be displayed here.       Assessment/Plan   Diagnoses and all orders for this visit:    1. PMDD (premenstrual dysphoric disorder)  -     busPIRone (BUSPAR) 15 MG tablet; Take 1 tablet by mouth 3 (Three) Times a Day.  Dispense: 90 tablet; Refill:  2  -     sertraline (ZOLOFT) 100 MG tablet; Take 1.5 tablets by mouth Daily.  Dispense: 45 tablet; Refill: 2    2. Generalized anxiety disorder  -     busPIRone (BUSPAR) 15 MG tablet; Take 1 tablet by mouth 3 (Three) Times a Day.  Dispense: 90 tablet; Refill: 2  -     sertraline (ZOLOFT) 100 MG tablet; Take 1.5 tablets by mouth Daily.  Dispense: 45 tablet; Refill: 2    3. Substance induced mood disorder (CMS/HCC)  Comments:  Secondary to Keppra  Orders:  -     sertraline (ZOLOFT) 100 MG tablet; Take 1.5 tablets by mouth Daily.  Dispense: 45 tablet; Refill: 2    -Patient continues to do relatively well.  She is stable at baseline with no major symptom burden.  She continues to struggle with chronic fatigue and some weight loss with reduced appetite secondary to chronic medical conditions.  -Reviewed previous documentation  -Reviewed most recent available labs  -Continue Zoloft 150 mg p.o. daily for anxiety, PMDD, and mood disorder related to Keppra   -Continue BuSpar to 15 mg 3 times daily for anxiety  -Encouraged to follow-up with neurology as scheduled  -Encouraged to follow-up with primary care provider for other medical comorbidities including lupus and factor V Leiden deficiency       Visit Diagnoses:    ICD-10-CM ICD-9-CM   1. PMDD (premenstrual dysphoric disorder)  F32.81 625.4   2. Generalized anxiety disorder  F41.1 300.02   3. Substance induced mood disorder (CMS/HCC)  F19.94 292.84       TREATMENT PLAN/GOALS: Continue supportive psychotherapy efforts and medications as indicated. Treatment and medication options discussed during today's visit. Patient ackowledged and verbally consented to continue with current treatment plan and was educated on the importance of compliance with treatment and follow-up appointments.    MEDICATION ISSUES:    Discussed medication options and treatment plan of prescribed medication as well as the risks, benefits, and side effects including potential falls, possible impaired  driving and metabolic adversities among others. Patient is agreeable to call the office with any worsening of symptoms or onset of side effects. Patient is agreeable to call 911 or go to the nearest ER should he/she begin having SI/HI. No medication side effects or related complaints today.     MEDS ORDERED DURING VISIT:  New Medications Ordered This Visit   Medications   • busPIRone (BUSPAR) 15 MG tablet     Sig: Take 1 tablet by mouth 3 (Three) Times a Day.     Dispense:  90 tablet     Refill:  2   • sertraline (ZOLOFT) 100 MG tablet     Sig: Take 1.5 tablets by mouth Daily.     Dispense:  45 tablet     Refill:  2       Return in about 4 months (around 9/17/2021).             This document has been electronically signed by Nithin Tyler MD  May 17, 2021 10:38 EDT

## 2021-05-25 ENCOUNTER — TRANSCRIBE ORDERS (OUTPATIENT)
Dept: ADMINISTRATIVE | Facility: HOSPITAL | Age: 46
End: 2021-05-25

## 2021-05-25 DIAGNOSIS — H93.A2 PULSATILE TINNITUS OF LEFT EAR: Primary | ICD-10-CM

## 2021-06-09 ENCOUNTER — HOSPITAL ENCOUNTER (OUTPATIENT)
Dept: MAMMOGRAPHY | Facility: HOSPITAL | Age: 46
Discharge: HOME | End: 2021-06-09
Admitting: OBSTETRICS & GYNECOLOGY

## 2021-06-09 DIAGNOSIS — Z12.31 VISIT FOR SCREENING MAMMOGRAM: ICD-10-CM

## 2021-06-09 PROCEDURE — 77067 SCR MAMMO BI INCL CAD: CPT | Performed by: RADIOLOGY

## 2021-06-09 PROCEDURE — 77063 BREAST TOMOSYNTHESIS BI: CPT

## 2021-06-09 PROCEDURE — 77067 SCR MAMMO BI INCL CAD: CPT

## 2021-06-09 PROCEDURE — 77063 BREAST TOMOSYNTHESIS BI: CPT | Performed by: RADIOLOGY

## 2021-06-15 ENCOUNTER — HOSPITAL ENCOUNTER (OUTPATIENT)
Dept: CT IMAGING | Facility: HOSPITAL | Age: 46
Discharge: HOME | End: 2021-06-15

## 2021-06-15 DIAGNOSIS — H93.A2 PULSATILE TINNITUS OF LEFT EAR: ICD-10-CM

## 2021-06-15 PROCEDURE — 70496 CT ANGIOGRAPHY HEAD: CPT

## 2021-06-15 PROCEDURE — 70496 CT ANGIOGRAPHY HEAD: CPT | Performed by: RADIOLOGY

## 2021-06-15 PROCEDURE — 70498 CT ANGIOGRAPHY NECK: CPT

## 2021-06-15 PROCEDURE — 25010000002 IOPAMIDOL 61 % SOLUTION: Performed by: OTOLARYNGOLOGY

## 2021-06-15 RX ADMIN — IOPAMIDOL 80 ML: 612 INJECTION, SOLUTION INTRAVENOUS at 14:42

## 2021-06-16 PROCEDURE — 70498 CT ANGIOGRAPHY NECK: CPT | Performed by: RADIOLOGY

## 2021-10-12 DIAGNOSIS — F41.1 GENERALIZED ANXIETY DISORDER: ICD-10-CM

## 2021-10-12 DIAGNOSIS — F32.81 PMDD (PREMENSTRUAL DYSPHORIC DISORDER): ICD-10-CM

## 2021-10-13 RX ORDER — BUSPIRONE HYDROCHLORIDE 15 MG/1
TABLET ORAL
Qty: 90 TABLET | Refills: 2 | Status: SHIPPED | OUTPATIENT
Start: 2021-10-13 | End: 2021-12-27 | Stop reason: SDUPTHER

## 2021-12-27 ENCOUNTER — OFFICE VISIT (OUTPATIENT)
Dept: PSYCHIATRY | Facility: CLINIC | Age: 46
End: 2021-12-27

## 2021-12-27 VITALS
HEIGHT: 67 IN | BODY MASS INDEX: 20.56 KG/M2 | DIASTOLIC BLOOD PRESSURE: 62 MMHG | TEMPERATURE: 98 F | WEIGHT: 131 LBS | HEART RATE: 63 BPM | SYSTOLIC BLOOD PRESSURE: 116 MMHG

## 2021-12-27 DIAGNOSIS — F19.94 SUBSTANCE INDUCED MOOD DISORDER (HCC): ICD-10-CM

## 2021-12-27 DIAGNOSIS — F41.1 GENERALIZED ANXIETY DISORDER: Primary | ICD-10-CM

## 2021-12-27 DIAGNOSIS — F32.81 PMDD (PREMENSTRUAL DYSPHORIC DISORDER): ICD-10-CM

## 2021-12-27 PROCEDURE — 99214 OFFICE O/P EST MOD 30 MIN: CPT | Performed by: PSYCHIATRY & NEUROLOGY

## 2021-12-27 RX ORDER — BUSPIRONE HYDROCHLORIDE 15 MG/1
15 TABLET ORAL 3 TIMES DAILY
Qty: 90 TABLET | Refills: 2 | Status: SHIPPED | OUTPATIENT
Start: 2021-12-27 | End: 2021-12-27 | Stop reason: SDUPTHER

## 2021-12-27 RX ORDER — SERTRALINE HYDROCHLORIDE 100 MG/1
150 TABLET, FILM COATED ORAL DAILY
Qty: 45 TABLET | Refills: 5 | Status: SHIPPED | OUTPATIENT
Start: 2021-12-27 | End: 2022-03-02

## 2021-12-27 RX ORDER — BUSPIRONE HYDROCHLORIDE 15 MG/1
15 TABLET ORAL 3 TIMES DAILY
Qty: 90 TABLET | Refills: 5 | Status: SHIPPED | OUTPATIENT
Start: 2021-12-27 | End: 2022-03-02

## 2021-12-27 RX ORDER — SERTRALINE HYDROCHLORIDE 100 MG/1
150 TABLET, FILM COATED ORAL DAILY
Qty: 45 TABLET | Refills: 2 | Status: SHIPPED | OUTPATIENT
Start: 2021-12-27 | End: 2021-12-27 | Stop reason: SDUPTHER

## 2021-12-28 NOTE — PROGRESS NOTES
Subjective   Emily Zavala is a 46 y.o. female who presents today for follow up    Chief Complaint: Anxiety      History of Present Illness: Patient presenting today for follow-up.  She reports that she has been stable and improved since last visit.  She has not had any further seizure episodes.  She feels that medications are working appropriately and she denies any major mood or anxiety symptoms.  She did recently find out that she has low blood sugar which has been causing some fatigue and lightheadedness.  She denies any issues with sleep or appetite.  She denies SI/HI/AVH.    The following portions of the patient's history were reviewed and updated as appropriate: allergies, current medications, past family history, past medical history, past social history, past surgical history and problem list.      Past Medical History:  Past Medical History:   Diagnosis Date   • Bulging lumbar disc    • Cancer (HCC)     Skiin Cancer - vagina   • Chronic ITP (idiopathic thrombocytopenia) (HCC)    • Clotting disorder (HCC)    • Elevated cholesterol    • Factor 5 Leiden mutation, heterozygous (HCC)    • Heart murmur    • History of transfusion     FFP   • Hypertension     ocassionally   • Lupus (HCC)    • Pulmonary emboli (HCC)    • Seizures (HCC)        Social History:  Social History     Socioeconomic History   • Marital status:    Tobacco Use   • Smoking status: Never Smoker   • Smokeless tobacco: Never Used   Vaping Use   • Vaping Use: Never used   Substance and Sexual Activity   • Alcohol use: No   • Drug use: No   • Sexual activity: Defer   She is  and not currently in a relationship.  She has 2 sons at home, 17 and 11 years old.  She completed the eighth grade but dropped out of high school after that.  She currently supports herself with disability due to physical ailments.  She denies any substance use including alcohol, tobacco, and illicit substances.    Family History:  Family History   Problem  Relation Age of Onset   • Breast cancer Maternal Aunt    Patient has a sister with bipolar schizophrenia.    Past Surgical History:  Past Surgical History:   Procedure Laterality Date   • ABDOMINAL SURGERY     •  SECTION      x2   • D & C HYSTEROSCOPY ENDOMETRIAL ABLATION N/A 2017    Procedure: DILATATION AND CURETTAGE, HYSTEROSCOPY, ABLATION(PT.GETTING PLATELETS DAY BEFORE SURGERY) OUT PT.SURGERY TO CHECK PLATELETS DAY OF SURGERY;  Surgeon: Gail White DO;  Location: [facility] OR;  Service:    • FRACTURE SURGERY Left     left foot ligament repair   • LEEP      x2   • SKIN BIOPSY     • VAGINAL BIOPSY N/A 2019    Procedure: EXCISION OF VAGINAL LESION;  Surgeon: Jared Morgan MD;  Location: [facility] OR;  Service: Obstetrics/Gynecology       Problem List:  Patient Active Problem List   Diagnosis   • Lupus (HCC)   • Factor V Leiden (HCC)       Allergy:   No Known Allergies     Current Medications:   Current Outpatient Medications   Medication Sig Dispense Refill   • apixaban (ELIQUIS) 2.5 MG tablet tablet Take 2.5 mg by mouth 2 (Two) Times a Day.     • Biotin (BIOTIN 5000) 5 MG capsule Take 5 mg by mouth Daily.     • busPIRone (BUSPAR) 15 MG tablet Take 1 tablet by mouth 3 (Three) Times a Day. 90 tablet 5   • Cyanocobalamin (VITAMIN B-12) 5000 MCG sublingual tablet one tablet daily     • famotidine (PEPCID) 20 MG tablet      • levETIRAcetam (KEPPRA) 750 MG tablet      • loratadine (CLARITIN) 10 MG tablet      • mycophenolate (CELLCEPT) 200 MG/ML suspension Take 7.5 mL by mouth Every 12 (Twelve) Hours.     • sertraline (ZOLOFT) 100 MG tablet Take 1.5 tablets by mouth Daily. 45 tablet 5   • amoxicillin-clavulanate (AUGMENTIN) 875-125 MG per tablet      • cholecalciferol (VITAMIN D3) 400 units tablet Take 400 Units by mouth Daily.     • fluticasone (FLONASE) 50 MCG/ACT nasal spray      • hydroxychloroquine (PLAQUENIL) 200 MG tablet Take 200 mg by mouth 2 (Two) Times a Day With Meals.  2   •  "montelukast (SINGULAIR) 10 MG tablet      • Omega-3 Fatty Acids (FISH OIL) 1000 MG capsule capsule Take 1,000 mg by mouth Daily With Breakfast.       No current facility-administered medications for this visit.       Review of Symptoms:    Review of Systems   Constitutional: Negative for activity change, appetite change, chills, fatigue and fever.   HENT: Negative.    Eyes: Negative.    Respiratory: Negative.    Cardiovascular: Negative.    Gastrointestinal: Negative for diarrhea, nausea and vomiting.   Endocrine: Negative.    Genitourinary: Negative.    Musculoskeletal: Negative.    Skin: Negative.    Allergic/Immunologic: Negative.    Neurological: Negative.    Hematological:        Factor V    Psychiatric/Behavioral: Positive for stress. Negative for agitation, behavioral problems, decreased concentration, dysphoric mood, hallucinations, self-injury, sleep disturbance, suicidal ideas and negative for hyperactivity. The patient is not nervous/anxious (intermittent episodes).          Physical Exam:   Blood pressure 116/62, pulse 63, temperature 98 °F (36.7 °C), height 170.2 cm (67\"), weight 59.4 kg (131 lb), not currently breastfeeding.     Appearance: CF of stated age in no acute distress  Gait, Station, Strength: WNL    Mental Status Exam:     Mental Status exam performed 12/27/2021  and patient shows no significant changes from previous exam.     Hygiene:   good  Cooperation:  Cooperative  Eye Contact:  Good  Psychomotor Behavior:  Appropriate  Affect:  Full range  Mood: normal, stable   Hopelessness: Denies  Speech:  Normal  Thought Process:  Goal directed and Linear  Thought Content:  Normal  Suicidal:  None  Homicidal:  None  Hallucinations:  None  Delusion:  None  Memory:  Intact  Orientation:  Person, Place, Time and Situation  Reliability:  good  Insight:  Good  Judgement:  Good  Impulse Control:  Good  Physical/Medical Issues:  Yes Factor V Leiden and lupus       Lab Results:   No visits with results " within 1 Month(s) from this visit.   Latest known visit with results is:   Admission on 09/24/2019, Discharged on 09/24/2019   Component Date Value Ref Range Status   • HCG, Urine, QL 09/24/2019 Negative  Negative Final   • Lot Number 09/24/2019 [id number]   Final   • Internal Positive Control 09/24/2019 Positive   Final   • Internal Negative Control 09/24/2019 Negative   Final   • Case Report 09/24/2019    Final                    Value:Surgical Pathology Report                         Case: [id number]                                  Authorizing Provider:  Jared Morgan MD      Collected:           09/24/2019 12:07 PM          Ordering Location:     [facility]      Received:            09/24/2019 01:21 PM                                 OPERATING ROOM DEPARTMENT                                                    Pathologist:           Davey Valdivia MD                                                             Specimen:    Vulva                                                                                     • Clinical Information 09/24/2019    Final                    Value:This result contains rich text formatting which cannot be displayed here.   • Final Diagnosis 09/24/2019    Final                    Value:This result contains rich text formatting which cannot be displayed here.       Assessment/Plan   Diagnoses and all orders for this visit:    1. Generalized anxiety disorder (Primary)  -     Discontinue: busPIRone (BUSPAR) 15 MG tablet; Take 1 tablet by mouth 3 (Three) Times a Day.  Dispense: 90 tablet; Refill: 2  -     Discontinue: sertraline (ZOLOFT) 100 MG tablet; Take 1.5 tablets by mouth Daily.  Dispense: 45 tablet; Refill: 2  -     busPIRone (BUSPAR) 15 MG tablet; Take 1 tablet by mouth 3 (Three) Times a Day.  Dispense: 90 tablet; Refill: 5  -     sertraline (ZOLOFT) 100 MG tablet; Take 1.5 tablets by mouth Daily.  Dispense: 45 tablet; Refill: 5    2. PMDD (premenstrual dysphoric  disorder)  -     Discontinue: busPIRone (BUSPAR) 15 MG tablet; Take 1 tablet by mouth 3 (Three) Times a Day.  Dispense: 90 tablet; Refill: 2  -     Discontinue: sertraline (ZOLOFT) 100 MG tablet; Take 1.5 tablets by mouth Daily.  Dispense: 45 tablet; Refill: 2  -     busPIRone (BUSPAR) 15 MG tablet; Take 1 tablet by mouth 3 (Three) Times a Day.  Dispense: 90 tablet; Refill: 5  -     sertraline (ZOLOFT) 100 MG tablet; Take 1.5 tablets by mouth Daily.  Dispense: 45 tablet; Refill: 5    3. Substance induced mood disorder (HCC)  Comments:  Secondary to Keppra  Orders:  -     Discontinue: sertraline (ZOLOFT) 100 MG tablet; Take 1.5 tablets by mouth Daily.  Dispense: 45 tablet; Refill: 2  -     sertraline (ZOLOFT) 100 MG tablet; Take 1.5 tablets by mouth Daily.  Dispense: 45 tablet; Refill: 5    -Patient continues to do relatively well without any major symptom burden at this time.  -Reviewed previous documentation  -Reviewed most recent available labs  -Continue Zoloft 150 mg p.o. daily for anxiety, PMDD, and mood disorder related to Keppra   -Continue BuSpar to 15 mg 3 times daily for anxiety  -Encouraged to follow-up with neurology as scheduled  -Encouraged to follow-up with primary care provider for other medical comorbidities including lupus and factor V Leiden deficiency   -Due to sustained stability, we will follow-up in 6 months      Visit Diagnoses:    ICD-10-CM ICD-9-CM   1. PMDD (premenstrual dysphoric disorder)  F32.81 625.4   2. Generalized anxiety disorder  F41.1 300.02   3. Substance induced mood disorder (HCC)  F19.94 292.84       TREATMENT PLAN/GOALS: Continue supportive psychotherapy efforts and medications as indicated. Treatment and medication options discussed during today's visit. Patient ackowledged and verbally consented to continue with current treatment plan and was educated on the importance of compliance with treatment and follow-up appointments.    MEDICATION ISSUES:    Discussed medication  options and treatment plan of prescribed medication as well as the risks, benefits, and side effects including potential falls, possible impaired driving and metabolic adversities among others. Patient is agreeable to call the office with any worsening of symptoms or onset of side effects. Patient is agreeable to call 911 or go to the nearest ER should he/she begin having SI/HI. No medication side effects or related complaints today.     MEDS ORDERED DURING VISIT:  New Medications Ordered This Visit   Medications   • busPIRone (BUSPAR) 15 MG tablet     Sig: Take 1 tablet by mouth 3 (Three) Times a Day.     Dispense:  90 tablet     Refill:  5   • sertraline (ZOLOFT) 100 MG tablet     Sig: Take 1.5 tablets by mouth Daily.     Dispense:  45 tablet     Refill:  5       Return in about 6 months (around 6/27/2022).             This document has been electronically signed by Nithin Tyler MD  December 28, 2021 08:46 EST

## 2022-03-02 ENCOUNTER — OFFICE VISIT (OUTPATIENT)
Dept: ENDOCRINOLOGY | Facility: CLINIC | Age: 47
End: 2022-03-02

## 2022-03-02 VITALS
TEMPERATURE: 97.9 F | HEART RATE: 82 BPM | OXYGEN SATURATION: 100 % | WEIGHT: 130 LBS | DIASTOLIC BLOOD PRESSURE: 79 MMHG | SYSTOLIC BLOOD PRESSURE: 142 MMHG | BODY MASS INDEX: 20.4 KG/M2 | HEIGHT: 67 IN

## 2022-03-02 DIAGNOSIS — E27.49 HYPOCORTISOLISM: Primary | ICD-10-CM

## 2022-03-02 PROCEDURE — 99203 OFFICE O/P NEW LOW 30 MIN: CPT | Performed by: NURSE PRACTITIONER

## 2022-03-02 RX ORDER — LACOSAMIDE 150 MG/1
1 TABLET, FILM COATED ORAL 2 TIMES DAILY
COMMUNITY
Start: 2022-02-21 | End: 2022-07-26 | Stop reason: SDUPTHER

## 2022-03-02 RX ORDER — LAMOTRIGINE 100 MG/1
100 TABLET ORAL 2 TIMES DAILY
COMMUNITY
Start: 2022-02-21 | End: 2022-07-26 | Stop reason: SDUPTHER

## 2022-03-02 RX ORDER — PREDNISONE 1 MG/1
TABLET ORAL
COMMUNITY
Start: 2022-02-21 | End: 2022-03-02 | Stop reason: SDUPTHER

## 2022-03-02 RX ORDER — PREDNISONE 1 MG/1
TABLET ORAL
Qty: 40 TABLET | Refills: 2 | Status: SHIPPED | OUTPATIENT
Start: 2022-03-02

## 2022-03-02 RX ORDER — ROMIPLOSTIM 125 UG/.25ML
INJECTION, POWDER, LYOPHILIZED, FOR SOLUTION SUBCUTANEOUS
COMMUNITY
Start: 2022-01-25

## 2022-03-02 RX ORDER — RIVAROXABAN 2.5 MG/1
TABLET, FILM COATED ORAL
COMMUNITY
Start: 2022-02-21 | End: 2022-12-05

## 2022-03-02 NOTE — ASSESSMENT & PLAN NOTE
-Patient appears asymptomatic and denies any symptoms in office today.  -We will obtain records to determine if any further testing is needed.  -Continue prednisone 5 mg daily.  Discussed with patient that she may need sick day dosing.  She would tell the dose of prednisone on sick days..  -Follow-up in 3 months.

## 2022-03-02 NOTE — PROGRESS NOTES
Chief Complaint   Patient presents with   • Abnormal Lab     initial encounter        Referring Provider  Luisa Rangel, GORDON     HPI   Emily Zavala is a 46 y.o. female had concerns including Abnormal Lab (initial encounter).   Seen as a new patient today for hypocortisolism.    Her symptoms started about 2 to 3 years ago.  She initially had increased weight loss, decreased appetite, muscle weakness, extreme fatigue, hypotension, bradycardia, lightheadedness, salt cravings, hypoglycemia, nausea, diarrhea, abdominal pain, joint/muscle pain, irritability, depression, some mild body hair loss and some mild sexual dysfunction.  She has had intensive testing that determined that she has hypocortisolism.  She was started on 5 mg prednisone daily to which she has noticed a decrease in all of her symptoms.  Her main concern at this time is the fact that she has seizures monthly and is concerned that this may be associated with this.  She has been evaluated by neurology at .    Past Medical History:   Diagnosis Date   • Bulging lumbar disc    • Cancer (HCC)     Skiin Cancer - vagina   • Chronic ITP (idiopathic thrombocytopenia) (HCC)    • Clotting disorder (HCC)    • Elevated cholesterol    • Factor 5 Leiden mutation, heterozygous (HCC)    • Heart murmur    • History of transfusion     FFP   • Hypertension     ocassionally   • Lupus (HCC)    • Pulmonary emboli (HCC)    • Seizures (HCC)      Past Surgical History:   Procedure Laterality Date   • ABDOMINAL SURGERY     •  SECTION      x2   • D & C HYSTEROSCOPY ENDOMETRIAL ABLATION N/A 2017    Procedure: DILATATION AND CURETTAGE, HYSTEROSCOPY, ABLATION(PT.GETTING PLATELETS DAY BEFORE SURGERY) OUT PT.SURGERY TO CHECK PLATELETS DAY OF SURGERY;  Surgeon: Gail White DO;  Location: [facility];  Service:    • FRACTURE SURGERY Left     left foot ligament repair   • LEEP      x2   • SKIN BIOPSY     • VAGINAL BIOPSY N/A 2019    Procedure: EXCISION OF  VAGINAL LESION;  Surgeon: Jared Morgan MD;  Location: [facility];  Service: Obstetrics/Gynecology      Family History   Problem Relation Age of Onset   • Breast cancer Maternal Aunt       Social History     Socioeconomic History   • Marital status:    Tobacco Use   • Smoking status: Never Smoker   • Smokeless tobacco: Never Used   Vaping Use   • Vaping Use: Never used   Substance and Sexual Activity   • Alcohol use: No   • Drug use: No   • Sexual activity: Defer      No Known Allergies   Current Outpatient Medications on File Prior to Visit   Medication Sig Dispense Refill   • cholecalciferol (VITAMIN D3) 400 units tablet Take 400 Units by mouth Daily.     • Cyanocobalamin (VITAMIN B-12) 5000 MCG sublingual tablet one tablet daily     • famotidine (PEPCID) 20 MG tablet      • levETIRAcetam (KEPPRA) 750 MG tablet      • loratadine (CLARITIN) 10 MG tablet      • mycophenolate (CELLCEPT) 200 MG/ML suspension Take 7.5 mL by mouth Every 12 (Twelve) Hours.     • lamoTRIgine (LaMICtal) 100 MG tablet Take 100 mg by mouth 2 (Two) Times a Day.     • Nplate 125 MCG injection INJECT 100 MCG SUBCUTANEOUSLY EVERY 2 WEEKS AS DIRECTED WHEN PLATELLETS LESS THAN 30,000     • predniSONE (DELTASONE) 5 MG tablet TAKE ONE TABLET BY MOUTH ONCE DAILY FOR LOW CORTISOL     • Vimpat 150 MG tablet Take 1 tablet by mouth 2 (Two) Times a Day.     • Xarelto 2.5 MG tablet TAKE ONE TABLET BY MOUTH TWO TIMES A DAY FOR LUPUS     • [DISCONTINUED] amoxicillin-clavulanate (AUGMENTIN) 875-125 MG per tablet      • [DISCONTINUED] apixaban (ELIQUIS) 2.5 MG tablet tablet Take 2.5 mg by mouth 2 (Two) Times a Day.     • [DISCONTINUED] Biotin (BIOTIN 5000) 5 MG capsule Take 5 mg by mouth Daily.     • [DISCONTINUED] busPIRone (BUSPAR) 15 MG tablet Take 1 tablet by mouth 3 (Three) Times a Day. 90 tablet 5   • [DISCONTINUED] fluticasone (FLONASE) 50 MCG/ACT nasal spray      • [DISCONTINUED] hydroxychloroquine (PLAQUENIL) 200 MG tablet Take 200 mg by  "mouth 2 (Two) Times a Day With Meals.  2   • [DISCONTINUED] montelukast (SINGULAIR) 10 MG tablet      • [DISCONTINUED] Omega-3 Fatty Acids (FISH OIL) 1000 MG capsule capsule Take 1,000 mg by mouth Daily With Breakfast.     • [DISCONTINUED] sertraline (ZOLOFT) 100 MG tablet Take 1.5 tablets by mouth Daily. 45 tablet 5     No current facility-administered medications on file prior to visit.        The following portions of the patient's history were reviewed and updated as appropriate: allergies, current medications, past family history, past medical history, past social history, past surgical history and problem list.    Review of Systems   Constitutional: Positive for fatigue and unexpected weight loss.   Eyes: Negative.    Gastrointestinal: Positive for abdominal pain, diarrhea and nausea.   Neurological: Positive for seizures, light-headedness and memory problem.   All other systems reviewed and are negative.    /79 (BP Location: Left arm, Patient Position: Sitting, Cuff Size: Adult)   Pulse 82   Temp 97.9 °F (36.6 °C) (Oral)   Ht 170.2 cm (67\")   Wt 59 kg (130 lb)   LMP 02/05/2022   SpO2 100%   Breastfeeding No   BMI 20.36 kg/m²      Physical Exam  Vitals reviewed.   Constitutional:       Appearance: Normal appearance.   Eyes:      Extraocular Movements: Extraocular movements intact.   Neck:      Thyroid: No thyroid mass, thyromegaly or thyroid tenderness.      Comments: No palpable nodules.  Cardiovascular:      Rate and Rhythm: Normal rate and regular rhythm.      Pulses: Normal pulses.      Heart sounds: Normal heart sounds.   Pulmonary:      Effort: Pulmonary effort is normal.      Breath sounds: Normal breath sounds.   Musculoskeletal:      Right lower leg: No edema.      Left lower leg: No edema.   Skin:     General: Skin is warm.   Neurological:      Mental Status: She is alert and oriented to person, place, and time.   Psychiatric:         Mood and Affect: Mood normal.         Behavior: " Behavior normal.         Thought Content: Thought content normal.         Judgment: Judgment normal.       CMP:  Lab Results   Component Value Date    BUN 12 09/19/2019    CREATININE 0.95 09/19/2019    EGFRIFNONA 65 09/19/2019    BCR 12.6 09/19/2019     09/19/2019    K 3.8 09/19/2019    CO2 26.0 09/19/2019    CALCIUM 9.5 09/19/2019    ALBUMIN 4.30 03/20/2018    LABIL2 1.1 (L) 02/14/2015    BILITOT 0.6 03/20/2018    ALKPHOS 65 03/20/2018    AST 86 (H) 03/20/2018     (H) 03/20/2018     Lipid Panel:  No results found for: CHOL, TRIG, HDL, VLDL, LDL  HbA1c:     Glucose:  No results found for: POCGLU  TSH:  No results found for: TSH    Assessment and Plan    Diagnoses and all orders for this visit:    1. Hypocortisolism (HCC) (Primary)  Assessment & Plan:  -Patient appears asymptomatic and denies any symptoms in office today.  -We will obtain records to determine if any further testing is needed.  -Continue prednisone 5 mg daily.  Discussed with patient that she may need sick day dosing.  She would tell the dose of prednisone on sick days..  -Follow-up in 3 months.         Return in about 3 months (around 6/2/2022) for Follow-up appointment. The patient was instructed to contact the clinic with any interval questions or concerns.    RUSLAN Charles   Endocrinology    Please note that portions of this document were completed with a voice recognition program. Efforts were made to edit the dictations, but occasionally words are mis-transcribed.

## 2022-06-02 ENCOUNTER — LAB (OUTPATIENT)
Dept: LAB | Facility: HOSPITAL | Age: 47
End: 2022-06-02

## 2022-06-02 ENCOUNTER — OFFICE VISIT (OUTPATIENT)
Dept: ENDOCRINOLOGY | Facility: CLINIC | Age: 47
End: 2022-06-02

## 2022-06-02 VITALS
WEIGHT: 132 LBS | BODY MASS INDEX: 20.72 KG/M2 | HEART RATE: 79 BPM | HEIGHT: 67 IN | SYSTOLIC BLOOD PRESSURE: 129 MMHG | OXYGEN SATURATION: 99 % | DIASTOLIC BLOOD PRESSURE: 79 MMHG

## 2022-06-02 DIAGNOSIS — E27.49 HYPOCORTISOLISM: Primary | ICD-10-CM

## 2022-06-02 DIAGNOSIS — R41.3 MEMORY LOSS, SHORT TERM: ICD-10-CM

## 2022-06-02 PROCEDURE — 83970 ASSAY OF PARATHORMONE: CPT | Performed by: NURSE PRACTITIONER

## 2022-06-02 PROCEDURE — 99214 OFFICE O/P EST MOD 30 MIN: CPT | Performed by: NURSE PRACTITIONER

## 2022-06-02 PROCEDURE — 80053 COMPREHEN METABOLIC PANEL: CPT | Performed by: NURSE PRACTITIONER

## 2022-06-02 PROCEDURE — 84146 ASSAY OF PROLACTIN: CPT | Performed by: NURSE PRACTITIONER

## 2022-06-02 PROCEDURE — 36415 COLL VENOUS BLD VENIPUNCTURE: CPT | Performed by: NURSE PRACTITIONER

## 2022-06-02 NOTE — ASSESSMENT & PLAN NOTE
-Patient appears asymptomatic and denies any symptoms in office today.  -Will obtain labs today to monitor status.  Her BP and weight and overall well being appear stable.  -Continue prednisone 5 mg daily.  Discussed with patient that she may need sick day dosing.  She would double the dose of prednisone on sick days..  -Follow-up in 6 months.

## 2022-06-02 NOTE — PROGRESS NOTES
No chief complaint on file.       Referring Provider  No ref. provider found     HPI   Emily Zavala is a 46 y.o. female had no chief complaint listed for this encounter.   Seen as a new patient today for hypocortisolism.     She denies any new symptoms.  Does not have any issues today.    Cortisol History:  Her symptoms started about 2 to 3 years ago.  She initially had increased weight loss, decreased appetite, muscle weakness, extreme fatigue, hypotension, bradycardia, lightheadedness, salt cravings, hypoglycemia, nausea, diarrhea, abdominal pain, joint/muscle pain, irritability, depression, some mild body hair loss and some mild sexual dysfunction.  She has had intensive testing that determined that she has hypocortisolism.  She was started on 5 mg prednisone daily to which she has noticed a decrease in all of her symptoms.  Her main concern at this time is the fact that she has seizures monthly and is concerned that this may be associated with this.  She has been evaluated by neurology at .    Past Medical History:   Diagnosis Date   • Bulging lumbar disc    • Cancer (HCC)     Skiin Cancer - vagina   • Chronic ITP (idiopathic thrombocytopenia) (HCC)    • Clotting disorder (HCC)    • Elevated cholesterol    • Factor 5 Leiden mutation, heterozygous (HCC)    • Heart murmur    • History of transfusion     FFP   • Hypertension     ocassionally   • Lupus (HCC)    • Pulmonary emboli (HCC)    • Seizures (HCC)      Past Surgical History:   Procedure Laterality Date   • ABDOMINAL SURGERY     •  SECTION      x2   • D & C HYSTEROSCOPY ENDOMETRIAL ABLATION N/A 2017    Procedure: DILATATION AND CURETTAGE, HYSTEROSCOPY, ABLATION(PT.GETTING PLATELETS DAY BEFORE SURGERY) OUT PT.SURGERY TO CHECK PLATELETS DAY OF SURGERY;  Surgeon: Gail White DO;  Location: [facility] OR;  Service:    • FRACTURE SURGERY Left     left foot ligament repair   • LEEP      x2   • SKIN BIOPSY     • VAGINAL BIOPSY N/A 2019     Procedure: EXCISION OF VAGINAL LESION;  Surgeon: Jared Morgan MD;  Location: [facility];  Service: Obstetrics/Gynecology      Family History   Problem Relation Age of Onset   • Breast cancer Maternal Aunt       Social History     Socioeconomic History   • Marital status:    Tobacco Use   • Smoking status: Never Smoker   • Smokeless tobacco: Never Used   Vaping Use   • Vaping Use: Never used   Substance and Sexual Activity   • Alcohol use: No   • Drug use: No   • Sexual activity: Defer      No Known Allergies   Current Outpatient Medications on File Prior to Visit   Medication Sig Dispense Refill   • cholecalciferol (VITAMIN D3) 400 units tablet Take 400 Units by mouth Daily.     • Cyanocobalamin (VITAMIN B-12) 5000 MCG sublingual tablet one tablet daily     • famotidine (PEPCID) 20 MG tablet      • lamoTRIgine (LaMICtal) 100 MG tablet Take 100 mg by mouth 2 (Two) Times a Day.     • levETIRAcetam (KEPPRA) 750 MG tablet      • loratadine (CLARITIN) 10 MG tablet      • mycophenolate (CELLCEPT) 200 MG/ML suspension Take 7.5 mL by mouth Every 12 (Twelve) Hours.     • Nplate 125 MCG injection INJECT 100 MCG SUBCUTANEOUSLY EVERY 2 WEEKS AS DIRECTED WHEN PLATELLETS LESS THAN 30,000     • predniSONE (DELTASONE) 5 MG tablet 1 tablet daily.  Sick day dosin tablets. 40 tablet 2   • Vimpat 150 MG tablet Take 1 tablet by mouth 2 (Two) Times a Day.     • Xarelto 2.5 MG tablet TAKE ONE TABLET BY MOUTH TWO TIMES A DAY FOR LUPUS       No current facility-administered medications on file prior to visit.        The following portions of the patient's history were reviewed and updated as appropriate: allergies, current medications, past family history, past medical history, past social history, past surgical history and problem list.    Review of Systems   Constitutional: Positive for fatigue and unexpected weight loss.   Eyes: Negative.    Gastrointestinal: Positive for abdominal pain, diarrhea and nausea.  "  Neurological: Positive for seizures, light-headedness and memory problem.   All other systems reviewed and are negative.    /79 (BP Location: Left arm, Patient Position: Sitting, Cuff Size: Adult)   Pulse 79   Ht 170.2 cm (67\")   Wt 59.9 kg (132 lb)   SpO2 99%   BMI 20.67 kg/m²      Physical Exam  Vitals reviewed.   Constitutional:       Appearance: Normal appearance.   Eyes:      Extraocular Movements: Extraocular movements intact.   Cardiovascular:      Rate and Rhythm: Normal rate.      Pulses: Normal pulses.   Pulmonary:      Effort: Pulmonary effort is normal.   Musculoskeletal:      Right lower leg: No edema.      Left lower leg: No edema.   Neurological:      Mental Status: She is alert and oriented to person, place, and time.   Psychiatric:         Mood and Affect: Mood normal.         Behavior: Behavior normal.         Thought Content: Thought content normal.         Judgment: Judgment normal.       CMP:  Lab Results   Component Value Date    BUN 12 09/19/2019    CREATININE 0.95 09/19/2019    EGFRIFNONA 65 09/19/2019    BCR 12.6 09/19/2019     09/19/2019    K 3.8 09/19/2019    CO2 26.0 09/19/2019    CALCIUM 9.5 09/19/2019    ALBUMIN 4.30 03/20/2018    LABIL2 1.1 (L) 02/14/2015    BILITOT 0.6 03/20/2018    ALKPHOS 65 03/20/2018    AST 86 (H) 03/20/2018     (H) 03/20/2018     Lipid Panel:  No results found for: CHOL, TRIG, HDL, VLDL, LDL  HbA1c:     Glucose:  No results found for: POCGLU  TSH:  No results found for: TSH    Assessment and Plan    Diagnoses and all orders for this visit:    1. Hypocortisolism (HCC) (Primary)  Assessment & Plan:  -Patient appears asymptomatic and denies any symptoms in office today.  -Will obtain labs today to monitor status.  Her BP and weight and overall well being appear stable.  -Continue prednisone 5 mg daily.  Discussed with patient that she may need sick day dosing.  She would double the dose of prednisone on sick days..  -Follow-up in 6 " months.    Orders:  -     Cancel: Cortisol  -     Comprehensive Metabolic Panel    2. Memory loss, short term  Assessment & Plan:  With patient's increased intermittent episodes of memory loss will obtain some labs to make sure nothing underlying is causing these.  We will follow-up as indicated.    Orders:  -     PTH, Intact  -     Prolactin       Return in about 6 months (around 12/2/2022) for Follow-up appointment. The patient was instructed to contact the clinic with any interval questions or concerns.    This document has been electronically signed by RUSLAN Charles  June 2, 2022 15:27 EDT  Endocrinology    Please note that portions of this document were completed with a voice recognition program. Efforts were made to edit the dictations, but occasionally words are mis-transcribed.

## 2022-06-02 NOTE — ASSESSMENT & PLAN NOTE
With patient's increased intermittent episodes of memory loss will obtain some labs to make sure nothing underlying is causing these.  We will follow-up as indicated.

## 2022-06-03 LAB
ALBUMIN SERPL-MCNC: 4.7 G/DL (ref 3.5–5.2)
ALBUMIN/GLOB SERPL: 2.4 G/DL
ALP SERPL-CCNC: 49 U/L (ref 39–117)
ALT SERPL W P-5'-P-CCNC: 12 U/L (ref 1–33)
ANION GAP SERPL CALCULATED.3IONS-SCNC: 11 MMOL/L (ref 5–15)
AST SERPL-CCNC: 12 U/L (ref 1–32)
BILIRUB SERPL-MCNC: 0.3 MG/DL (ref 0–1.2)
BUN SERPL-MCNC: 13 MG/DL (ref 6–20)
BUN/CREAT SERPL: 16.3 (ref 7–25)
CALCIUM SPEC-SCNC: 9.7 MG/DL (ref 8.6–10.5)
CHLORIDE SERPL-SCNC: 105 MMOL/L (ref 98–107)
CO2 SERPL-SCNC: 22 MMOL/L (ref 22–29)
CREAT SERPL-MCNC: 0.8 MG/DL (ref 0.57–1)
EGFRCR SERPLBLD CKD-EPI 2021: 93.3 ML/MIN/1.73
GLOBULIN UR ELPH-MCNC: 2 GM/DL
GLUCOSE SERPL-MCNC: 157 MG/DL (ref 65–99)
POTASSIUM SERPL-SCNC: 4.3 MMOL/L (ref 3.5–5.2)
PROLACTIN SERPL-MCNC: 4 NG/ML (ref 4.79–23.3)
PROT SERPL-MCNC: 6.7 G/DL (ref 6–8.5)
PTH-INTACT SERPL-MCNC: 26.3 PG/ML (ref 15–65)
SODIUM SERPL-SCNC: 138 MMOL/L (ref 136–145)

## 2022-07-25 ENCOUNTER — HOSPITAL ENCOUNTER (EMERGENCY)
Facility: HOSPITAL | Age: 47
Discharge: HOME | End: 2022-07-26
Attending: EMERGENCY MEDICINE | Admitting: EMERGENCY MEDICINE

## 2022-07-25 ENCOUNTER — APPOINTMENT (OUTPATIENT)
Dept: CT IMAGING | Facility: HOSPITAL | Age: 47
End: 2022-07-25

## 2022-07-25 ENCOUNTER — APPOINTMENT (OUTPATIENT)
Dept: GENERAL RADIOLOGY | Facility: HOSPITAL | Age: 47
End: 2022-07-25

## 2022-07-25 DIAGNOSIS — R56.9 SEIZURE: Primary | ICD-10-CM

## 2022-07-25 LAB
ALBUMIN SERPL-MCNC: 5.2 G/DL (ref 3.5–5.2)
ALBUMIN/GLOB SERPL: 2.3 G/DL
ALP SERPL-CCNC: 55 U/L (ref 39–117)
ALT SERPL W P-5'-P-CCNC: 20 U/L (ref 1–33)
AMPHET+METHAMPHET UR QL: NEGATIVE
AMPHETAMINES UR QL: NEGATIVE
ANION GAP SERPL CALCULATED.3IONS-SCNC: 9.9 MMOL/L (ref 5–15)
APAP SERPL-MCNC: <5 MCG/ML (ref 0–30)
AST SERPL-CCNC: 18 U/L (ref 1–32)
BACTERIA UR QL AUTO: ABNORMAL /HPF
BARBITURATES UR QL SCN: NEGATIVE
BASOPHILS # BLD AUTO: 0.02 10*3/MM3 (ref 0–0.2)
BASOPHILS NFR BLD AUTO: 0.2 % (ref 0–1.5)
BENZODIAZ UR QL SCN: NEGATIVE
BILIRUB SERPL-MCNC: 0.3 MG/DL (ref 0–1.2)
BILIRUB UR QL STRIP: NEGATIVE
BUN SERPL-MCNC: 13 MG/DL (ref 6–20)
BUN/CREAT SERPL: 16.3 (ref 7–25)
BUPRENORPHINE SERPL-MCNC: NEGATIVE NG/ML
CALCIUM SPEC-SCNC: 10 MG/DL (ref 8.6–10.5)
CANNABINOIDS SERPL QL: NEGATIVE
CHLORIDE SERPL-SCNC: 103 MMOL/L (ref 98–107)
CK SERPL-CCNC: 76 U/L (ref 20–180)
CLARITY UR: CLEAR
CO2 SERPL-SCNC: 27.1 MMOL/L (ref 22–29)
COCAINE UR QL: NEGATIVE
COLOR UR: YELLOW
CREAT SERPL-MCNC: 0.8 MG/DL (ref 0.57–1)
CRP SERPL-MCNC: <0.3 MG/DL (ref 0–0.5)
D-LACTATE SERPL-SCNC: 1.4 MMOL/L (ref 0.5–2)
DEPRECATED RDW RBC AUTO: 39.9 FL (ref 37–54)
EGFRCR SERPLBLD CKD-EPI 2021: 92.7 ML/MIN/1.73
EOSINOPHIL # BLD AUTO: 0 10*3/MM3 (ref 0–0.4)
EOSINOPHIL NFR BLD AUTO: 0 % (ref 0.3–6.2)
ERYTHROCYTE [DISTWIDTH] IN BLOOD BY AUTOMATED COUNT: 12.4 % (ref 12.3–15.4)
ERYTHROCYTE [SEDIMENTATION RATE] IN BLOOD: 2 MM/HR (ref 0–20)
ETHANOL BLD-MCNC: <10 MG/DL (ref 0–10)
ETHANOL UR QL: <0.01 %
FLUAV RNA RESP QL NAA+PROBE: NOT DETECTED
FLUBV RNA RESP QL NAA+PROBE: NOT DETECTED
GLOBULIN UR ELPH-MCNC: 2.3 GM/DL
GLUCOSE SERPL-MCNC: 107 MG/DL (ref 65–99)
GLUCOSE UR STRIP-MCNC: NEGATIVE MG/DL
HCG SERPL QL: NEGATIVE
HCT VFR BLD AUTO: 42.7 % (ref 34–46.6)
HGB BLD-MCNC: 14.3 G/DL (ref 12–15.9)
HGB UR QL STRIP.AUTO: NEGATIVE
HYALINE CASTS UR QL AUTO: ABNORMAL /LPF
IMM GRANULOCYTES # BLD AUTO: 0.04 10*3/MM3 (ref 0–0.05)
IMM GRANULOCYTES NFR BLD AUTO: 0.3 % (ref 0–0.5)
KETONES UR QL STRIP: NEGATIVE
LEUKOCYTE ESTERASE UR QL STRIP.AUTO: ABNORMAL
LYMPHOCYTES # BLD AUTO: 1.16 10*3/MM3 (ref 0.7–3.1)
LYMPHOCYTES NFR BLD AUTO: 9.9 % (ref 19.6–45.3)
MAGNESIUM SERPL-MCNC: 2.5 MG/DL (ref 1.6–2.6)
MCH RBC QN AUTO: 29.6 PG (ref 26.6–33)
MCHC RBC AUTO-ENTMCNC: 33.5 G/DL (ref 31.5–35.7)
MCV RBC AUTO: 88.4 FL (ref 79–97)
METHADONE UR QL SCN: NEGATIVE
MONOCYTES # BLD AUTO: 0.53 10*3/MM3 (ref 0.1–0.9)
MONOCYTES NFR BLD AUTO: 4.5 % (ref 5–12)
NEUTROPHILS NFR BLD AUTO: 85.1 % (ref 42.7–76)
NEUTROPHILS NFR BLD AUTO: 9.94 10*3/MM3 (ref 1.7–7)
NITRITE UR QL STRIP: NEGATIVE
NRBC BLD AUTO-RTO: 0 /100 WBC (ref 0–0.2)
OPIATES UR QL: NEGATIVE
OXYCODONE UR QL SCN: NEGATIVE
PCP UR QL SCN: NEGATIVE
PH UR STRIP.AUTO: 7.5 [PH] (ref 5–8)
PLATELET # BLD AUTO: 69 10*3/MM3 (ref 140–450)
PMV BLD AUTO: 11.1 FL (ref 6–12)
POTASSIUM SERPL-SCNC: 4.1 MMOL/L (ref 3.5–5.2)
PROPOXYPH UR QL: NEGATIVE
PROT SERPL-MCNC: 7.5 G/DL (ref 6–8.5)
PROT UR QL STRIP: NEGATIVE
RBC # BLD AUTO: 4.83 10*6/MM3 (ref 3.77–5.28)
RBC # UR STRIP: ABNORMAL /HPF
REF LAB TEST METHOD: ABNORMAL
SALICYLATES SERPL-MCNC: <0.3 MG/DL
SARS-COV-2 RNA RESP QL NAA+PROBE: NOT DETECTED
SODIUM SERPL-SCNC: 140 MMOL/L (ref 136–145)
SP GR UR STRIP: 1.01 (ref 1–1.03)
SQUAMOUS #/AREA URNS HPF: ABNORMAL /HPF
T4 FREE SERPL-MCNC: 1.05 NG/DL (ref 0.93–1.7)
TRICYCLICS UR QL SCN: NEGATIVE
TSH SERPL DL<=0.05 MIU/L-ACNC: 2.27 UIU/ML (ref 0.27–4.2)
UROBILINOGEN UR QL STRIP: ABNORMAL
WBC # UR STRIP: ABNORMAL /HPF
WBC NRBC COR # BLD: 11.69 10*3/MM3 (ref 3.4–10.8)
YEAST URNS QL MICRO: ABNORMAL /HPF

## 2022-07-25 PROCEDURE — 82550 ASSAY OF CK (CPK): CPT | Performed by: EMERGENCY MEDICINE

## 2022-07-25 PROCEDURE — 93010 ELECTROCARDIOGRAM REPORT: CPT | Performed by: INTERNAL MEDICINE

## 2022-07-25 PROCEDURE — 96374 THER/PROPH/DIAG INJ IV PUSH: CPT

## 2022-07-25 PROCEDURE — 80179 DRUG ASSAY SALICYLATE: CPT | Performed by: EMERGENCY MEDICINE

## 2022-07-25 PROCEDURE — 82077 ASSAY SPEC XCP UR&BREATH IA: CPT | Performed by: EMERGENCY MEDICINE

## 2022-07-25 PROCEDURE — 99283 EMERGENCY DEPT VISIT LOW MDM: CPT

## 2022-07-25 PROCEDURE — 84703 CHORIONIC GONADOTROPIN ASSAY: CPT | Performed by: EMERGENCY MEDICINE

## 2022-07-25 PROCEDURE — 93005 ELECTROCARDIOGRAM TRACING: CPT | Performed by: EMERGENCY MEDICINE

## 2022-07-25 PROCEDURE — 87636 SARSCOV2 & INF A&B AMP PRB: CPT | Performed by: EMERGENCY MEDICINE

## 2022-07-25 PROCEDURE — 84439 ASSAY OF FREE THYROXINE: CPT | Performed by: EMERGENCY MEDICINE

## 2022-07-25 PROCEDURE — 83605 ASSAY OF LACTIC ACID: CPT | Performed by: EMERGENCY MEDICINE

## 2022-07-25 PROCEDURE — 80050 GENERAL HEALTH PANEL: CPT | Performed by: EMERGENCY MEDICINE

## 2022-07-25 PROCEDURE — 85652 RBC SED RATE AUTOMATED: CPT | Performed by: EMERGENCY MEDICINE

## 2022-07-25 PROCEDURE — 81001 URINALYSIS AUTO W/SCOPE: CPT | Performed by: EMERGENCY MEDICINE

## 2022-07-25 PROCEDURE — 71045 X-RAY EXAM CHEST 1 VIEW: CPT

## 2022-07-25 PROCEDURE — 83735 ASSAY OF MAGNESIUM: CPT | Performed by: EMERGENCY MEDICINE

## 2022-07-25 PROCEDURE — 36415 COLL VENOUS BLD VENIPUNCTURE: CPT

## 2022-07-25 PROCEDURE — 80143 DRUG ASSAY ACETAMINOPHEN: CPT | Performed by: EMERGENCY MEDICINE

## 2022-07-25 PROCEDURE — 70450 CT HEAD/BRAIN W/O DYE: CPT

## 2022-07-25 PROCEDURE — 80306 DRUG TEST PRSMV INSTRMNT: CPT | Performed by: EMERGENCY MEDICINE

## 2022-07-25 PROCEDURE — 86140 C-REACTIVE PROTEIN: CPT | Performed by: EMERGENCY MEDICINE

## 2022-07-25 RX ORDER — SODIUM CHLORIDE 0.9 % (FLUSH) 0.9 %
10 SYRINGE (ML) INJECTION AS NEEDED
Status: DISCONTINUED | OUTPATIENT
Start: 2022-07-25 | End: 2022-07-26 | Stop reason: HOSPADM

## 2022-07-25 RX ORDER — LEVETIRACETAM 10 MG/ML
1000 INJECTION INTRAVASCULAR ONCE
Status: COMPLETED | OUTPATIENT
Start: 2022-07-25 | End: 2022-07-26

## 2022-07-26 VITALS
BODY MASS INDEX: 20.4 KG/M2 | RESPIRATION RATE: 18 BRPM | DIASTOLIC BLOOD PRESSURE: 96 MMHG | WEIGHT: 130 LBS | HEIGHT: 67 IN | SYSTOLIC BLOOD PRESSURE: 138 MMHG | HEART RATE: 107 BPM | TEMPERATURE: 99.4 F | OXYGEN SATURATION: 100 %

## 2022-07-26 PROCEDURE — 0 LEVETIRACETAM IN NACL 0.75% 1000 MG/100ML SOLUTION: Performed by: EMERGENCY MEDICINE

## 2022-07-26 PROCEDURE — 96374 THER/PROPH/DIAG INJ IV PUSH: CPT

## 2022-07-26 RX ORDER — LACOSAMIDE 150 MG/1
1 TABLET, FILM COATED ORAL 2 TIMES DAILY
Qty: 12 TABLET | Refills: 0 | OUTPATIENT
Start: 2022-07-26 | End: 2022-07-26

## 2022-07-26 RX ORDER — LAMOTRIGINE 100 MG/1
100 TABLET ORAL 2 TIMES DAILY
Qty: 60 TABLET | Refills: 0 | OUTPATIENT
Start: 2022-07-26 | End: 2022-07-26

## 2022-07-26 RX ORDER — LEVETIRACETAM 750 MG/1
750 TABLET ORAL 2 TIMES DAILY
Qty: 60 TABLET | Refills: 0 | Status: SHIPPED | OUTPATIENT
Start: 2022-07-26 | End: 2022-08-25

## 2022-07-26 RX ADMIN — SODIUM CHLORIDE 1000 ML: 9 INJECTION, SOLUTION INTRAVENOUS at 00:05

## 2022-07-26 RX ADMIN — LEVETIRACETAM 1000 MG: 10 INJECTION INTRAVENOUS at 00:05

## 2022-07-27 LAB
QT INTERVAL: 352 MS
QTC INTERVAL: 396 MS

## 2022-09-21 ENCOUNTER — APPOINTMENT (OUTPATIENT)
Dept: MAMMOGRAPHY | Facility: HOSPITAL | Age: 47
End: 2022-09-21

## 2022-10-10 ENCOUNTER — TELEMEDICINE (OUTPATIENT)
Dept: PSYCHIATRY | Facility: CLINIC | Age: 47
End: 2022-10-10

## 2022-10-10 DIAGNOSIS — F41.1 GENERALIZED ANXIETY DISORDER: Primary | ICD-10-CM

## 2022-10-10 DIAGNOSIS — F32.81 PMDD (PREMENSTRUAL DYSPHORIC DISORDER): ICD-10-CM

## 2022-10-10 DIAGNOSIS — F19.94 SUBSTANCE INDUCED MOOD DISORDER: ICD-10-CM

## 2022-10-10 PROCEDURE — 99214 OFFICE O/P EST MOD 30 MIN: CPT | Performed by: PSYCHIATRY & NEUROLOGY

## 2022-10-10 RX ORDER — BUSPIRONE HYDROCHLORIDE 15 MG/1
15 TABLET ORAL 3 TIMES DAILY
Qty: 90 TABLET | Refills: 2 | Status: SHIPPED | OUTPATIENT
Start: 2022-10-10 | End: 2023-01-10 | Stop reason: SDUPTHER

## 2022-10-10 RX ORDER — SERTRALINE HYDROCHLORIDE 100 MG/1
150 TABLET, FILM COATED ORAL DAILY
Qty: 45 TABLET | Refills: 2 | Status: SHIPPED | OUTPATIENT
Start: 2022-10-10 | End: 2023-01-10 | Stop reason: SDUPTHER

## 2022-10-10 NOTE — PROGRESS NOTES
Subjective   Emily Zavala is a 47 y.o. female who presents today for follow up    Chief Complaint: Anxiety    This provider is located at [facility], [address] [place] [address]. The Patient is seen remotely at home, using Epic Mychart. Patient is being seen via telehealth and stated they are in a secure environment for this session. The patient’s condition being diagnosed/treated is appropriate for telemedicine. The provider identified himself as well as his credentials.   The patient gave consent to be seen remotely, and when consent is given they understand that the consent allows for patient identifiable information to be sent to a third party as needed.   They may refuse to be seen remotely at any time. The electronic data is encrypted and password protected, and the patient has been advised of the potential risks to privacy not withstanding such measures    History of Present Illness: Patient presented today for follow-up.  Since last visit, she reports that she has been doing well for mood and anxiety standpoint with no major issues.  She does endorse that for the past few months she has started having worsening seizures.  She started having absence seizures and then this progressed to a significant seizure on the day of her birthday.  They feel that this may be hormone related and she is taking clonazepam starting around 5 days before her period starts throughout her menstruation which seems to have reduced the severity and frequency of her seizures.  She is not having any side effects from her mental health medications.  She does dread the winter season and has a lot of birthdays and events coming up so it makes it more stressful.  She denies SI/HI/AVH.    The following portions of the patient's history were reviewed and updated as appropriate: allergies, current medications, past family history, past medical history, past social history, past surgical history and problem list.      Past  Medical History:  Past Medical History:   Diagnosis Date   • Bulging lumbar disc    • Cancer (HCC)     Skiin Cancer - vagina   • Chronic ITP (idiopathic thrombocytopenia) (HCC)    • Clotting disorder (HCC)    • Elevated cholesterol    • Factor 5 Leiden mutation, heterozygous (HCC)    • Heart murmur    • History of transfusion     FFP   • Hypertension     ocassionally   • Lupus (HCC)    • Pulmonary emboli (HCC)    • Seizures (HCC)        Social History:  Social History     Socioeconomic History   • Marital status:    Tobacco Use   • Smoking status: Never   • Smokeless tobacco: Never   Vaping Use   • Vaping Use: Never used   Substance and Sexual Activity   • Alcohol use: No   • Drug use: No   • Sexual activity: Defer   She is  and not currently in a relationship.  She has 2 sons at home, 17 and 11 years old.  She completed the eighth grade but dropped out of high school after that.  She currently supports herself with disability due to physical ailments.  She denies any substance use including alcohol, tobacco, and illicit substances.    Family History:  Family History   Problem Relation Age of Onset   • Breast cancer Maternal Aunt    Patient has a sister with bipolar schizophrenia.    Past Surgical History:  Past Surgical History:   Procedure Laterality Date   • ABDOMINAL SURGERY     •  SECTION      x2   • D & C HYSTEROSCOPY ENDOMETRIAL ABLATION N/A 2017    Procedure: DILATATION AND CURETTAGE, HYSTEROSCOPY, ABLATION(PT.GETTING PLATELETS DAY BEFORE SURGERY) OUT PT.SURGERY TO CHECK PLATELETS DAY OF SURGERY;  Surgeon: Gail White DO;  Location: [facility];  Service:    • FRACTURE SURGERY Left     left foot ligament repair   • LEEP      x2   • SKIN BIOPSY     • VAGINAL BIOPSY N/A 2019    Procedure: EXCISION OF VAGINAL LESION;  Surgeon: Jared Morgan MD;  Location: [facility];  Service: Obstetrics/Gynecology       Problem List:  Patient Active Problem List   Diagnosis   •  Lupus (HCC)   • Factor V Leiden (HCC)   • Hypocortisolism (HCC)   • Memory loss, short term       Allergy:   No Known Allergies     Current Medications:   Current Outpatient Medications   Medication Sig Dispense Refill   • cholecalciferol (VITAMIN D3) 400 units tablet Take 400 Units by mouth Daily.     • Cyanocobalamin (VITAMIN B-12) 5000 MCG sublingual tablet one tablet daily     • famotidine (PEPCID) 20 MG tablet      • levETIRAcetam (KEPPRA) 750 MG tablet Take 1 tablet by mouth 2 (Two) Times a Day for 30 days. 60 tablet 0   • loratadine (CLARITIN) 10 MG tablet      • mycophenolate (CELLCEPT) 200 MG/ML suspension Take 7.5 mL by mouth Every 12 (Twelve) Hours.     • Nplate 125 MCG injection INJECT 100 MCG SUBCUTANEOUSLY EVERY 2 WEEKS AS DIRECTED WHEN PLATELLETS LESS THAN 30,000     • predniSONE (DELTASONE) 5 MG tablet 1 tablet daily.  Sick day dosin tablets. 40 tablet 2   • Xarelto 2.5 MG tablet TAKE ONE TABLET BY MOUTH TWO TIMES A DAY FOR LUPUS       No current facility-administered medications for this visit.       Review of Symptoms:    Review of Systems   Constitutional: Negative for activity change, appetite change, chills, fatigue and fever.   HENT: Negative.    Eyes: Negative.    Respiratory: Negative.    Cardiovascular: Negative.    Gastrointestinal: Negative for diarrhea, nausea and vomiting.   Endocrine: Negative.    Genitourinary: Negative.    Musculoskeletal: Negative.    Skin: Negative.    Allergic/Immunologic: Negative.    Neurological: Positive for seizures.   Hematological:        Factor V    Psychiatric/Behavioral: Positive for stress. Negative for agitation, behavioral problems, decreased concentration, dysphoric mood, hallucinations, self-injury, sleep disturbance, suicidal ideas and negative for hyperactivity. The patient is not nervous/anxious (intermittent episodes).          Physical Exam:   not currently breastfeeding.     Appearance: CF of stated age in no acute distress  Gait, Station,  Strength: WNL    Mental Status Exam:     Mental Status exam performed 10/10/2022  and patient shows no significant changes from previous exam.     Hygiene:   good  Cooperation:  Cooperative  Eye Contact:  Good  Psychomotor Behavior:  Appropriate  Affect:  Full range  Mood: normal, stable   Hopelessness: Denies  Speech:  Normal  Thought Process:  Goal directed and Linear  Thought Content:  Normal  Suicidal:  None  Homicidal:  None  Hallucinations:  None  Delusion:  None  Memory:  Intact  Orientation:  Person, Place, Time and Situation  Reliability:  good  Insight:  Good  Judgement:  Good  Impulse Control:  Good  Physical/Medical Issues:  Yes Factor V Leiden, seizure disorder, and lupus       Lab Results:   No visits with results within 1 Month(s) from this visit.   Latest known visit with results is:   Admission on 07/25/2022, Discharged on 07/26/2022   Component Date Value Ref Range Status   • Glucose 07/25/2022 107 (H)  65 - 99 mg/dL Final   • BUN 07/25/2022 13  6 - 20 mg/dL Final   • Creatinine 07/25/2022 0.80  0.57 - 1.00 mg/dL Final   • Sodium 07/25/2022 140  136 - 145 mmol/L Final   • Potassium 07/25/2022 4.1  3.5 - 5.2 mmol/L Final   • Chloride 07/25/2022 103  98 - 107 mmol/L Final   • CO2 07/25/2022 27.1  22.0 - 29.0 mmol/L Final   • Calcium 07/25/2022 10.0  8.6 - 10.5 mg/dL Final   • Total Protein 07/25/2022 7.5  6.0 - 8.5 g/dL Final   • Albumin 07/25/2022 5.20  3.50 - 5.20 g/dL Final   • ALT (SGPT) 07/25/2022 20  1 - 33 U/L Final   • AST (SGOT) 07/25/2022 18  1 - 32 U/L Final   • Alkaline Phosphatase 07/25/2022 55  39 - 117 U/L Final   • Total Bilirubin 07/25/2022 0.3  0.0 - 1.2 mg/dL Final   • Globulin 07/25/2022 2.3  gm/dL Final   • A/G Ratio 07/25/2022 2.3  g/dL Final   • BUN/Creatinine Ratio 07/25/2022 16.3  7.0 - 25.0 Final   • Anion Gap 07/25/2022 9.9  5.0 - 15.0 mmol/L Final   • eGFR 07/25/2022 92.7  >60.0 mL/min/1.73 Final    National Kidney Foundation and American Society of Nephrology (ASN) Task  Force recommended calculation based on the Chronic Kidney Disease Epidemiology Collaboration (CKD-EPI) equation refit without adjustment for race.   • Free T4 07/25/2022 1.05  0.93 - 1.70 ng/dL Final   • TSH 07/25/2022 2.270  0.270 - 4.200 uIU/mL Final   • Magnesium 07/25/2022 2.5  1.6 - 2.6 mg/dL Final   • Creatine Kinase 07/25/2022 76  20 - 180 U/L Final   • Salicylate 07/25/2022 <0.3  <=30.0 mg/dL Final   • THC, Screen, Urine 07/25/2022 Negative  Negative Final   • Phencyclidine (PCP), Urine 07/25/2022 Negative  Negative Final   • Cocaine Screen, Urine 07/25/2022 Negative  Negative Final   • Methamphetamine, Ur 07/25/2022 Negative  Negative Final   • Opiate Screen 07/25/2022 Negative  Negative Final   • Amphetamine Screen, Urine 07/25/2022 Negative  Negative Final   • Benzodiazepine Screen, Urine 07/25/2022 Negative  Negative Final   • Tricyclic Antidepressants Screen 07/25/2022 Negative  Negative Final   • Methadone Screen, Urine 07/25/2022 Negative  Negative Final   • Barbiturates Screen, Urine 07/25/2022 Negative  Negative Final   • Oxycodone Screen, Urine 07/25/2022 Negative  Negative Final   • Propoxyphene Screen 07/25/2022 Negative  Negative Final   • Buprenorphine, Screen, Urine 07/25/2022 Negative  Negative Final   • Ethanol 07/25/2022 <10  0 - 10 mg/dL Final   • Ethanol % 07/25/2022 <0.010  % Final   • Acetaminophen 07/25/2022 <5.0  0.0 - 30.0 mcg/mL Final   • C-Reactive Protein 07/25/2022 <0.30  0.00 - 0.50 mg/dL Final   • Sed Rate 07/25/2022 2  0 - 20 mm/hr Final   • Color, UA 07/25/2022 Yellow  Yellow, Straw Final   • Appearance, UA 07/25/2022 Clear  Clear Final   • pH, UA 07/25/2022 7.5  5.0 - 8.0 Final   • Specific Gravity, UA 07/25/2022 1.014  1.005 - 1.030 Final   • Glucose, UA 07/25/2022 Negative  Negative Final   • Ketones, UA 07/25/2022 Negative  Negative Final   • Bilirubin, UA 07/25/2022 Negative  Negative Final   • Blood, UA 07/25/2022 Negative  Negative Final   • Protein, UA 07/25/2022  Negative  Negative Final   • Leuk Esterase, UA 07/25/2022 Large (3+) (A)  Negative Final   • Nitrite, UA 07/25/2022 Negative  Negative Final   • Urobilinogen, UA 07/25/2022 1.0 E.U./dL  0.2 - 1.0 E.U./dL Final   • QT Interval 07/25/2022 352  ms Final   • QTC Interval 07/25/2022 396  ms Final   • HCG Qualitative 07/25/2022 Negative  Negative Final   • COVID19 07/25/2022 Not Detected  Not Detected - Ref. Range Final   • Influenza A PCR 07/25/2022 Not Detected  Not Detected Final   • Influenza B PCR 07/25/2022 Not Detected  Not Detected Final   • Lactate 07/25/2022 1.4  0.5 - 2.0 mmol/L Final   • WBC 07/25/2022 11.69 (H)  3.40 - 10.80 10*3/mm3 Final   • RBC 07/25/2022 4.83  3.77 - 5.28 10*6/mm3 Final   • Hemoglobin 07/25/2022 14.3  12.0 - 15.9 g/dL Final   • Hematocrit 07/25/2022 42.7  34.0 - 46.6 % Final   • MCV 07/25/2022 88.4  79.0 - 97.0 fL Final   • MCH 07/25/2022 29.6  26.6 - 33.0 pg Final   • MCHC 07/25/2022 33.5  31.5 - 35.7 g/dL Final   • RDW 07/25/2022 12.4  12.3 - 15.4 % Final   • RDW-SD 07/25/2022 39.9  37.0 - 54.0 fl Final   • MPV 07/25/2022 11.1  6.0 - 12.0 fL Final   • Platelets 07/25/2022 69 (L)  140 - 450 10*3/mm3 Final   • Neutrophil % 07/25/2022 85.1 (H)  42.7 - 76.0 % Final   • Lymphocyte % 07/25/2022 9.9 (L)  19.6 - 45.3 % Final   • Monocyte % 07/25/2022 4.5 (L)  5.0 - 12.0 % Final   • Eosinophil % 07/25/2022 0.0 (L)  0.3 - 6.2 % Final   • Basophil % 07/25/2022 0.2  0.0 - 1.5 % Final   • Immature Grans % 07/25/2022 0.3  0.0 - 0.5 % Final   • Neutrophils, Absolute 07/25/2022 9.94 (H)  1.70 - 7.00 10*3/mm3 Final   • Lymphocytes, Absolute 07/25/2022 1.16  0.70 - 3.10 10*3/mm3 Final   • Monocytes, Absolute 07/25/2022 0.53  0.10 - 0.90 10*3/mm3 Final   • Eosinophils, Absolute 07/25/2022 0.00  0.00 - 0.40 10*3/mm3 Final   • Basophils, Absolute 07/25/2022 0.02  0.00 - 0.20 10*3/mm3 Final   • Immature Grans, Absolute 07/25/2022 0.04  0.00 - 0.05 10*3/mm3 Final   • nRBC 07/25/2022 0.0  0.0 - 0.2 /100 WBC  Final   • RBC, UA 07/25/2022 0-2  None Seen, 0-2 /HPF Final   • WBC, UA 07/25/2022 6-12 (A)  None Seen, 0-2 /HPF Final   • Bacteria, UA 07/25/2022 1+ (A)  None Seen /HPF Final   • Squamous Epithelial Cells, UA 07/25/2022 3-6 (A)  None Seen, 0-2 /HPF Final   • Yeast, UA 07/25/2022 Small/1+ Budding Yeast  None Seen /HPF Final   • Hyaline Casts, UA 07/25/2022 None Seen  None Seen /LPF Final   • Methodology 07/25/2022 Manual Light Microscopy   Final       Assessment & Plan   Diagnoses and all orders for this visit:    1. Generalized anxiety disorder (Primary)  -     sertraline (Zoloft) 100 MG tablet; Take 1.5 tablets by mouth Daily.  Dispense: 45 tablet; Refill: 2  -     busPIRone (BUSPAR) 15 MG tablet; Take 1 tablet by mouth 3 (Three) Times a Day.  Dispense: 90 tablet; Refill: 2    2. PMDD (premenstrual dysphoric disorder)  -     sertraline (Zoloft) 100 MG tablet; Take 1.5 tablets by mouth Daily.  Dispense: 45 tablet; Refill: 2  -     busPIRone (BUSPAR) 15 MG tablet; Take 1 tablet by mouth 3 (Three) Times a Day.  Dispense: 90 tablet; Refill: 2    3. Substance induced mood disorder (HCC)  -     sertraline (Zoloft) 100 MG tablet; Take 1.5 tablets by mouth Daily.  Dispense: 45 tablet; Refill: 2    -Patient continues to do relatively well and is not having any major mood or anxiety symptoms.  She is having some worsening seizure symptoms that tend to coincide with her menstrual cycle.  -Reviewed previous documentation  -Reviewed most recent available labs  -REGINE reviewed and appropriate. Patient counseled on use of controlled substances.   -Continue Zoloft 150 mg p.o. daily for anxiety, PMDD, and mood disorder related to Keppra   -Continue BuSpar to 15 mg 3 times daily for anxiety  -Patient taking clonazepam 0.5 mg as needed around the time of her.  As they feel that her seizure disorder may be hormone related and this seems to have improved symptoms  -Encouraged to follow-up with neurology as scheduled  -Encouraged to  follow-up with primary care provider for other medical comorbidities including lupus and factor V Leiden deficiency   -Approximate appointment time 11 AM to 11:15 AM via video visit      Visit Diagnoses:    ICD-10-CM ICD-9-CM   1. Generalized anxiety disorder  F41.1 300.02   2. PMDD (premenstrual dysphoric disorder)  F32.81 625.4   3. Substance induced mood disorder (HCC)  F19.94 292.84       TREATMENT PLAN/GOALS: Continue supportive psychotherapy efforts and medications as indicated. Treatment and medication options discussed during today's visit. Patient ackowledged and verbally consented to continue with current treatment plan and was educated on the importance of compliance with treatment and follow-up appointments.    MEDICATION ISSUES:    Discussed medication options and treatment plan of prescribed medication as well as the risks, benefits, and side effects including potential falls, possible impaired driving and metabolic adversities among others. Patient is agreeable to call the office with any worsening of symptoms or onset of side effects. Patient is agreeable to call 911 or go to the nearest ER should he/she begin having SI/HI. No medication side effects or related complaints today.     MEDS ORDERED DURING VISIT:  New Medications Ordered This Visit   Medications   • sertraline (Zoloft) 100 MG tablet     Sig: Take 1.5 tablets by mouth Daily.     Dispense:  45 tablet     Refill:  2   • busPIRone (BUSPAR) 15 MG tablet     Sig: Take 1 tablet by mouth 3 (Three) Times a Day.     Dispense:  90 tablet     Refill:  2       Return in about 3 months (around 1/10/2023).             This document has been electronically signed by Nithin Tyler MD  October 10, 2022 11:02 EDT

## 2022-10-25 ENCOUNTER — HOSPITAL ENCOUNTER (OUTPATIENT)
Dept: MAMMOGRAPHY | Facility: HOSPITAL | Age: 47
Discharge: HOME | End: 2022-10-25
Admitting: OBSTETRICS & GYNECOLOGY

## 2022-10-25 DIAGNOSIS — Z12.31 VISIT FOR SCREENING MAMMOGRAM: ICD-10-CM

## 2022-10-25 PROCEDURE — 77067 SCR MAMMO BI INCL CAD: CPT | Performed by: RADIOLOGY

## 2022-10-25 PROCEDURE — 77063 BREAST TOMOSYNTHESIS BI: CPT

## 2022-10-25 PROCEDURE — 77067 SCR MAMMO BI INCL CAD: CPT

## 2022-10-25 PROCEDURE — 77063 BREAST TOMOSYNTHESIS BI: CPT | Performed by: RADIOLOGY

## 2022-12-05 ENCOUNTER — LAB (OUTPATIENT)
Dept: LAB | Facility: HOSPITAL | Age: 47
End: 2022-12-05

## 2022-12-05 ENCOUNTER — OFFICE VISIT (OUTPATIENT)
Dept: ENDOCRINOLOGY | Facility: CLINIC | Age: 47
End: 2022-12-05

## 2022-12-05 VITALS
BODY MASS INDEX: 21.31 KG/M2 | DIASTOLIC BLOOD PRESSURE: 68 MMHG | HEIGHT: 67 IN | HEART RATE: 59 BPM | WEIGHT: 135.8 LBS | SYSTOLIC BLOOD PRESSURE: 126 MMHG | OXYGEN SATURATION: 100 %

## 2022-12-05 DIAGNOSIS — E27.49 HYPOCORTISOLISM: Primary | ICD-10-CM

## 2022-12-05 PROCEDURE — 80053 COMPREHEN METABOLIC PANEL: CPT | Performed by: NURSE PRACTITIONER

## 2022-12-05 PROCEDURE — 36415 COLL VENOUS BLD VENIPUNCTURE: CPT | Performed by: NURSE PRACTITIONER

## 2022-12-05 PROCEDURE — 99213 OFFICE O/P EST LOW 20 MIN: CPT | Performed by: NURSE PRACTITIONER

## 2022-12-05 NOTE — ASSESSMENT & PLAN NOTE
-Patient appears asymptomatic and denies any symptoms in office today.  -Will obtain labs today to monitor status.  Her BP and weight and overall well being appear stable.  -Continue prednisone 5 mg daily.  Discussed with patient that she may need sick day dosing.  She would double the dose of prednisone on sick days.  -Follow-up in 6 months.

## 2022-12-05 NOTE — PROGRESS NOTES
Chief Complaint   Patient presents with   • Hypocortisolism     F/u        Referring Provider  No ref. provider found     HPI   Emily Zavala is a 47 y.o. female had concerns including Hypocortisolism (F/u).   Hypocortisolism.     She has a history of absent seizures.  In [birth year] on her birthday she had a grand mal seizure.  She not is taking seizure medication.  She has some mild absent seizures continue but no major issues with this. They are unable to determine if this is from secondary hypoglycemia in relation to hypocortisolism or from a neuro issue.    She denies any new symptoms.  Does not have any issues today.    Cortisol History:  Her symptoms started about 2 to 3 years ago.  She initially had increased weight loss, decreased appetite, muscle weakness, extreme fatigue, hypotension, bradycardia, lightheadedness, salt cravings, hypoglycemia, nausea, diarrhea, abdominal pain, joint/muscle pain, irritability, depression, some mild body hair loss and some mild sexual dysfunction.  She has had intensive testing that determined that she has hypocortisolism.  She was started on 5 mg prednisone daily to which she has noticed a decrease in all of her symptoms.  Her main concern at this time is the fact that she has seizures monthly and is concerned that this may be associated with this.  She has been evaluated by neurology at .    Past Medical History:   Diagnosis Date   • Bulging lumbar disc    • Cancer (HCC)     Skiin Cancer - vagina   • Chronic ITP (idiopathic thrombocytopenia) (HCC)    • Clotting disorder (HCC)    • Elevated cholesterol    • Factor 5 Leiden mutation, heterozygous (HCC)    • Heart murmur    • History of transfusion     FFP   • Hypertension     ocassionally   • Lupus (HCC)    • Pulmonary emboli (HCC)    • Seizures (HCC)      Past Surgical History:   Procedure Laterality Date   • ABDOMINAL SURGERY     •  SECTION      x2   • D & C HYSTEROSCOPY ENDOMETRIAL ABLATION N/A 2017    Procedure:  DILATATION AND CURETTAGE, HYSTEROSCOPY, ABLATION(PT.GETTING PLATELETS DAY BEFORE SURGERY) OUT PT.SURGERY TO CHECK PLATELETS DAY OF SURGERY;  Surgeon: Gail White DO;  Location: [facility] OR;  Service:    • FRACTURE SURGERY Left     left foot ligament repair   • LEEP      x2   • SKIN BIOPSY     • VAGINAL BIOPSY N/A 2019    Procedure: EXCISION OF VAGINAL LESION;  Surgeon: Jared Morgan MD;  Location: [facility] OR;  Service: Obstetrics/Gynecology      Family History   Problem Relation Age of Onset   • Breast cancer Maternal Aunt       Social History     Socioeconomic History   • Marital status:    Tobacco Use   • Smoking status: Never   • Smokeless tobacco: Never   Vaping Use   • Vaping Use: Never used   Substance and Sexual Activity   • Alcohol use: No   • Drug use: No   • Sexual activity: Defer      No Known Allergies   Current Outpatient Medications on File Prior to Visit   Medication Sig Dispense Refill   • busPIRone (BUSPAR) 15 MG tablet Take 1 tablet by mouth 3 (Three) Times a Day. 90 tablet 2   • cholecalciferol (VITAMIN D3) 400 units tablet Take 400 Units by mouth Daily.     • Cyanocobalamin (VITAMIN B-12) 5000 MCG sublingual tablet one tablet daily     • famotidine (PEPCID) 20 MG tablet      • loratadine (CLARITIN) 10 MG tablet      • mycophenolate (CELLCEPT) 200 MG/ML suspension Take 7.5 mL by mouth Every 12 (Twelve) Hours.     • Nplate 125 MCG injection INJECT 100 MCG SUBCUTANEOUSLY EVERY 2 WEEKS AS DIRECTED WHEN PLATELLETS LESS THAN 30,000     • predniSONE (DELTASONE) 5 MG tablet 1 tablet daily.  Sick day dosin tablets. 40 tablet 2   • sertraline (Zoloft) 100 MG tablet Take 1.5 tablets by mouth Daily. 45 tablet 2   • levETIRAcetam (KEPPRA) 750 MG tablet Take 1 tablet by mouth 2 (Two) Times a Day for 30 days. 60 tablet 0   • [DISCONTINUED] lamoTRIgine (LaMICtal) 100 MG tablet Take 1 tablet by mouth 2 (Two) Times a Day for 30 days. 60 tablet 0   • [DISCONTINUED] Vimpat 150 MG  "tablet Take 1 tablet by mouth 2 (Two) Times a Day. 12 tablet 0   • [DISCONTINUED] Xarelto 2.5 MG tablet TAKE ONE TABLET BY MOUTH TWO TIMES A DAY FOR LUPUS       No current facility-administered medications on file prior to visit.        The following portions of the patient's history were reviewed and updated as appropriate: allergies, current medications, past family history, past medical history, past social history, past surgical history and problem list.    Review of Systems   Constitutional: Positive for fatigue and unexpected weight loss.   Eyes: Negative.    Gastrointestinal: Positive for abdominal pain, diarrhea and nausea.   Neurological: Positive for seizures, light-headedness and memory problem.   All other systems reviewed and are negative.    /68 (BP Location: Left arm, Patient Position: Sitting, Cuff Size: Adult)   Pulse 59   Ht 170.2 cm (67\")   Wt 61.6 kg (135 lb 12.8 oz)   SpO2 100%   BMI 21.27 kg/m²      Physical Exam  Vitals reviewed.   Constitutional:       Appearance: Normal appearance.   Eyes:      Extraocular Movements: Extraocular movements intact.   Cardiovascular:      Rate and Rhythm: Normal rate.      Pulses: Normal pulses.   Pulmonary:      Effort: Pulmonary effort is normal.   Musculoskeletal:      Right lower leg: No edema.      Left lower leg: No edema.   Neurological:      Mental Status: She is alert and oriented to person, place, and time.   Psychiatric:         Mood and Affect: Mood normal.         Behavior: Behavior normal.         Thought Content: Thought content normal.         Judgment: Judgment normal.       CMP:  Lab Results   Component Value Date    BUN 13 07/25/2022    CREATININE 0.80 07/25/2022    EGFRIFNONA 65 09/19/2019    BCR 16.3 07/25/2022     07/25/2022    K 4.1 07/25/2022    CO2 27.1 07/25/2022    CALCIUM 10.0 07/25/2022    ALBUMIN 5.20 07/25/2022    LABIL2 1.1 (L) 02/14/2015    BILITOT 0.3 07/25/2022    ALKPHOS 55 07/25/2022    AST 18 07/25/2022    " ALT 20 07/25/2022     Lipid Panel:  No results found for: CHOL, TRIG, HDL, VLDL, LDL  HbA1c:     Glucose:  No results found for: POCGLU  TSH:  Lab Results   Component Value Date    TSH 2.270 07/25/2022       Assessment and Plan    Diagnoses and all orders for this visit:    1. Hypocortisolism (HCC) (Primary)  Assessment & Plan:  -Patient appears asymptomatic and denies any symptoms in office today.  -Will obtain labs today to monitor status.  Her BP and weight and overall well being appear stable.  -Continue prednisone 5 mg daily.  Discussed with patient that she may need sick day dosing.  She would double the dose of prednisone on sick days.  -Follow-up in 6 months.    Orders:  -     Comprehensive Metabolic Panel       Return in about 6 months (around 6/5/2023) for Follow-up appointment. The patient was instructed to contact the clinic with any interval questions or concerns.    This document has been electronically signed by RUSLAN Charles  December 5, 2022 14:11 EST  Endocrinology

## 2022-12-06 LAB
ALBUMIN SERPL-MCNC: 4.3 G/DL (ref 3.5–5.2)
ALBUMIN/GLOB SERPL: 2 G/DL
ALP SERPL-CCNC: 51 U/L (ref 39–117)
ALT SERPL W P-5'-P-CCNC: 17 U/L (ref 1–33)
ANION GAP SERPL CALCULATED.3IONS-SCNC: 11 MMOL/L (ref 5–15)
AST SERPL-CCNC: 17 U/L (ref 1–32)
BILIRUB SERPL-MCNC: 0.3 MG/DL (ref 0–1.2)
BUN SERPL-MCNC: 12 MG/DL (ref 6–20)
BUN/CREAT SERPL: 13.2 (ref 7–25)
CALCIUM SPEC-SCNC: 9.2 MG/DL (ref 8.6–10.5)
CHLORIDE SERPL-SCNC: 105 MMOL/L (ref 98–107)
CO2 SERPL-SCNC: 25 MMOL/L (ref 22–29)
CREAT SERPL-MCNC: 0.91 MG/DL (ref 0.57–1)
EGFRCR SERPLBLD CKD-EPI 2021: 79.4 ML/MIN/1.73
GLOBULIN UR ELPH-MCNC: 2.1 GM/DL
GLUCOSE SERPL-MCNC: 86 MG/DL (ref 65–99)
POTASSIUM SERPL-SCNC: 3.9 MMOL/L (ref 3.5–5.2)
PROT SERPL-MCNC: 6.4 G/DL (ref 6–8.5)
SODIUM SERPL-SCNC: 141 MMOL/L (ref 136–145)

## 2023-01-10 DIAGNOSIS — F32.81 PMDD (PREMENSTRUAL DYSPHORIC DISORDER): ICD-10-CM

## 2023-01-10 DIAGNOSIS — F41.1 GENERALIZED ANXIETY DISORDER: ICD-10-CM

## 2023-01-10 DIAGNOSIS — F19.94 SUBSTANCE INDUCED MOOD DISORDER: ICD-10-CM

## 2023-01-10 RX ORDER — SERTRALINE HYDROCHLORIDE 100 MG/1
150 TABLET, FILM COATED ORAL DAILY
Qty: 45 TABLET | Refills: 2 | Status: SHIPPED | OUTPATIENT
Start: 2023-01-10 | End: 2023-03-10

## 2023-01-10 RX ORDER — BUSPIRONE HYDROCHLORIDE 15 MG/1
15 TABLET ORAL 3 TIMES DAILY
Qty: 90 TABLET | Refills: 2 | Status: SHIPPED | OUTPATIENT
Start: 2023-01-10 | End: 2023-04-04 | Stop reason: SDUPTHER

## 2023-03-08 DIAGNOSIS — F41.1 GENERALIZED ANXIETY DISORDER: ICD-10-CM

## 2023-03-08 DIAGNOSIS — F32.81 PMDD (PREMENSTRUAL DYSPHORIC DISORDER): ICD-10-CM

## 2023-03-08 DIAGNOSIS — F19.94 SUBSTANCE INDUCED MOOD DISORDER: ICD-10-CM

## 2023-03-10 RX ORDER — SERTRALINE HYDROCHLORIDE 100 MG/1
TABLET, FILM COATED ORAL
Qty: 45 TABLET | Refills: 2 | Status: SHIPPED | OUTPATIENT
Start: 2023-03-10

## 2023-04-04 ENCOUNTER — OFFICE VISIT (OUTPATIENT)
Dept: PSYCHIATRY | Facility: CLINIC | Age: 48
End: 2023-04-04
Payer: COMMERCIAL

## 2023-04-04 VITALS
HEIGHT: 67 IN | HEART RATE: 66 BPM | WEIGHT: 133.6 LBS | DIASTOLIC BLOOD PRESSURE: 60 MMHG | SYSTOLIC BLOOD PRESSURE: 114 MMHG | BODY MASS INDEX: 20.97 KG/M2

## 2023-04-04 DIAGNOSIS — F41.1 GENERALIZED ANXIETY DISORDER: ICD-10-CM

## 2023-04-04 DIAGNOSIS — F32.81 PMDD (PREMENSTRUAL DYSPHORIC DISORDER): ICD-10-CM

## 2023-04-04 PROCEDURE — 99213 OFFICE O/P EST LOW 20 MIN: CPT | Performed by: PSYCHIATRY & NEUROLOGY

## 2023-04-04 PROCEDURE — 1160F RVW MEDS BY RX/DR IN RCRD: CPT | Performed by: PSYCHIATRY & NEUROLOGY

## 2023-04-04 PROCEDURE — 1159F MED LIST DOCD IN RCRD: CPT | Performed by: PSYCHIATRY & NEUROLOGY

## 2023-04-04 RX ORDER — BUSPIRONE HYDROCHLORIDE 15 MG/1
15 TABLET ORAL 3 TIMES DAILY
Qty: 90 TABLET | Refills: 2 | Status: SHIPPED | OUTPATIENT
Start: 2023-04-04

## 2023-04-04 RX ORDER — MONTELUKAST SODIUM 10 MG/1
10 TABLET ORAL
COMMUNITY
Start: 2023-03-16

## 2023-04-04 RX ORDER — CLONAZEPAM 0.5 MG/1
TABLET ORAL
COMMUNITY
Start: 2022-11-15

## 2023-04-04 NOTE — PROGRESS NOTES
Subjective   Emily Zavala is a 47 y.o. female who presents today for follow up    Chief Complaint: Anxiety      History of Present Illness: Patient presented today for follow-up.  Since last visit, she reports that she has been stable from mood and anxiety standpoint.  She does endorse that she does have some anxiety when she is having absence seizures which have continued and seem to be worse and happen primarily the week after her period so she feels it may be related to hormones.  They have been 2-3 times daily for about 1 week after she has her menstrual cycle and she has follow-up with her OB/GYN and neurologist about this.  She is hoping it is related to hormones and perimenopausal symptoms and if so, she may have a hysterectomy.  She is not having any medication side effects.  Sleep and appetite are appropriate.  She denies SI/HI/AVH.  She reports that she is going to take her son to the park to play basketball while she walks due to the weather being nice.      The following portions of the patient's history were reviewed and updated as appropriate: allergies, current medications, past family history, past medical history, past social history, past surgical history and problem list.      Past Medical History:  Past Medical History:   Diagnosis Date   • Bulging lumbar disc    • Cancer     Skiin Cancer - vagina   • Chronic ITP (idiopathic thrombocytopenia)    • Clotting disorder    • Elevated cholesterol    • Factor 5 Leiden mutation, heterozygous    • Heart murmur    • History of transfusion     FFP   • Hypertension     ocassionally   • Lupus    • Pulmonary emboli    • Seizures        Social History:  Social History     Socioeconomic History   • Marital status:    Tobacco Use   • Smoking status: Never   • Smokeless tobacco: Never   Vaping Use   • Vaping Use: Never used   Substance and Sexual Activity   • Alcohol use: No   • Drug use: No   • Sexual activity: Defer   She is  and not currently  in a relationship.  She has 2 sons at home, 17 and 11 years old.  She completed the eighth grade but dropped out of high school after that.  She currently supports herself with disability due to physical ailments.  She denies any substance use including alcohol, tobacco, and illicit substances.    Family History:  Family History   Problem Relation Age of Onset   • Breast cancer Maternal Aunt    Patient has a sister with bipolar schizophrenia.    Past Surgical History:  Past Surgical History:   Procedure Laterality Date   • ABDOMINAL SURGERY     •  SECTION      x2   • D & C HYSTEROSCOPY ENDOMETRIAL ABLATION N/A 2017    Procedure: DILATATION AND CURETTAGE, HYSTEROSCOPY, ABLATION(PT.GETTING PLATELETS DAY BEFORE SURGERY) OUT PT.SURGERY TO CHECK PLATELETS DAY OF SURGERY;  Surgeon: Gail White DO;  Location: [facility];  Service:    • FRACTURE SURGERY Left     left foot ligament repair   • LEEP      x2   • SKIN BIOPSY     • VAGINAL BIOPSY N/A 2019    Procedure: EXCISION OF VAGINAL LESION;  Surgeon: Jared Morgan MD;  Location: [facility] OR;  Service: Obstetrics/Gynecology       Problem List:  Patient Active Problem List   Diagnosis   • Lupus   • Factor V Leiden   • Hypocortisolism   • Memory loss, short term       Allergy:   No Known Allergies     Current Medications:   Current Outpatient Medications   Medication Sig Dispense Refill   • busPIRone (BUSPAR) 15 MG tablet Take 1 tablet by mouth 3 (Three) Times a Day. 90 tablet 2   • cholecalciferol (VITAMIN D3) 400 units tablet Take 1 tablet by mouth Daily.     • clonazePAM (KlonoPIN) 0.5 MG tablet      • Cyanocobalamin (VITAMIN B-12) 5000 MCG sublingual tablet one tablet daily     • famotidine (PEPCID) 20 MG tablet      • loratadine (CLARITIN) 10 MG tablet      • montelukast (SINGULAIR) 10 MG tablet Take 1 tablet by mouth every night at bedtime.     • mycophenolate (CELLCEPT) 200 MG/ML suspension Take 7.5 mL by mouth Every 12 (Twelve) Hours.    "  • Nplate 125 MCG injection INJECT 100 MCG SUBCUTANEOUSLY EVERY 2 WEEKS AS DIRECTED WHEN PLATELLETS LESS THAN 30,000     • predniSONE (DELTASONE) 5 MG tablet 1 tablet daily.  Sick day dosin tablets. 40 tablet 2   • sertraline (ZOLOFT) 100 MG tablet TAKE 1 AND 1/2 TABLETS BY MOUTH EVERY DAY 45 tablet 2   • levETIRAcetam (KEPPRA) 750 MG tablet Take 1 tablet by mouth 2 (Two) Times a Day for 30 days. 60 tablet 0     No current facility-administered medications for this visit.       Review of Symptoms:    Review of Systems   Constitutional: Negative for activity change, appetite change, chills, fatigue and fever.   HENT: Negative.    Eyes: Negative.    Respiratory: Negative.    Cardiovascular: Negative.    Gastrointestinal: Negative for diarrhea, nausea and vomiting.   Endocrine: Negative.    Genitourinary: Negative.    Musculoskeletal: Negative.    Skin: Negative.    Allergic/Immunologic: Negative.    Neurological: Positive for seizures.   Hematological:        Factor V    Psychiatric/Behavioral: Negative for agitation, behavioral problems, decreased concentration, dysphoric mood, hallucinations, self-injury, sleep disturbance, suicidal ideas, negative for hyperactivity and stress. The patient is not nervous/anxious (intermittent episodes).          Physical Exam:   Blood pressure 114/60, pulse 66, height 170.2 cm (67.01\"), weight 60.6 kg (133 lb 9.6 oz), not currently breastfeeding.     Appearance: CF of stated age in no acute distress  Gait, Station, Strength: WNL    Mental Status Exam:     Mental Status exam performed 2023  and patient shows no significant changes from previous exam.     Hygiene:   good  Cooperation:  Cooperative  Eye Contact:  Good  Psychomotor Behavior:  Appropriate  Affect:  Full range  Mood: normal, stable   Hopelessness: Denies  Speech:  Normal  Thought Process:  Goal directed and Linear  Thought Content:  Normal  Suicidal:  None  Homicidal:  None  Hallucinations:  None  Delusion:  " None  Memory:  Intact  Orientation:  Person, Place, Time and Situation  Reliability:  good  Insight:  Good  Judgement:  Good  Impulse Control:  Good  Physical/Medical Issues:  Yes Factor V Leiden, seizure disorder, and lupus       Lab Results:   No visits with results within 1 Month(s) from this visit.   Latest known visit with results is:   Office Visit on 12/05/2022   Component Date Value Ref Range Status   • Glucose 12/05/2022 86  65 - 99 mg/dL Final   • BUN 12/05/2022 12  6 - 20 mg/dL Final   • Creatinine 12/05/2022 0.91  0.57 - 1.00 mg/dL Final   • Sodium 12/05/2022 141  136 - 145 mmol/L Final   • Potassium 12/05/2022 3.9  3.5 - 5.2 mmol/L Final   • Chloride 12/05/2022 105  98 - 107 mmol/L Final   • CO2 12/05/2022 25.0  22.0 - 29.0 mmol/L Final   • Calcium 12/05/2022 9.2  8.6 - 10.5 mg/dL Final   • Total Protein 12/05/2022 6.4  6.0 - 8.5 g/dL Final   • Albumin 12/05/2022 4.30  3.50 - 5.20 g/dL Final   • ALT (SGPT) 12/05/2022 17  1 - 33 U/L Final   • AST (SGOT) 12/05/2022 17  1 - 32 U/L Final   • Alkaline Phosphatase 12/05/2022 51  39 - 117 U/L Final   • Total Bilirubin 12/05/2022 0.3  0.0 - 1.2 mg/dL Final   • Globulin 12/05/2022 2.1  gm/dL Final   • A/G Ratio 12/05/2022 2.0  g/dL Final   • BUN/Creatinine Ratio 12/05/2022 13.2  7.0 - 25.0 Final   • Anion Gap 12/05/2022 11.0  5.0 - 15.0 mmol/L Final   • eGFR 12/05/2022 79.4  >60.0 mL/min/1.73 Final    National Kidney Foundation and American Society of Nephrology (ASN) Task Force recommended calculation based on the Chronic Kidney Disease Epidemiology Collaboration (CKD-EPI) equation refit without adjustment for race.       Assessment & Plan   Diagnoses and all orders for this visit:    1. Generalized anxiety disorder  -     busPIRone (BUSPAR) 15 MG tablet; Take 1 tablet by mouth 3 (Three) Times a Day.  Dispense: 90 tablet; Refill: 2    2. PMDD (premenstrual dysphoric disorder)  -     busPIRone (BUSPAR) 15 MG tablet; Take 1 tablet by mouth 3 (Three) Times a Day.   Dispense: 90 tablet; Refill: 2    -Patient stable based on no major issues.  She is having continued absence seizures around the time of her menstrual cycle but has follow-up with neurology and her OB/GYN.  From a mental health standpoint, she is doing well aside from some anxiety about her seizure situation.  -Reviewed previous documentation  -Reviewed most recent available labs  -Continue Zoloft 150 mg p.o. daily for anxiety, PMDD, and mood disorder related to Keppra   -Continue BuSpar to 15 mg 3 times daily for anxiety  -Encouraged to follow-up with neurology as scheduled  -Encouraged to follow-up with primary care provider for other medical comorbidities including lupus and factor V Leiden deficiency         Visit Diagnoses:    ICD-10-CM ICD-9-CM   1. Generalized anxiety disorder  F41.1 300.02   2. PMDD (premenstrual dysphoric disorder)  F32.81 625.4       TREATMENT PLAN/GOALS: Continue supportive psychotherapy efforts and medications as indicated. Treatment and medication options discussed during today's visit. Patient ackowledged and verbally consented to continue with current treatment plan and was educated on the importance of compliance with treatment and follow-up appointments.    MEDICATION ISSUES:    Discussed medication options and treatment plan of prescribed medication as well as the risks, benefits, and side effects including potential falls, possible impaired driving and metabolic adversities among others. Patient is agreeable to call the office with any worsening of symptoms or onset of side effects. Patient is agreeable to call 911 or go to the nearest ER should he/she begin having SI/HI. No medication side effects or related complaints today.     MEDS ORDERED DURING VISIT:  New Medications Ordered This Visit   Medications   • busPIRone (BUSPAR) 15 MG tablet     Sig: Take 1 tablet by mouth 3 (Three) Times a Day.     Dispense:  90 tablet     Refill:  2       Return in about 3 months (around  7/4/2023).             This document has been electronically signed by Nithin Tyler MD  April 4, 2023 13:26 EDT

## 2023-05-31 DIAGNOSIS — F41.1 GENERALIZED ANXIETY DISORDER: ICD-10-CM

## 2023-05-31 DIAGNOSIS — F32.81 PMDD (PREMENSTRUAL DYSPHORIC DISORDER): ICD-10-CM

## 2023-05-31 DIAGNOSIS — F19.94 SUBSTANCE INDUCED MOOD DISORDER: ICD-10-CM

## 2023-06-01 RX ORDER — SERTRALINE HYDROCHLORIDE 100 MG/1
TABLET, FILM COATED ORAL
Qty: 45 TABLET | Refills: 2 | Status: SHIPPED | OUTPATIENT
Start: 2023-06-01

## 2023-06-06 ENCOUNTER — OFFICE VISIT (OUTPATIENT)
Dept: ENDOCRINOLOGY | Facility: CLINIC | Age: 48
End: 2023-06-06
Payer: COMMERCIAL

## 2023-06-06 VITALS
HEIGHT: 65 IN | HEART RATE: 66 BPM | DIASTOLIC BLOOD PRESSURE: 70 MMHG | OXYGEN SATURATION: 99 % | SYSTOLIC BLOOD PRESSURE: 124 MMHG | WEIGHT: 131 LBS | BODY MASS INDEX: 21.83 KG/M2

## 2023-06-06 DIAGNOSIS — E27.49 HYPOCORTISOLISM: ICD-10-CM

## 2023-06-06 DIAGNOSIS — R53.83 FATIGUE, UNSPECIFIED TYPE: Primary | ICD-10-CM

## 2023-06-06 PROCEDURE — 86376 MICROSOMAL ANTIBODY EACH: CPT | Performed by: NURSE PRACTITIONER

## 2023-06-06 PROCEDURE — 84443 ASSAY THYROID STIM HORMONE: CPT | Performed by: NURSE PRACTITIONER

## 2023-06-06 PROCEDURE — 86800 THYROGLOBULIN ANTIBODY: CPT | Performed by: NURSE PRACTITIONER

## 2023-06-06 PROCEDURE — 84439 ASSAY OF FREE THYROXINE: CPT | Performed by: NURSE PRACTITIONER

## 2023-06-06 NOTE — PROGRESS NOTES
Chief Complaint   Patient presents with    Hypocortisolism        Referring Provider  No ref. provider found     HPI   Emily Zavala is a 47 y.o. female had concerns including Hypocortisolism.   Hypocortisolism.     She has a history of absent seizures.  Since her last visit, she has continued to have seizures but has only been absent seizures. She is currently taking seizure medication at this time.    She is having increased symptoms that she thinks may be related to her thyroid and would like to have this tested as well.  She denies any new symptoms related to her cortisol levels.      Cortisol History:  Her symptoms started about 2 to 3 years ago.  She initially had increased weight loss, decreased appetite, muscle weakness, extreme fatigue, hypotension, bradycardia, lightheadedness, salt cravings, hypoglycemia, nausea, diarrhea, abdominal pain, joint/muscle pain, irritability, depression, some mild body hair loss and some mild sexual dysfunction.  She has had intensive testing that determined that she has hypocortisolism.  She was started on 5 mg prednisone daily to which she has noticed a decrease in all of her symptoms.  Her main concern at this time is the fact that she has seizures monthly and is concerned that this may be associated with this.  She has been evaluated by neurology at .    Past Medical History:   Diagnosis Date    Bulging lumbar disc     Cancer     Skiin Cancer - vagina    Chronic ITP (idiopathic thrombocytopenia)     Clotting disorder     Elevated cholesterol     Factor 5 Leiden mutation, heterozygous     Heart murmur     History of transfusion     FFP    Hypertension     ocassionally    Lupus     Pulmonary emboli     Seizures      Past Surgical History:   Procedure Laterality Date    ABDOMINAL SURGERY       SECTION      x2    D & C HYSTEROSCOPY ENDOMETRIAL ABLATION N/A 2017    Procedure: DILATATION AND CURETTAGE, HYSTEROSCOPY, ABLATION(PT.GETTING PLATELETS DAY BEFORE SURGERY)  OUT PT.SURGERY TO CHECK PLATELETS DAY OF SURGERY;  Surgeon: Gail White DO;  Location:  [facility] OR;  Service:     FRACTURE SURGERY Left     left foot ligament repair    LEEP      x2    SKIN BIOPSY      VAGINAL BIOPSY N/A 2019    Procedure: EXCISION OF VAGINAL LESION;  Surgeon: Jared Morgan MD;  Location:  [facility] OR;  Service: Obstetrics/Gynecology      Family History   Problem Relation Age of Onset    Breast cancer Maternal Aunt       Social History     Socioeconomic History    Marital status:    Tobacco Use    Smoking status: Never    Smokeless tobacco: Never   Vaping Use    Vaping Use: Never used   Substance and Sexual Activity    Alcohol use: No    Drug use: No    Sexual activity: Defer      No Known Allergies   Current Outpatient Medications on File Prior to Visit   Medication Sig Dispense Refill    busPIRone (BUSPAR) 15 MG tablet Take 1 tablet by mouth 3 (Three) Times a Day. 90 tablet 2    cholecalciferol (VITAMIN D3) 400 units tablet Take 1 tablet by mouth Daily.      clonazePAM (KlonoPIN) 0.5 MG tablet       Cyanocobalamin (VITAMIN B-12) 5000 MCG sublingual tablet one tablet daily      famotidine (PEPCID) 20 MG tablet       loratadine (CLARITIN) 10 MG tablet       montelukast (SINGULAIR) 10 MG tablet Take 1 tablet by mouth every night at bedtime.      mycophenolate (CELLCEPT) 200 MG/ML suspension Take 7.5 mL by mouth Every 12 (Twelve) Hours.      Nplate 125 MCG injection INJECT 100 MCG SUBCUTANEOUSLY EVERY 2 WEEKS AS DIRECTED WHEN PLATELLETS LESS THAN 30,000      predniSONE (DELTASONE) 5 MG tablet 1 tablet daily.  Sick day dosin tablets. 40 tablet 2    sertraline (ZOLOFT) 100 MG tablet TAKE 1 AND 1/2 TABLETS BY MOUTH EVERY DAY 45 tablet 2    levETIRAcetam (KEPPRA) 750 MG tablet Take 1 tablet by mouth 2 (Two) Times a Day for 30 days. 60 tablet 0    [DISCONTINUED] lamoTRIgine (LaMICtal) 100 MG tablet Take 1 tablet by mouth 2 (Two) Times a Day for 30 days. 60 tablet 0     "[DISCONTINUED] Vimpat 150 MG tablet Take 1 tablet by mouth 2 (Two) Times a Day. 12 tablet 0     No current facility-administered medications on file prior to visit.        The following portions of the patient's history were reviewed and updated as appropriate: allergies, current medications, past family history, past medical history, past social history, past surgical history and problem list.    Review of Systems   Constitutional:  Positive for fatigue and unexpected weight loss.   Eyes: Negative.    Gastrointestinal:  Positive for abdominal pain, diarrhea and nausea.   Neurological:  Positive for seizures, light-headedness and memory problem.   All other systems reviewed and are negative.    /70 (BP Location: Left arm, Patient Position: Sitting, Cuff Size: Adult)   Pulse 66   Ht 165.1 cm (65\")   Wt 59.4 kg (131 lb)   SpO2 99%   BMI 21.80 kg/m²      Physical Exam  Vitals reviewed.   Constitutional:       Appearance: Normal appearance.   Eyes:      Extraocular Movements: Extraocular movements intact.   Neck:      Thyroid: Thyromegaly present. No thyroid mass or thyroid tenderness.   Cardiovascular:      Rate and Rhythm: Normal rate.   Pulmonary:      Effort: Pulmonary effort is normal.   Neurological:      General: No focal deficit present.      Mental Status: She is alert and oriented to person, place, and time.   Psychiatric:         Mood and Affect: Mood normal.         Behavior: Behavior normal.         Thought Content: Thought content normal.         Judgment: Judgment normal.     CMP:  Lab Results   Component Value Date    BUN 12 12/05/2022    CREATININE 0.91 12/05/2022    EGFRIFNONA 65 09/19/2019    BCR 13.2 12/05/2022     12/05/2022    K 3.9 12/05/2022    CO2 25.0 12/05/2022    CALCIUM 9.2 12/05/2022    ALBUMIN 4.30 12/05/2022    LABIL2 1.1 (L) 02/14/2015    BILITOT 0.3 12/05/2022    ALKPHOS 51 12/05/2022    AST 17 12/05/2022    ALT 17 12/05/2022     Lipid Panel:  No results found for: " CHOL, TRIG, HDL, VLDL, LDL  HbA1c:     Glucose:  No results found for: POCGLU  TSH:  Lab Results   Component Value Date    TSH 2.270 07/25/2022       Assessment and Plan    Diagnoses and all orders for this visit:    1. Fatigue, unspecified type (Primary)  Assessment & Plan:  Will obtain TFT's and US Thyroid.  Will treat and follow as indicated.    Orders:  -     TSH  -     T4, Free  -     Thyroid Antibodies    2. Hypocortisolism  Assessment & Plan:  -Patient appears asymptomatic and denies any symptoms in office today.  -Most recent labs showed electrolyte levels in range. Her BP and weight and overall well being appear stable.  -Continue prednisone 5 mg daily.  Discussed with patient that she may need sick day dosing.  She would double the dose of prednisone on sick days.  -Follow-up in 6 months.           Return in about 6 months (around 12/6/2023) for Follow-up appointment. The patient was instructed to contact the clinic with any interval questions or concerns.    This document has been electronically signed by RUSLAN Charles  June 6, 2023 15:00 EDT  Endocrinology

## 2023-06-06 NOTE — ASSESSMENT & PLAN NOTE
-Patient appears asymptomatic and denies any symptoms in office today.  -Most recent labs showed electrolyte levels in range. Her BP and weight and overall well being appear stable.  -Continue prednisone 5 mg daily.  Discussed with patient that she may need sick day dosing.  She would double the dose of prednisone on sick days.  -Follow-up in 6 months.

## 2023-06-07 LAB
T4 FREE SERPL-MCNC: 1.09 NG/DL (ref 0.93–1.7)
THYROGLOB AB SERPL-ACNC: <1 IU/ML (ref 0–0.9)
THYROPEROXIDASE AB SERPL-ACNC: <9 IU/ML (ref 0–34)
TSH SERPL DL<=0.05 MIU/L-ACNC: 3.89 UIU/ML (ref 0.27–4.2)

## 2023-08-02 DIAGNOSIS — F41.1 GENERALIZED ANXIETY DISORDER: ICD-10-CM

## 2023-08-02 DIAGNOSIS — F32.81 PMDD (PREMENSTRUAL DYSPHORIC DISORDER): ICD-10-CM

## 2023-08-02 RX ORDER — BUSPIRONE HYDROCHLORIDE 15 MG/1
TABLET ORAL
Qty: 90 TABLET | Refills: 2 | Status: SHIPPED | OUTPATIENT
Start: 2023-08-02

## 2023-08-10 ENCOUNTER — LAB (OUTPATIENT)
Dept: LAB | Facility: HOSPITAL | Age: 48
End: 2023-08-10
Payer: COMMERCIAL

## 2023-08-10 ENCOUNTER — OFFICE VISIT (OUTPATIENT)
Dept: PSYCHIATRY | Facility: CLINIC | Age: 48
End: 2023-08-10
Payer: COMMERCIAL

## 2023-08-10 VITALS
HEIGHT: 65 IN | WEIGHT: 131.6 LBS | DIASTOLIC BLOOD PRESSURE: 70 MMHG | HEART RATE: 68 BPM | BODY MASS INDEX: 21.92 KG/M2 | SYSTOLIC BLOOD PRESSURE: 122 MMHG

## 2023-08-10 DIAGNOSIS — F32.81 PMDD (PREMENSTRUAL DYSPHORIC DISORDER): ICD-10-CM

## 2023-08-10 DIAGNOSIS — F19.94 SUBSTANCE INDUCED MOOD DISORDER: ICD-10-CM

## 2023-08-10 DIAGNOSIS — D69.59 THROMBOCYTOPENIA, ALLOIMMUNE: ICD-10-CM

## 2023-08-10 DIAGNOSIS — F41.1 GENERALIZED ANXIETY DISORDER: Primary | ICD-10-CM

## 2023-08-10 DIAGNOSIS — E53.8 LOW FOLATE: ICD-10-CM

## 2023-08-10 LAB
BURR CELLS BLD QL SMEAR: ABNORMAL
DEPRECATED RDW RBC AUTO: 40.5 FL (ref 37–54)
ERYTHROCYTE [DISTWIDTH] IN BLOOD BY AUTOMATED COUNT: 12.5 % (ref 12.3–15.4)
FOLATE SERPL-MCNC: >20 NG/ML (ref 4.78–24.2)
HCT VFR BLD AUTO: 39.4 % (ref 34–46.6)
HGB BLD-MCNC: 13.4 G/DL (ref 12–15.9)
LYMPHOCYTES # BLD MANUAL: 1.47 10*3/MM3 (ref 0.7–3.1)
LYMPHOCYTES NFR BLD MANUAL: 2.1 % (ref 5–12)
MCH RBC QN AUTO: 30.7 PG (ref 26.6–33)
MCHC RBC AUTO-ENTMCNC: 34 G/DL (ref 31.5–35.7)
MCV RBC AUTO: 90.2 FL (ref 79–97)
MONOCYTES # BLD: 0.09 10*3/MM3 (ref 0.1–0.9)
NEUTROPHILS # BLD AUTO: 2.88 10*3/MM3 (ref 1.7–7)
NEUTROPHILS NFR BLD MANUAL: 64.9 % (ref 42.7–76)
OVALOCYTES BLD QL SMEAR: ABNORMAL
PLAT MORPH BLD: NORMAL
PLATELET # BLD AUTO: 36 10*3/MM3 (ref 140–450)
PMV BLD AUTO: 12.8 FL (ref 6–12)
POIKILOCYTOSIS BLD QL SMEAR: ABNORMAL
RBC # BLD AUTO: 4.37 10*6/MM3 (ref 3.77–5.28)
VARIANT LYMPHS NFR BLD MANUAL: 33 % (ref 19.6–45.3)
WBC MORPH BLD: NORMAL
WBC NRBC COR # BLD: 4.44 10*3/MM3 (ref 3.4–10.8)

## 2023-08-10 PROCEDURE — 36415 COLL VENOUS BLD VENIPUNCTURE: CPT | Performed by: PSYCHIATRY & NEUROLOGY

## 2023-08-10 PROCEDURE — 82746 ASSAY OF FOLIC ACID SERUM: CPT | Performed by: PSYCHIATRY & NEUROLOGY

## 2023-08-10 PROCEDURE — 85025 COMPLETE CBC W/AUTO DIFF WBC: CPT | Performed by: PSYCHIATRY & NEUROLOGY

## 2023-08-10 PROCEDURE — 85007 BL SMEAR W/DIFF WBC COUNT: CPT | Performed by: PSYCHIATRY & NEUROLOGY

## 2023-08-10 RX ORDER — SERTRALINE HYDROCHLORIDE 100 MG/1
150 TABLET, FILM COATED ORAL DAILY
Qty: 45 TABLET | Refills: 2 | Status: SHIPPED | OUTPATIENT
Start: 2023-08-10

## 2023-08-10 RX ORDER — LAMOTRIGINE 25 MG/1
75 TABLET ORAL DAILY
COMMUNITY

## 2023-08-10 NOTE — PROGRESS NOTES
Subjective   Emily Zavala is a 48 y.o. female who presents today for follow up    Chief Complaint: Anxiety      History of Present Illness: Patient presented today for follow-up.  Since last visit, she has been doing fairly well.  Her seizures have been improved after starting Lamictal and she has not had any seizures for about 1 month.  She has been weaning off of Keppra for the new medication and notes that her anxiety and irritability are improved after the medication has dropped substantially.  She is not having any current medication side effects.  Sleep and appetite are stable.  She denies SI/HI/AVH.    The following portions of the patient's history were reviewed and updated as appropriate: allergies, current medications, past family history, past medical history, past social history, past surgical history and problem list.      Past Medical History:  Past Medical History:   Diagnosis Date    Bulging lumbar disc     Cancer     Skiin Cancer - vagina    Chronic ITP (idiopathic thrombocytopenia)     Clotting disorder     Elevated cholesterol     Factor 5 Leiden mutation, heterozygous     Heart murmur     History of transfusion     FFP    Hypertension     ocassionally    Lupus     Pulmonary emboli     Seizures        Social History:  Social History     Socioeconomic History    Marital status:    Tobacco Use    Smoking status: Never    Smokeless tobacco: Never   Vaping Use    Vaping Use: Never used   Substance and Sexual Activity    Alcohol use: No    Drug use: No    Sexual activity: Defer   She is  and not currently in a relationship.  She has 2 sons at home, 17 and 11 years old.  She completed the eighth grade but dropped out of high school after that.  She currently supports herself with disability due to physical ailments.  She denies any substance use including alcohol, tobacco, and illicit substances.    Family History:  Family History   Problem Relation Age of Onset    Breast cancer  Maternal Aunt    Patient has a sister with bipolar schizophrenia.    Past Surgical History:  Past Surgical History:   Procedure Laterality Date    ABDOMINAL SURGERY       SECTION      x2    D & C HYSTEROSCOPY ENDOMETRIAL ABLATION N/A 2017    Procedure: DILATATION AND CURETTAGE, HYSTEROSCOPY, ABLATION(PT.GETTING PLATELETS DAY BEFORE SURGERY) OUT PT.SURGERY TO CHECK PLATELETS DAY OF SURGERY;  Surgeon: Gail White DO;  Location: [facility] OR;  Service:     FRACTURE SURGERY Left     left foot ligament repair    LEEP      x2    SKIN BIOPSY      VAGINAL BIOPSY N/A 2019    Procedure: EXCISION OF VAGINAL LESION;  Surgeon: Jared Morgan MD;  Location: [facility] OR;  Service: Obstetrics/Gynecology       Problem List:  Patient Active Problem List   Diagnosis    Lupus    Factor V Leiden    Hypocortisolism    Memory loss, short term    Fatigue       Allergy:   No Known Allergies     Current Medications:   Current Outpatient Medications   Medication Sig Dispense Refill    busPIRone (BUSPAR) 15 MG tablet TAKE ONE TABLET BY MOUTH THREE TIMES DAILY 90 tablet 2    cholecalciferol (VITAMIN D3) 400 units tablet Take 1 tablet by mouth Daily.      clonazePAM (KlonoPIN) 0.5 MG tablet       Cyanocobalamin (VITAMIN B-12) 5000 MCG sublingual tablet one tablet daily      famotidine (PEPCID) 20 MG tablet       lamoTRIgine (LaMICtal) 25 MG tablet Take 3 tablets by mouth Daily. Taking 25mg in am and 50mg in pm.      mycophenolate (CELLCEPT) 200 MG/ML suspension Take 7.5 mL by mouth Every 12 (Twelve) Hours.      Nplate 125 MCG injection INJECT 100 MCG SUBCUTANEOUSLY EVERY 2 WEEKS AS DIRECTED WHEN PLATELLETS LESS THAN 30,000      predniSONE (DELTASONE) 5 MG tablet 1 tablet daily.  Sick day dosin tablets. 40 tablet 2    sertraline (ZOLOFT) 100 MG tablet TAKE 1 AND 1/2 TABLETS BY MOUTH EVERY DAY 45 tablet 2    levETIRAcetam (KEPPRA) 750 MG tablet Take 1 tablet by mouth 2 (Two) Times a Day for 30 days. 60 tablet 0     "loratadine (CLARITIN) 10 MG tablet       montelukast (SINGULAIR) 10 MG tablet Take 1 tablet by mouth every night at bedtime.       No current facility-administered medications for this visit.       Review of Symptoms:    Review of Systems   Constitutional:  Negative for activity change, appetite change, chills, fatigue and fever.   HENT: Negative.     Eyes: Negative.    Respiratory: Negative.     Cardiovascular: Negative.    Gastrointestinal:  Negative for diarrhea, nausea and vomiting.   Endocrine: Negative.    Genitourinary: Negative.    Musculoskeletal: Negative.    Skin: Negative.    Allergic/Immunologic: Negative.    Neurological:  Positive for seizures.   Hematological:         Factor V    Psychiatric/Behavioral:  Negative for agitation, behavioral problems, decreased concentration, dysphoric mood, hallucinations, self-injury, sleep disturbance, suicidal ideas, negative for hyperactivity and stress. The patient is not nervous/anxious (intermittent episodes).        Physical Exam:   Blood pressure 122/70, pulse 68, height 165.1 cm (65\"), weight 59.7 kg (131 lb 9.6 oz), not currently breastfeeding.     Appearance: CF of stated age in no acute distress  Gait, Station, Strength: WNL    Mental Status Exam:     Mental Status exam performed 08/10/2023  and patient shows no significant changes from previous exam.     Hygiene:   good  Cooperation:  Cooperative  Eye Contact:  Good  Psychomotor Behavior:  Appropriate  Affect:  Full range  Mood: normal, stable   Hopelessness: Denies  Speech:  Normal  Thought Process:  Goal directed and Linear  Thought Content:  Normal  Suicidal:  None  Homicidal:  None  Hallucinations:  None  Delusion:  None  Memory:  Intact  Orientation:  Person, Place, Time and Situation  Reliability:  good  Insight:  Good  Judgement:  Good  Impulse Control:  Good  Physical/Medical Issues:  Yes Factor V Leiden, seizure disorder, and lupus        Lab Results:   No visits with results within 1 Month(s) " from this visit.   Latest known visit with results is:   Office Visit on 06/06/2023   Component Date Value Ref Range Status    TSH 06/06/2023 3.890  0.270 - 4.200 uIU/mL Final    Free T4 06/06/2023 1.09  0.93 - 1.70 ng/dL Final    Thyroid Peroxidase Antibody 06/06/2023 <9  0 - 34 IU/mL Final    Thyroglobulin Ab 06/06/2023 <1.0  0.0 - 0.9 IU/mL Final    Thyroglobulin Antibody measured by [employer]       Assessment & Plan   Diagnoses and all orders for this visit:    1. Generalized anxiety disorder (Primary)  -     sertraline (ZOLOFT) 100 MG tablet; Take 1.5 tablets by mouth Daily.  Dispense: 45 tablet; Refill: 2    2. PMDD (premenstrual dysphoric disorder)  -     sertraline (ZOLOFT) 100 MG tablet; Take 1.5 tablets by mouth Daily.  Dispense: 45 tablet; Refill: 2    3. Substance induced mood disorder  -     sertraline (ZOLOFT) 100 MG tablet; Take 1.5 tablets by mouth Daily.  Dispense: 45 tablet; Refill: 2    4. Thrombocytopenia, alloimmune  -     CBC & Differential  -     Manual Differential    5. Low folate  -     Folate    -Patient overall doing fairly well  -Reviewed previous documentation  -Reviewed most recent available labs  -Sent for routine laboratory studies to evaluate possible causes of fatigue and for routine health maintenance  -Continue Zoloft 150 mg p.o. daily for anxiety, PMDD, and mood disorder related to Keppra   -Continue BuSpar to 15 mg 3 times daily for anxiety  -Encouraged to follow-up with neurology as scheduled  -Encouraged to follow-up with primary care provider for other medical comorbidities including lupus and factor V Leiden deficiency         Visit Diagnoses:    ICD-10-CM ICD-9-CM   1. Generalized anxiety disorder  F41.1 300.02   2. PMDD (premenstrual dysphoric disorder)  F32.81 625.4   3. Substance induced mood disorder  F19.94 292.84   4. Thrombocytopenia, alloimmune  D69.59 287.49   5. Low folate  E53.8 266.2       TREATMENT PLAN/GOALS: Continue supportive  psychotherapy efforts and medications as indicated. Treatment and medication options discussed during today's visit. Patient ackowledged and verbally consented to continue with current treatment plan and was educated on the importance of compliance with treatment and follow-up appointments.    MEDICATION ISSUES:    Discussed medication options and treatment plan of prescribed medication as well as the risks, benefits, and side effects including potential falls, possible impaired driving and metabolic adversities among others. Patient is agreeable to call the office with any worsening of symptoms or onset of side effects. Patient is agreeable to call 911 or go to the nearest ER should he/she begin having SI/HI. No medication side effects or related complaints today.     MEDS ORDERED DURING VISIT:  New Medications Ordered This Visit   Medications    sertraline (ZOLOFT) 100 MG tablet     Sig: Take 1.5 tablets by mouth Daily.     Dispense:  45 tablet     Refill:  2     This prescription was filled on 5/11/2023. Any refills authorized will be placed on file.       Return in about 3 months (around 11/10/2023).             This document has been electronically signed by Nithin Tyler MD  August 10, 2023 11:36 EDT

## 2023-08-11 ENCOUNTER — TELEPHONE (OUTPATIENT)
Dept: PSYCHIATRY | Facility: CLINIC | Age: 48
End: 2023-08-11
Payer: COMMERCIAL

## 2023-08-11 NOTE — TELEPHONE ENCOUNTER
Per Mariah, from in house lab called to report a critical platelet count of 36 from CBC collected yesterday.

## 2023-08-11 NOTE — PROGRESS NOTES
Made patient aware, this is her baseline due to chronic illness for the critical platelet value. She has this monitored and will need intervention per her hematologist should she drop below 20.

## 2023-09-22 ENCOUNTER — TRANSCRIBE ORDERS (OUTPATIENT)
Dept: ADMINISTRATIVE | Facility: HOSPITAL | Age: 48
End: 2023-09-22
Payer: COMMERCIAL

## 2023-09-22 DIAGNOSIS — Z12.31 SCREENING MAMMOGRAM FOR BREAST CANCER: Primary | ICD-10-CM

## 2023-11-02 DIAGNOSIS — F41.1 GENERALIZED ANXIETY DISORDER: ICD-10-CM

## 2023-11-02 DIAGNOSIS — F32.81 PMDD (PREMENSTRUAL DYSPHORIC DISORDER): ICD-10-CM

## 2023-11-03 RX ORDER — BUSPIRONE HYDROCHLORIDE 15 MG/1
TABLET ORAL
Qty: 90 TABLET | Refills: 2 | Status: SHIPPED | OUTPATIENT
Start: 2023-11-03

## 2023-11-18 PROCEDURE — 99284 EMERGENCY DEPT VISIT MOD MDM: CPT

## 2023-11-19 ENCOUNTER — APPOINTMENT (OUTPATIENT)
Dept: CT IMAGING | Facility: HOSPITAL | Age: 48
End: 2023-11-19
Payer: COMMERCIAL

## 2023-11-19 ENCOUNTER — HOSPITAL ENCOUNTER (EMERGENCY)
Facility: HOSPITAL | Age: 48
Discharge: HOME | End: 2023-11-19
Attending: STUDENT IN AN ORGANIZED HEALTH CARE EDUCATION/TRAINING PROGRAM
Payer: COMMERCIAL

## 2023-11-19 VITALS
HEART RATE: 71 BPM | BODY MASS INDEX: 21.05 KG/M2 | WEIGHT: 131 LBS | OXYGEN SATURATION: 100 % | RESPIRATION RATE: 18 BRPM | DIASTOLIC BLOOD PRESSURE: 89 MMHG | SYSTOLIC BLOOD PRESSURE: 140 MMHG | HEIGHT: 66 IN | TEMPERATURE: 98.1 F

## 2023-11-19 DIAGNOSIS — S16.1XXA STRAIN OF NECK MUSCLE, INITIAL ENCOUNTER: Primary | ICD-10-CM

## 2023-11-19 PROCEDURE — 72125 CT NECK SPINE W/O DYE: CPT

## 2023-11-19 RX ORDER — KETOROLAC TROMETHAMINE 30 MG/ML
60 INJECTION, SOLUTION INTRAMUSCULAR; INTRAVENOUS ONCE
Status: DISCONTINUED | OUTPATIENT
Start: 2023-11-19 | End: 2023-11-19 | Stop reason: HOSPADM

## 2023-11-19 RX ORDER — METHOCARBAMOL 500 MG/1
500 TABLET, FILM COATED ORAL ONCE
Status: DISCONTINUED | OUTPATIENT
Start: 2023-11-19 | End: 2023-11-19 | Stop reason: HOSPADM

## 2023-11-22 DIAGNOSIS — F32.81 PMDD (PREMENSTRUAL DYSPHORIC DISORDER): ICD-10-CM

## 2023-11-22 DIAGNOSIS — F41.1 GENERALIZED ANXIETY DISORDER: ICD-10-CM

## 2023-11-22 DIAGNOSIS — F19.94 SUBSTANCE INDUCED MOOD DISORDER: ICD-10-CM

## 2023-11-22 RX ORDER — SERTRALINE HYDROCHLORIDE 100 MG/1
TABLET, FILM COATED ORAL
Qty: 45 TABLET | Refills: 0 | Status: SHIPPED | OUTPATIENT
Start: 2023-11-22 | End: 2023-12-22
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
## 2023-12-07 ENCOUNTER — LAB (OUTPATIENT)
Dept: LAB | Facility: HOSPITAL | Age: 48
End: 2023-12-07
Payer: COMMERCIAL

## 2023-12-07 ENCOUNTER — OFFICE VISIT (OUTPATIENT)
Dept: ENDOCRINOLOGY | Facility: CLINIC | Age: 48
End: 2023-12-07
Payer: COMMERCIAL

## 2023-12-07 VITALS
DIASTOLIC BLOOD PRESSURE: 82 MMHG | WEIGHT: 138.4 LBS | OXYGEN SATURATION: 99 % | HEIGHT: 66 IN | BODY MASS INDEX: 22.24 KG/M2 | HEART RATE: 66 BPM | SYSTOLIC BLOOD PRESSURE: 112 MMHG

## 2023-12-07 DIAGNOSIS — E27.49 HYPOCORTISOLISM: Primary | ICD-10-CM

## 2023-12-07 PROCEDURE — 36415 COLL VENOUS BLD VENIPUNCTURE: CPT | Performed by: NURSE PRACTITIONER

## 2023-12-07 PROCEDURE — 82533 TOTAL CORTISOL: CPT | Performed by: NURSE PRACTITIONER

## 2023-12-07 RX ORDER — LACOSAMIDE 100 MG/1
100 TABLET ORAL EVERY 12 HOURS SCHEDULED
COMMUNITY

## 2023-12-07 NOTE — PROGRESS NOTES
Chief Complaint   Patient presents with    Fatigue, unspecified type        Referring Provider  No ref. provider found     HPI   Emily Zavala is a 48 y.o. female had concerns including Fatigue, unspecified type.   Hypocortisolism.     She has recently stopped taking her prednisone 3-4 months ago.  Since then she has had serial labs that were in range.  She denies any new symptoms related to her cortisol levels.      Cortisol History:  Her symptoms started about 2 to 3 years ago.  She initially had increased weight loss, decreased appetite, muscle weakness, extreme fatigue, hypotension, bradycardia, lightheadedness, salt cravings, hypoglycemia, nausea, diarrhea, abdominal pain, joint/muscle pain, irritability, depression, some mild body hair loss and some mild sexual dysfunction.  She has had intensive testing that determined that she has hypocortisolism.  She was started on 5 mg prednisone daily to which she has noticed a decrease in all of her symptoms.  Her main concern at this time is the fact that she has seizures monthly and is concerned that this may be associated with this.  She has been evaluated by neurology at .    Past Medical History:   Diagnosis Date    Bulging lumbar disc     Cancer     Skiin Cancer - vagina    Chronic ITP (idiopathic thrombocytopenia)     Clotting disorder     Elevated cholesterol     Factor 5 Leiden mutation, heterozygous     Heart murmur     History of transfusion     FFP    Hypertension     ocassionally    Lupus     Pulmonary emboli     Seizures      Past Surgical History:   Procedure Laterality Date    ABDOMINAL SURGERY       SECTION      x2    D & C HYSTEROSCOPY ENDOMETRIAL ABLATION N/A 2017    Procedure: DILATATION AND CURETTAGE, HYSTEROSCOPY, ABLATION(PT.GETTING PLATELETS DAY BEFORE SURGERY) OUT PT.SURGERY TO CHECK PLATELETS DAY OF SURGERY;  Surgeon: Gail White DO;  Location: [facility];  Service:     FRACTURE SURGERY Left     left foot ligament  repair    LEEP      x2    SKIN BIOPSY      VAGINAL BIOPSY N/A 2019    Procedure: EXCISION OF VAGINAL LESION;  Surgeon: Jared Morgan MD;  Location: [facility];  Service: Obstetrics/Gynecology      Family History   Problem Relation Age of Onset    Breast cancer Maternal Aunt       Social History     Socioeconomic History    Marital status:    Tobacco Use    Smoking status: Never    Smokeless tobacco: Never   Vaping Use    Vaping Use: Never used   Substance and Sexual Activity    Alcohol use: No    Drug use: No    Sexual activity: Defer      Allergies   Allergen Reactions    Bactrim [Sulfamethoxazole-Trimethoprim] Other (See Comments)     Pt stated it gives her a yeast infection      Current Outpatient Medications on File Prior to Visit   Medication Sig Dispense Refill    busPIRone (BUSPAR) 15 MG tablet TAKE ONE TABLET BY MOUTH THREE TIMES DAILY 90 tablet 2    cholecalciferol (VITAMIN D3) 400 units tablet Take 1 tablet by mouth Daily.      clonazePAM (KlonoPIN) 0.5 MG tablet       Cyanocobalamin (VITAMIN B-12) 5000 MCG sublingual tablet one tablet daily      famotidine (PEPCID) 20 MG tablet       lacosamide (VIMPAT) 100 MG tablet tablet Take 1 tablet by mouth Every 12 (Twelve) Hours.      lamoTRIgine (LaMICtal) 25 MG tablet Take 3 tablets by mouth Daily. Taking 25mg in am and 50mg in pm.      mycophenolate (CELLCEPT) 200 MG/ML suspension Take 7.5 mL by mouth Every 12 (Twelve) Hours.      Nplate 125 MCG injection INJECT 100 MCG SUBCUTANEOUSLY EVERY 2 WEEKS AS DIRECTED WHEN PLATELLETS LESS THAN 30,000      sertraline (ZOLOFT) 100 MG tablet TAKE 1&1/2 TABLET BY MOUTH DAILY 45 tablet 0    levETIRAcetam (KEPPRA) 750 MG tablet Take 1 tablet by mouth 2 (Two) Times a Day for 30 days. 60 tablet 0    predniSONE (DELTASONE) 5 MG tablet 1 tablet daily.  Sick day dosin tablets. 40 tablet 2     No current facility-administered medications on file prior to visit.        The following portions of the patient's  "history were reviewed and updated as appropriate: allergies, current medications, past family history, past medical history, past social history, past surgical history and problem list.    Review of Systems   Constitutional:  Positive for fatigue and unexpected weight loss.   Eyes: Negative.    Gastrointestinal:  Positive for abdominal pain, diarrhea and nausea.   Neurological:  Positive for seizures, light-headedness and memory problem.   All other systems reviewed and are negative.    /82 (BP Location: Left arm, Patient Position: Sitting, Cuff Size: Adult)   Pulse 66   Ht 167.6 cm (66\")   Wt 62.8 kg (138 lb 6.4 oz)   SpO2 99%   BMI 22.34 kg/m²      Physical Exam  Vitals reviewed.   Constitutional:       Appearance: Normal appearance.   Eyes:      Extraocular Movements: Extraocular movements intact.   Neck:      Thyroid: Thyromegaly present. No thyroid mass or thyroid tenderness.   Cardiovascular:      Rate and Rhythm: Normal rate.   Pulmonary:      Effort: Pulmonary effort is normal.   Neurological:      General: No focal deficit present.      Mental Status: She is alert and oriented to person, place, and time.   Psychiatric:         Mood and Affect: Mood normal.         Behavior: Behavior normal.         Thought Content: Thought content normal.         Judgment: Judgment normal.       CMP:  Lab Results   Component Value Date    BUN 12 12/05/2022    CREATININE 0.91 12/05/2022    EGFRIFNONA 65 09/19/2019    BCR 13.2 12/05/2022     12/05/2022    K 3.9 12/05/2022    CO2 25.0 12/05/2022    CALCIUM 9.2 12/05/2022    ALBUMIN 4.30 12/05/2022    LABIL2 1.1 (L) 02/14/2015    BILITOT 0.3 12/05/2022    ALKPHOS 51 12/05/2022    AST 17 12/05/2022    ALT 17 12/05/2022     Lipid Panel:  No results found for: \"CHOL\", \"TRIG\", \"HDL\", \"VLDL\", \"LDL\"  HbA1c:     Glucose:  No results found for: \"POCGLU\"  TSH:  Lab Results   Component Value Date    TSH 3.890 06/06/2023       Assessment and Plan    Diagnoses and all " orders for this visit:    1. Hypocortisolism (Primary)  Assessment & Plan:  -Patient appears asymptomatic and denies any symptoms in office today.  -Most recent labs showed electrolyte levels in range. Her BP and weight and overall well being appear stable.  -Will obtain random cortisol level today.  Will re-initiate meds as indicated.  -Follow-up in 6 months.    Orders:  -     Cortisol           Return in about 6 months (around 6/7/2024) for Follow-up appointment. The patient was instructed to contact the clinic with any interval questions or concerns.    This document has been electronically signed by RUSLAN Charles  December 7, 2023 14:39 EST  Endocrinology

## 2023-12-07 NOTE — ASSESSMENT & PLAN NOTE
-Patient appears asymptomatic and denies any symptoms in office today.  -Most recent labs showed electrolyte levels in range. Her BP and weight and overall well being appear stable.  -Will obtain random cortisol level today.  Will re-initiate meds as indicated.  -Follow-up in 6 months.

## 2023-12-08 LAB — CORTIS SERPL-MCNC: 6.51 MCG/DL

## 2024-02-14 DIAGNOSIS — F41.1 GENERALIZED ANXIETY DISORDER: ICD-10-CM

## 2024-02-14 DIAGNOSIS — F32.81 PMDD (PREMENSTRUAL DYSPHORIC DISORDER): ICD-10-CM

## 2024-02-14 RX ORDER — BUSPIRONE HYDROCHLORIDE 15 MG/1
TABLET ORAL
Qty: 90 TABLET | Refills: 2 | Status: SHIPPED | OUTPATIENT
Start: 2024-02-14

## 2024-03-27 ENCOUNTER — OFFICE VISIT (OUTPATIENT)
Dept: PSYCHIATRY | Facility: CLINIC | Age: 49
End: 2024-03-27
Payer: COMMERCIAL

## 2024-03-27 VITALS
HEART RATE: 51 BPM | SYSTOLIC BLOOD PRESSURE: 110 MMHG | DIASTOLIC BLOOD PRESSURE: 70 MMHG | BODY MASS INDEX: 22.14 KG/M2 | WEIGHT: 137.8 LBS | OXYGEN SATURATION: 100 % | HEIGHT: 66 IN

## 2024-03-27 DIAGNOSIS — F32.81 PMDD (PREMENSTRUAL DYSPHORIC DISORDER): ICD-10-CM

## 2024-03-27 DIAGNOSIS — F19.94 SUBSTANCE INDUCED MOOD DISORDER: ICD-10-CM

## 2024-03-27 DIAGNOSIS — F41.1 GENERALIZED ANXIETY DISORDER: ICD-10-CM

## 2024-03-27 RX ORDER — BIOTIN 1 MG
1000 TABLET ORAL DAILY
COMMUNITY

## 2024-03-27 RX ORDER — MONTELUKAST SODIUM 10 MG/1
10 TABLET ORAL
COMMUNITY

## 2024-03-27 RX ORDER — PANTOPRAZOLE SODIUM 20 MG/1
20 TABLET, DELAYED RELEASE ORAL
COMMUNITY
Start: 2023-10-12

## 2024-03-27 RX ORDER — FOLIC ACID 1 MG/1
1000 TABLET ORAL 2 TIMES DAILY
COMMUNITY

## 2024-03-27 RX ORDER — POTASSIUM CHLORIDE 750 MG/1
1 TABLET, FILM COATED, EXTENDED RELEASE ORAL DAILY
COMMUNITY
Start: 2024-02-22

## 2024-03-27 RX ORDER — LORATADINE 10 MG/1
10 TABLET ORAL DAILY
COMMUNITY

## 2024-03-27 RX ORDER — BUSPIRONE HYDROCHLORIDE 15 MG/1
15 TABLET ORAL 3 TIMES DAILY
Qty: 90 TABLET | Refills: 2 | Status: SHIPPED | OUTPATIENT
Start: 2024-03-27

## 2024-03-27 RX ORDER — SERTRALINE HYDROCHLORIDE 100 MG/1
150 TABLET, FILM COATED ORAL DAILY
Qty: 45 TABLET | Refills: 2 | Status: SHIPPED | OUTPATIENT
Start: 2024-03-27

## 2024-03-27 NOTE — PROGRESS NOTES
Subjective   Emily Zavala is a 48 y.o. female who presents today for follow up    Chief Complaint: Anxiety      History of Present Illness: Patient presented today for follow-up.  She reports that since last visit, she has had a lupus flare up and started infusion which caused a reaction so this was changed to 1 every 2 weeks.  She has not noticed an improvement in symptoms thus far but it has been only a week since her last injection.  She feels from mental health standpoint, she is doing fairly well.  She reports mood and anxiety symptoms are well controlled despite the stressors.  She is not having any medication side effects.  Sleep and appetite are stable.  She denies SI/HI/AVH.    The following portions of the patient's history were reviewed and updated as appropriate: allergies, current medications, past family history, past medical history, past social history, past surgical history and problem list.      Past Medical History:  Past Medical History:   Diagnosis Date    Bulging lumbar disc     Cancer     Skiin Cancer - vagina    Chronic ITP (idiopathic thrombocytopenia)     Clotting disorder     Elevated cholesterol     Factor 5 Leiden mutation, heterozygous     Heart murmur     History of transfusion     FFP    Hypertension     ocassionally    Lupus     Pulmonary emboli     Seizures        Social History:  Social History     Socioeconomic History    Marital status:    Tobacco Use    Smoking status: Never    Smokeless tobacco: Never   Vaping Use    Vaping status: Never Used   Substance and Sexual Activity    Alcohol use: No    Drug use: No    Sexual activity: Defer   She is  and not currently in a relationship.  She has 2 sons at home, 17 and 11 years old.  She completed the eighth grade but dropped out of high school after that.  She currently supports herself with disability due to physical ailments.  She denies any substance use including alcohol, tobacco, and illicit substances.    Family  History:  Family History   Problem Relation Age of Onset    Breast cancer Maternal Aunt    Patient has a sister with bipolar schizophrenia.    Past Surgical History:  Past Surgical History:   Procedure Laterality Date    ABDOMINAL SURGERY       SECTION      x2    D & C HYSTEROSCOPY ENDOMETRIAL ABLATION N/A 2017    Procedure: DILATATION AND CURETTAGE, HYSTEROSCOPY, ABLATION(PT.GETTING PLATELETS DAY BEFORE SURGERY) OUT PT.SURGERY TO CHECK PLATELETS DAY OF SURGERY;  Surgeon: Gail White DO;  Location: [facility] OR;  Service:     FRACTURE SURGERY Left     left foot ligament repair    LEEP      x2    SKIN BIOPSY      VAGINAL BIOPSY N/A 2019    Procedure: EXCISION OF VAGINAL LESION;  Surgeon: Jared Morgan MD;  Location: [facility] OR;  Service: Obstetrics/Gynecology       Problem List:  Patient Active Problem List   Diagnosis    Lupus    Factor V Leiden    Hypocortisolism    Memory loss, short term    Fatigue       Allergy:   Allergies   Allergen Reactions    Lamotrigine Hives    Bactrim [Sulfamethoxazole-Trimethoprim] Other (See Comments)     Pt stated it gives her a yeast infection    Sulfamethoxazole Rash    Trimethoprim Rash        Current Medications:   Current Outpatient Medications   Medication Sig Dispense Refill    ascorbic acid (VITAMIN C) 1000 MG tablet Take 1 tablet by mouth Daily.      Biotin 1000 MCG tablet Take 1,000 mcg by mouth Daily.      busPIRone (BUSPAR) 15 MG tablet TAKE ONE TABLET BY MOUTH THREE TIMES DAILY 90 tablet 2    cholecalciferol (VITAMIN D3) 400 units tablet Take 1 tablet by mouth Daily.      clonazePAM (KlonoPIN) 0.5 MG tablet       Cyanocobalamin (VITAMIN B-12) 5000 MCG sublingual tablet one tablet daily      famotidine (PEPCID) 20 MG tablet       folic acid (FOLVITE) 1 MG tablet Take 1 tablet by mouth 2 (Two) Times a Day.      lacosamide (VIMPAT) 100 MG tablet tablet Take 1 tablet by mouth Every 12 (Twelve) Hours.      loratadine (CLARITIN) 10 MG tablet Take  "1 tablet by mouth Daily.      montelukast (SINGULAIR) 10 MG tablet Take 1 tablet by mouth every night at bedtime.      mycophenolate (CELLCEPT) 200 MG/ML suspension Take 7.5 mL by mouth Every 12 (Twelve) Hours.      Nplate 125 MCG injection INJECT 100 MCG SUBCUTANEOUSLY EVERY 2 WEEKS AS DIRECTED WHEN PLATELLETS LESS THAN 30,000      pantoprazole (PROTONIX) 20 MG EC tablet 1 tablet.      potassium chloride 10 MEQ CR tablet Take 1 tablet by mouth Daily.      lamoTRIgine (LaMICtal) 25 MG tablet Take 3 tablets by mouth Daily. Taking 25mg in am and 50mg in pm. (Patient not taking: Reported on 3/27/2024)      sertraline (ZOLOFT) 100 MG tablet TAKE 1&1/2 TABLET BY MOUTH DAILY 45 tablet 0     No current facility-administered medications for this visit.       Review of Symptoms:    Review of Systems   Constitutional:  Negative for activity change, appetite change, chills, fatigue and fever.   HENT: Negative.     Eyes: Negative.    Respiratory: Negative.     Cardiovascular: Negative.    Gastrointestinal:  Negative for diarrhea, nausea and vomiting.   Endocrine: Negative.    Genitourinary: Negative.    Musculoskeletal: Negative.    Skin: Negative.    Allergic/Immunologic: Negative.    Neurological:  Positive for seizures.   Hematological:         Factor V    Psychiatric/Behavioral:  Negative for agitation, behavioral problems, decreased concentration, dysphoric mood, hallucinations, self-injury, sleep disturbance, suicidal ideas, negative for hyperactivity and stress. The patient is not nervous/anxious (intermittent episodes).          Physical Exam:   Blood pressure 110/70, pulse 51, height 167.6 cm (65.98\"), weight 62.5 kg (137 lb 12.8 oz), SpO2 100%, not currently breastfeeding.     Appearance: CF of stated age in no acute distress  Gait, Station, Strength: WNL    Mental Status Exam:     Mental Status exam performed 03/27/2024  and patient shows no significant changes from previous exam.     Hygiene:   good  Cooperation:  " Cooperative  Eye Contact:  Good  Psychomotor Behavior:  Appropriate  Affect:  Full range  Mood: normal, stable   Hopelessness: Denies  Speech:  Normal  Thought Process:  Goal directed and Linear  Thought Content:  Normal  Suicidal:  None  Homicidal:  None  Hallucinations:  None  Delusion:  None  Memory:  Intact  Orientation:  Person, Place, Time and Situation  Reliability:  good  Insight:  Good  Judgement:  Good  Impulse Control:  Good  Physical/Medical Issues:  Yes Factor V Leiden, seizure disorder, and lupus        Lab Results:   No visits with results within 1 Month(s) from this visit.   Latest known visit with results is:   Office Visit on 12/07/2023   Component Date Value Ref Range Status    Cortisol 12/07/2023 6.51    mcg/dL Final       Assessment & Plan   Diagnoses and all orders for this visit:    1. Generalized anxiety disorder  -     sertraline (ZOLOFT) 100 MG tablet; Take 1.5 tablets by mouth Daily.  Dispense: 45 tablet; Refill: 2  -     busPIRone (BUSPAR) 15 MG tablet; Take 1 tablet by mouth 3 (Three) Times a Day.  Dispense: 90 tablet; Refill: 2    2. PMDD (premenstrual dysphoric disorder)  -     sertraline (ZOLOFT) 100 MG tablet; Take 1.5 tablets by mouth Daily.  Dispense: 45 tablet; Refill: 2  -     busPIRone (BUSPAR) 15 MG tablet; Take 1 tablet by mouth 3 (Three) Times a Day.  Dispense: 90 tablet; Refill: 2    3. Substance induced mood disorder  -     sertraline (ZOLOFT) 100 MG tablet; Take 1.5 tablets by mouth Daily.  Dispense: 45 tablet; Refill: 2      -Patient currently having a lupus flareup but from mental health standpoint is doing well.  -Reviewed previous documentation  -Reviewed most recent available labs  -Sent for routine laboratory studies to evaluate possible causes of fatigue and for routine health maintenance  -Continue Zoloft 150 mg p.o. daily for anxiety, PMDD, and mood disorder related to Keppra   -Continue BuSpar to 15 mg 3 times daily for anxiety  -Encouraged to follow-up with  neurology as scheduled  -Encouraged to follow-up with primary care provider for other medical comorbidities including lupus and factor V Leiden deficiency         Visit Diagnoses:    ICD-10-CM ICD-9-CM   1. Generalized anxiety disorder  F41.1 300.02   2. PMDD (premenstrual dysphoric disorder)  F32.81 625.4   3. Substance induced mood disorder  F19.94 292.84         TREATMENT PLAN/GOALS: Continue supportive psychotherapy efforts and medications as indicated. Treatment and medication options discussed during today's visit. Patient ackowledged and verbally consented to continue with current treatment plan and was educated on the importance of compliance with treatment and follow-up appointments.    MEDICATION ISSUES:    Discussed medication options and treatment plan of prescribed medication as well as the risks, benefits, and side effects including potential falls, possible impaired driving and metabolic adversities among others. Patient is agreeable to call the office with any worsening of symptoms or onset of side effects. Patient is agreeable to call 911 or go to the nearest ER should he/she begin having SI/HI. No medication side effects or related complaints today.     MEDS ORDERED DURING VISIT:  New Medications Ordered This Visit   Medications    sertraline (ZOLOFT) 100 MG tablet     Sig: Take 1.5 tablets by mouth Daily.     Dispense:  45 tablet     Refill:  2     This prescription was filled on 11/2/2023. Any refills authorized will be placed on file.    busPIRone (BUSPAR) 15 MG tablet     Sig: Take 1 tablet by mouth 3 (Three) Times a Day.     Dispense:  90 tablet     Refill:  2     This prescription was filled on 1/25/2024. Any refills authorized will be placed on file.       Return in about 3 months (around 6/27/2024).             This document has been electronically signed by Nithin Tyler MD  March 27, 2024 10:28 EDT

## 2024-06-10 ENCOUNTER — OFFICE VISIT (OUTPATIENT)
Dept: ENDOCRINOLOGY | Facility: CLINIC | Age: 49
End: 2024-06-10
Payer: COMMERCIAL

## 2024-06-10 VITALS
HEART RATE: 65 BPM | SYSTOLIC BLOOD PRESSURE: 117 MMHG | WEIGHT: 139.8 LBS | DIASTOLIC BLOOD PRESSURE: 71 MMHG | OXYGEN SATURATION: 100 % | BODY MASS INDEX: 22.47 KG/M2 | HEIGHT: 66 IN

## 2024-06-10 DIAGNOSIS — E27.49 HYPOCORTISOLISM: Primary | ICD-10-CM

## 2024-06-10 PROCEDURE — 80053 COMPREHEN METABOLIC PANEL: CPT | Performed by: NURSE PRACTITIONER

## 2024-06-10 PROCEDURE — 1160F RVW MEDS BY RX/DR IN RCRD: CPT | Performed by: NURSE PRACTITIONER

## 2024-06-10 PROCEDURE — 99212 OFFICE O/P EST SF 10 MIN: CPT | Performed by: NURSE PRACTITIONER

## 2024-06-10 PROCEDURE — 1159F MED LIST DOCD IN RCRD: CPT | Performed by: NURSE PRACTITIONER

## 2024-06-10 NOTE — ASSESSMENT & PLAN NOTE
-Patient appears asymptomatic and denies any symptoms in office today.  -Will obtain labs today to monitor electrolytes.  Her BP and weight are stable.  -Follow-up in 6 months.

## 2024-06-10 NOTE — PROGRESS NOTES
Chief Complaint   Patient presents with    Hypocortisolism        Referring Provider  No ref. provider found     HPI   Emily Zavala is a 48 y.o. female had concerns including Hypocortisolism.   Hypocortisolism.     She has recently stopped taking her prednisone months ago.  Since then she has had serial labs that were in range.  She denies any new symptoms related to her cortisol levels.  She reports that she is doing well.    Cortisol History:  Her symptoms started about 2 to 3 years ago.  She initially had increased weight loss, decreased appetite, muscle weakness, extreme fatigue, hypotension, bradycardia, lightheadedness, salt cravings, hypoglycemia, nausea, diarrhea, abdominal pain, joint/muscle pain, irritability, depression, some mild body hair loss and some mild sexual dysfunction.  She has had intensive testing that determined that she has hypocortisolism.  She was started on 5 mg prednisone daily to which she has noticed a decrease in all of her symptoms.  Her main concern at this time is the fact that she has seizures monthly and is concerned that this may be associated with this.  She has been evaluated by neurology at .    Past Medical History:   Diagnosis Date    Bulging lumbar disc     Cancer     Skiin Cancer - vagina    Chronic ITP (idiopathic thrombocytopenia)     Clotting disorder     Elevated cholesterol     Factor 5 Leiden mutation, heterozygous     Heart murmur     History of transfusion     FFP    Hypertension     ocassionally    Lupus     Pulmonary emboli     Seizures      Past Surgical History:   Procedure Laterality Date    ABDOMINAL SURGERY       SECTION      x2    D & C HYSTEROSCOPY ENDOMETRIAL ABLATION N/A 2017    Procedure: DILATATION AND CURETTAGE, HYSTEROSCOPY, ABLATION(PT.GETTING PLATELETS DAY BEFORE SURGERY) OUT PT.SURGERY TO CHECK PLATELETS DAY OF SURGERY;  Surgeon: Gail White DO;  Location: [facility];  Service:     FRACTURE SURGERY Left     left foot  ligament repair    LEEP      x2    SKIN BIOPSY      VAGINAL BIOPSY N/A 9/24/2019    Procedure: EXCISION OF VAGINAL LESION;  Surgeon: Jared Morgan MD;  Location: [facility];  Service: Obstetrics/Gynecology      Family History   Problem Relation Age of Onset    Breast cancer Maternal Aunt       Social History     Socioeconomic History    Marital status:    Tobacco Use    Smoking status: Never    Smokeless tobacco: Never   Vaping Use    Vaping status: Never Used   Substance and Sexual Activity    Alcohol use: No    Drug use: No    Sexual activity: Defer      Allergies   Allergen Reactions    Lamotrigine Hives    Bactrim [Sulfamethoxazole-Trimethoprim] Other (See Comments)     Pt stated it gives her a yeast infection    Sulfamethoxazole Rash    Trimethoprim Rash      Current Outpatient Medications on File Prior to Visit   Medication Sig Dispense Refill    ascorbic acid (VITAMIN C) 1000 MG tablet Take 1 tablet by mouth Daily.      Biotin 1000 MCG tablet Take 1,000 mcg by mouth Daily.      busPIRone (BUSPAR) 15 MG tablet Take 1 tablet by mouth 3 (Three) Times a Day. 90 tablet 2    cholecalciferol (VITAMIN D3) 400 units tablet Take 1 tablet by mouth Daily.      clonazePAM (KlonoPIN) 0.5 MG tablet       Cyanocobalamin (VITAMIN B-12) 5000 MCG sublingual tablet one tablet daily      famotidine (PEPCID) 20 MG tablet       folic acid (FOLVITE) 1 MG tablet Take 1 tablet by mouth 2 (Two) Times a Day.      lacosamide (VIMPAT) 100 MG tablet tablet Take 1 tablet by mouth Every 12 (Twelve) Hours.      loratadine (CLARITIN) 10 MG tablet Take 1 tablet by mouth Daily.      montelukast (SINGULAIR) 10 MG tablet Take 1 tablet by mouth every night at bedtime.      mycophenolate (CELLCEPT) 200 MG/ML suspension Take 7.5 mL by mouth Every 12 (Twelve) Hours.      Nplate 125 MCG injection INJECT 100 MCG SUBCUTANEOUSLY EVERY 2 WEEKS AS DIRECTED WHEN PLATELLETS LESS THAN 30,000      pantoprazole (PROTONIX) 20 MG EC tablet 1 tablet.    "   potassium chloride 10 MEQ CR tablet Take 1 tablet by mouth Daily.      sertraline (ZOLOFT) 100 MG tablet Take 1.5 tablets by mouth Daily. 45 tablet 2     No current facility-administered medications on file prior to visit.        The following portions of the patient's history were reviewed and updated as appropriate: allergies, current medications, past family history, past medical history, past social history, past surgical history and problem list.    Review of Systems   Constitutional:  Positive for fatigue and unexpected weight loss.   Eyes: Negative.    Gastrointestinal:  Positive for abdominal pain, diarrhea and nausea.   Neurological:  Positive for seizures, light-headedness and memory problem.   All other systems reviewed and are negative.    /71 (BP Location: Right arm, Patient Position: Sitting, Cuff Size: Adult)   Pulse 65   Ht 167.6 cm (66\")   Wt 63.4 kg (139 lb 12.8 oz)   LMP 04/12/2024   SpO2 100%   BMI 22.56 kg/m²      Physical Exam  Vitals reviewed.   Constitutional:       Appearance: Normal appearance.   Eyes:      Extraocular Movements: Extraocular movements intact.   Neck:      Thyroid: Thyromegaly present. No thyroid mass or thyroid tenderness.   Cardiovascular:      Rate and Rhythm: Normal rate.   Pulmonary:      Effort: Pulmonary effort is normal.   Neurological:      General: No focal deficit present.      Mental Status: She is alert and oriented to person, place, and time.   Psychiatric:         Mood and Affect: Mood normal.         Behavior: Behavior normal.         Thought Content: Thought content normal.         Judgment: Judgment normal.       CMP:  Lab Results   Component Value Date    BUN 12 12/05/2022    CREATININE 0.91 12/05/2022    EGFRIFNONA 65 09/19/2019    BCR 13.2 12/05/2022     12/05/2022    K 3.9 12/05/2022    CO2 25.0 12/05/2022    CALCIUM 9.2 12/05/2022    ALBUMIN 4.30 12/05/2022    LABIL2 1.1 (L) 02/14/2015    BILITOT 0.3 12/05/2022    ALKPHOS 51 " "12/05/2022    AST 17 12/05/2022    ALT 17 12/05/2022     Lipid Panel:  No results found for: \"CHOL\", \"TRIG\", \"HDL\", \"VLDL\", \"LDL\"  HbA1c:     Glucose:  No results found for: \"POCGLU\"  TSH:  Lab Results   Component Value Date    TSH 3.890 06/06/2023       Assessment and Plan    Diagnoses and all orders for this visit:    1. Hypocortisolism (Primary)  Assessment & Plan:  -Patient appears asymptomatic and denies any symptoms in office today.  -Will obtain labs today to monitor electrolytes.  Her BP and weight are stable.  -Follow-up in 6 months.    Orders:  -     Comprehensive Metabolic Panel             Return in about 6 months (around 12/10/2024) for Follow-up appointment. The patient was instructed to contact the clinic with any interval questions or concerns.    This document has been electronically signed by RUSLAN Charles  Cindy 10, 2024 14:37 EDT  Endocrinology  "

## 2024-06-11 LAB
ALBUMIN SERPL-MCNC: 4.4 G/DL (ref 3.5–5.2)
ALBUMIN/GLOB SERPL: 1.7 G/DL
ALP SERPL-CCNC: 59 U/L (ref 39–117)
ALT SERPL W P-5'-P-CCNC: 18 U/L (ref 1–33)
ANION GAP SERPL CALCULATED.3IONS-SCNC: 10.9 MMOL/L (ref 5–15)
AST SERPL-CCNC: 20 U/L (ref 1–32)
BILIRUB SERPL-MCNC: 0.4 MG/DL (ref 0–1.2)
BUN SERPL-MCNC: 12 MG/DL (ref 6–20)
BUN/CREAT SERPL: 12.6 (ref 7–25)
CALCIUM SPEC-SCNC: 9.4 MG/DL (ref 8.6–10.5)
CHLORIDE SERPL-SCNC: 106 MMOL/L (ref 98–107)
CO2 SERPL-SCNC: 25.1 MMOL/L (ref 22–29)
CREAT SERPL-MCNC: 0.95 MG/DL (ref 0.57–1)
EGFRCR SERPLBLD CKD-EPI 2021: 75 ML/MIN/1.73
GLOBULIN UR ELPH-MCNC: 2.6 GM/DL
GLUCOSE SERPL-MCNC: 80 MG/DL (ref 65–99)
POTASSIUM SERPL-SCNC: 4.5 MMOL/L (ref 3.5–5.2)
PROT SERPL-MCNC: 7 G/DL (ref 6–8.5)
SODIUM SERPL-SCNC: 142 MMOL/L (ref 136–145)

## 2024-06-17 ENCOUNTER — TELEMEDICINE (OUTPATIENT)
Dept: PSYCHIATRY | Facility: CLINIC | Age: 49
End: 2024-06-17
Payer: COMMERCIAL

## 2024-06-17 DIAGNOSIS — F32.81 PMDD (PREMENSTRUAL DYSPHORIC DISORDER): ICD-10-CM

## 2024-06-17 DIAGNOSIS — F19.94 SUBSTANCE INDUCED MOOD DISORDER: ICD-10-CM

## 2024-06-17 DIAGNOSIS — F41.1 GENERALIZED ANXIETY DISORDER: ICD-10-CM

## 2024-06-17 PROCEDURE — 1160F RVW MEDS BY RX/DR IN RCRD: CPT | Performed by: PSYCHIATRY & NEUROLOGY

## 2024-06-17 PROCEDURE — 1159F MED LIST DOCD IN RCRD: CPT | Performed by: PSYCHIATRY & NEUROLOGY

## 2024-06-17 PROCEDURE — 99214 OFFICE O/P EST MOD 30 MIN: CPT | Performed by: PSYCHIATRY & NEUROLOGY

## 2024-06-17 RX ORDER — SERTRALINE HYDROCHLORIDE 100 MG/1
150 TABLET, FILM COATED ORAL DAILY
Qty: 45 TABLET | Refills: 2 | Status: SHIPPED | OUTPATIENT
Start: 2024-06-17

## 2024-06-17 RX ORDER — BUSPIRONE HYDROCHLORIDE 15 MG/1
15 TABLET ORAL 3 TIMES DAILY
Qty: 90 TABLET | Refills: 2 | Status: SHIPPED | OUTPATIENT
Start: 2024-06-17

## 2024-06-17 NOTE — PROGRESS NOTES
Subjective   Emily Zavala is a 48 y.o. female who presents today for follow up    Chief Complaint: Anxiety    This provider is located at [facility], [address] [place] [address]. The Patient is seen remotely at home, using Epic Mychart. Patient is being seen via telehealth and stated they are in a secure environment for this session. The patient’s condition being diagnosed/treated is appropriate for telemedicine. The provider identified himself as well as his credentials.   The patient gave consent to be seen remotely, and when consent is given they understand that the consent allows for patient identifiable information to be sent to a third party as needed.   They may refuse to be seen remotely at any time. The electronic data is encrypted and password protected, and the patient has been advised of the potential risks to privacy not withstanding such measures      History of Present Illness: Patient presented today for follow-up.  Since last visit, she reports that medications have kept her fairly stable.  She has had some increased dysphoria and anxiety lately due to situational circumstances.  Her son is having some health and school issues.  His blood pressure is elevated and he is 16 years old and weighs about 130 pounds so figuring this out has been stressful.  She also endorses some difficulty with her lupus and was started on new medication that caused fatigue and was not overly beneficial to symptoms so she stopped the medication as she does not want to worsen her immunity.  Despite this, she feels fairly stable and is not having medication side effects.  She does not feel that any adjustments need to be made today.  She is not having any major medication side effects.  She denies SI/HI/AVH.  Sleep and appetite are stable.      The following portions of the patient's history were reviewed and updated as appropriate: allergies, current medications, past family history, past medical history, past  social history, past surgical history and problem list.      Past Medical History:  Past Medical History:   Diagnosis Date    Bulging lumbar disc     Cancer     Skiin Cancer - vagina    Chronic ITP (idiopathic thrombocytopenia)     Clotting disorder     Elevated cholesterol     Factor 5 Leiden mutation, heterozygous     Heart murmur     History of transfusion     FFP    Hypertension     ocassionally    Lupus     Pulmonary emboli     Seizures        Social History:  Social History     Socioeconomic History    Marital status:    Tobacco Use    Smoking status: Never    Smokeless tobacco: Never   Vaping Use    Vaping status: Never Used   Substance and Sexual Activity    Alcohol use: No    Drug use: No    Sexual activity: Defer   She is  and not currently in a relationship.  She has 2 sons at home, 17 and 11 years old.  She completed the eighth grade but dropped out of high school after that.  She currently supports herself with disability due to physical ailments.  She denies any substance use including alcohol, tobacco, and illicit substances.    Family History:  Family History   Problem Relation Age of Onset    Breast cancer Maternal Aunt    Patient has a sister with bipolar schizophrenia.    Past Surgical History:  Past Surgical History:   Procedure Laterality Date    ABDOMINAL SURGERY       SECTION      x2    D & C HYSTEROSCOPY ENDOMETRIAL ABLATION N/A 2017    Procedure: DILATATION AND CURETTAGE, HYSTEROSCOPY, ABLATION(PT.GETTING PLATELETS DAY BEFORE SURGERY) OUT PT.SURGERY TO CHECK PLATELETS DAY OF SURGERY;  Surgeon: Gail White DO;  Location: [facility] OR;  Service:     FRACTURE SURGERY Left     left foot ligament repair    LEEP      x2    SKIN BIOPSY      VAGINAL BIOPSY N/A 2019    Procedure: EXCISION OF VAGINAL LESION;  Surgeon: Jared Morgan MD;  Location: [facility];  Service: Obstetrics/Gynecology       Problem List:  Patient Active Problem List   Diagnosis     Lupus    Factor V Leiden    Hypocortisolism    Memory loss, short term    Fatigue       Allergy:   Allergies   Allergen Reactions    Lamotrigine Hives    Bactrim [Sulfamethoxazole-Trimethoprim] Other (See Comments)     Pt stated it gives her a yeast infection    Sulfamethoxazole Rash    Trimethoprim Rash        Current Medications:   Current Outpatient Medications   Medication Sig Dispense Refill    ascorbic acid (VITAMIN C) 1000 MG tablet Take 1 tablet by mouth Daily.      Biotin 1000 MCG tablet Take 1,000 mcg by mouth Daily.      busPIRone (BUSPAR) 15 MG tablet Take 1 tablet by mouth 3 (Three) Times a Day. 90 tablet 2    cholecalciferol (VITAMIN D3) 400 units tablet Take 1 tablet by mouth Daily.      clonazePAM (KlonoPIN) 0.5 MG tablet       Cyanocobalamin (VITAMIN B-12) 5000 MCG sublingual tablet one tablet daily      famotidine (PEPCID) 20 MG tablet       folic acid (FOLVITE) 1 MG tablet Take 1 tablet by mouth 2 (Two) Times a Day.      lacosamide (VIMPAT) 100 MG tablet tablet Take 1 tablet by mouth Every 12 (Twelve) Hours.      loratadine (CLARITIN) 10 MG tablet Take 1 tablet by mouth Daily.      montelukast (SINGULAIR) 10 MG tablet Take 1 tablet by mouth every night at bedtime.      mycophenolate (CELLCEPT) 200 MG/ML suspension Take 7.5 mL by mouth Every 12 (Twelve) Hours.      Nplate 125 MCG injection INJECT 100 MCG SUBCUTANEOUSLY EVERY 2 WEEKS AS DIRECTED WHEN PLATELLETS LESS THAN 30,000      pantoprazole (PROTONIX) 20 MG EC tablet 1 tablet.      potassium chloride 10 MEQ CR tablet Take 1 tablet by mouth Daily.      sertraline (ZOLOFT) 100 MG tablet Take 1.5 tablets by mouth Daily. 45 tablet 2     No current facility-administered medications for this visit.       Review of Symptoms:    Review of Systems   Constitutional:  Negative for activity change, appetite change, chills, fatigue and fever.   HENT: Negative.     Eyes: Negative.    Respiratory: Negative.  Negative for shortness of breath.     Cardiovascular: Negative.  Negative for chest pain.   Gastrointestinal:  Negative for diarrhea, nausea and vomiting.   Endocrine: Negative.    Genitourinary: Negative.    Musculoskeletal: Negative.    Skin: Negative.    Allergic/Immunologic: Negative.    Neurological:  Positive for dizziness and seizures. Negative for confusion.   Hematological:         Factor V    Psychiatric/Behavioral:  Positive for depressed mood. Negative for agitation, behavioral problems, decreased concentration, dysphoric mood, hallucinations, self-injury, sleep disturbance, suicidal ideas, negative for hyperactivity and stress. The patient is not nervous/anxious (intermittent episodes).          Physical Exam:   Last menstrual period 04/12/2024, not currently breastfeeding.     Appearance: CF of stated age in no acute distress  Gait, Station, Strength: WNL    Mental Status Exam:       Hygiene:   good  Cooperation:  Cooperative  Eye Contact:  Good  Psychomotor Behavior:  Appropriate  Affect:  Full range  Mood:  dysphoric and stressed but managing    Hopelessness: Denies  Speech:  Normal  Thought Process:  Goal directed and Linear  Thought Content:  Normal  Suicidal:  None  Homicidal:  None  Hallucinations:  None  Delusion:  None  Memory:  Intact  Orientation:  Person, Place, Time and Situation  Reliability:  good  Insight:  Good  Judgement:  Good  Impulse Control:  Good  Physical/Medical Issues:  Yes Factor V Leiden, seizure disorder, and lupus        Lab Results:   Office Visit on 06/10/2024   Component Date Value Ref Range Status    Glucose 06/10/2024 80  65 - 99 mg/dL Final    BUN 06/10/2024 12  6 - 20 mg/dL Final    Creatinine 06/10/2024 0.95  0.57 - 1.00 mg/dL Final    Sodium 06/10/2024 142  136 - 145 mmol/L Final    Potassium 06/10/2024 4.5  3.5 - 5.2 mmol/L Final    Chloride 06/10/2024 106  98 - 107 mmol/L Final    CO2 06/10/2024 25.1  22.0 - 29.0 mmol/L Final    Calcium 06/10/2024 9.4  8.6 - 10.5 mg/dL Final    Total Protein  06/10/2024 7.0  6.0 - 8.5 g/dL Final    Albumin 06/10/2024 4.4  3.5 - 5.2 g/dL Final    ALT (SGPT) 06/10/2024 18  1 - 33 U/L Final    AST (SGOT) 06/10/2024 20  1 - 32 U/L Final    Alkaline Phosphatase 06/10/2024 59  39 - 117 U/L Final    Total Bilirubin 06/10/2024 0.4  0.0 - 1.2 mg/dL Final    Globulin 06/10/2024 2.6  gm/dL Final    A/G Ratio 06/10/2024 1.7  g/dL Final    BUN/Creatinine Ratio 06/10/2024 12.6  7.0 - 25.0 Final    Anion Gap 06/10/2024 10.9  5.0 - 15.0 mmol/L Final    eGFR 06/10/2024 75.0  >60.0 mL/min/1.73 Final       Assessment & Plan   Diagnoses and all orders for this visit:    1. Generalized anxiety disorder  -     busPIRone (BUSPAR) 15 MG tablet; Take 1 tablet by mouth 3 (Three) Times a Day.  Dispense: 90 tablet; Refill: 2  -     sertraline (ZOLOFT) 100 MG tablet; Take 1.5 tablets by mouth Daily.  Dispense: 45 tablet; Refill: 2    2. PMDD (premenstrual dysphoric disorder)  -     busPIRone (BUSPAR) 15 MG tablet; Take 1 tablet by mouth 3 (Three) Times a Day.  Dispense: 90 tablet; Refill: 2  -     sertraline (ZOLOFT) 100 MG tablet; Take 1.5 tablets by mouth Daily.  Dispense: 45 tablet; Refill: 2    3. Substance induced mood disorder  -     sertraline (ZOLOFT) 100 MG tablet; Take 1.5 tablets by mouth Daily.  Dispense: 45 tablet; Refill: 2        -Patient having some dysphoria and situational stressors but feels that medication is helping her manage appropriately.  -Reviewed previous documentation  -Reviewed most recent available labs  -Continue Zoloft 150 mg p.o. daily for anxiety, PMDD, and mood disorder related to Keppra   -Continue BuSpar to 15 mg 3 times daily for anxiety  -Encouraged to follow-up with neurology as scheduled  -Encouraged to follow-up with primary care provider for other medical comorbidities including lupus and factor V Leiden deficiency   -Approximate appointment time 9 AM to 9:25 AM via video visit        Visit Diagnoses:    ICD-10-CM ICD-9-CM   1. Generalized anxiety disorder   F41.1 300.02   2. PMDD (premenstrual dysphoric disorder)  F32.81 625.4   3. Substance induced mood disorder  F19.94 292.84           TREATMENT PLAN/GOALS: Continue supportive psychotherapy efforts and medications as indicated. Treatment and medication options discussed during today's visit. Patient ackowledged and verbally consented to continue with current treatment plan and was educated on the importance of compliance with treatment and follow-up appointments.    MEDICATION ISSUES:    Discussed medication options and treatment plan of prescribed medication as well as the risks, benefits, and side effects including potential falls, possible impaired driving and metabolic adversities among others. Patient is agreeable to call the office with any worsening of symptoms or onset of side effects. Patient is agreeable to call 911 or go to the nearest ER should he/she begin having SI/HI. No medication side effects or related complaints today.     MEDS ORDERED DURING VISIT:  New Medications Ordered This Visit   Medications    busPIRone (BUSPAR) 15 MG tablet     Sig: Take 1 tablet by mouth 3 (Three) Times a Day.     Dispense:  90 tablet     Refill:  2     This prescription was filled on 1/25/2024. Any refills authorized will be placed on file.    sertraline (ZOLOFT) 100 MG tablet     Sig: Take 1.5 tablets by mouth Daily.     Dispense:  45 tablet     Refill:  2     This prescription was filled on 11/2/2023. Any refills authorized will be placed on file.       Return in about 3 months (around 9/17/2024).             This document has been electronically signed by Nithin Tyler MD  June 17, 2024 09:02 EDT

## 2024-08-01 DIAGNOSIS — F19.94 SUBSTANCE INDUCED MOOD DISORDER: ICD-10-CM

## 2024-08-01 DIAGNOSIS — F32.81 PMDD (PREMENSTRUAL DYSPHORIC DISORDER): ICD-10-CM

## 2024-08-01 DIAGNOSIS — F41.1 GENERALIZED ANXIETY DISORDER: ICD-10-CM

## 2024-08-01 RX ORDER — SERTRALINE HYDROCHLORIDE 100 MG/1
150 TABLET, FILM COATED ORAL DAILY
Qty: 45 TABLET | Refills: 2 | Status: SHIPPED | OUTPATIENT
Start: 2024-08-01

## 2024-09-10 DIAGNOSIS — F32.81 PMDD (PREMENSTRUAL DYSPHORIC DISORDER): ICD-10-CM

## 2024-09-10 DIAGNOSIS — F41.1 GENERALIZED ANXIETY DISORDER: ICD-10-CM

## 2024-09-11 RX ORDER — BUSPIRONE HYDROCHLORIDE 15 MG/1
15 TABLET ORAL 3 TIMES DAILY
Qty: 90 TABLET | Refills: 2 | Status: SHIPPED | OUTPATIENT
Start: 2024-09-11

## 2024-09-17 ENCOUNTER — TELEMEDICINE (OUTPATIENT)
Dept: PSYCHIATRY | Facility: CLINIC | Age: 49
End: 2024-09-17
Payer: COMMERCIAL

## 2024-09-17 DIAGNOSIS — F32.81 PMDD (PREMENSTRUAL DYSPHORIC DISORDER): Primary | ICD-10-CM

## 2024-09-17 DIAGNOSIS — F41.1 GENERALIZED ANXIETY DISORDER: ICD-10-CM

## 2024-10-04 ENCOUNTER — TELEPHONE (OUTPATIENT)
Dept: ENDOCRINOLOGY | Facility: CLINIC | Age: 49
End: 2024-10-04

## 2024-10-04 RX ORDER — LEVOTHYROXINE SODIUM 25 UG/1
25 TABLET ORAL DAILY
Qty: 30 TABLET | Refills: 2 | Status: SHIPPED | OUTPATIENT
Start: 2024-10-04

## 2024-10-04 NOTE — TELEPHONE ENCOUNTER
Patient reports that she had labs at her PCP and her TSH was noted to be >5.  She is having hair loss and fatigue.  Will send in T4 25 mcg QD.  Reminded of proper administration including taking 7 pills per week on an empty stomach with no missed doses, waiting 30-60 minutes prior to other medications or food. Will obtain labs at next follow-up appt to determine if medication adjustment is needed.

## 2024-10-04 NOTE — TELEPHONE ENCOUNTER
Provider: ROBBIE LEWIS    Caller: ALEENA DAVIS    Relationship to Patient: SELF    Reason for Call: PATIENT HAD LABS DONE A FEW DAYS AGO AND HAS REC'D THE RESULTS. SHE WOULD LIKE A CALL TO TALK TO ROBBIE. PLEASE ADVISE

## 2024-10-11 ENCOUNTER — TRANSCRIBE ORDERS (OUTPATIENT)
Dept: ADMINISTRATIVE | Facility: HOSPITAL | Age: 49
End: 2024-10-11
Payer: COMMERCIAL

## 2024-10-11 DIAGNOSIS — Z12.31 VISIT FOR SCREENING MAMMOGRAM: Primary | ICD-10-CM

## 2024-11-20 DIAGNOSIS — F41.1 GENERALIZED ANXIETY DISORDER: ICD-10-CM

## 2024-11-20 DIAGNOSIS — F19.94 SUBSTANCE INDUCED MOOD DISORDER: ICD-10-CM

## 2024-11-20 DIAGNOSIS — F32.81 PMDD (PREMENSTRUAL DYSPHORIC DISORDER): ICD-10-CM

## 2024-11-20 RX ORDER — SERTRALINE HYDROCHLORIDE 100 MG/1
150 TABLET, FILM COATED ORAL DAILY
Qty: 45 TABLET | Refills: 2 | Status: SHIPPED | OUTPATIENT
Start: 2024-11-20

## 2024-12-10 ENCOUNTER — TELEMEDICINE (OUTPATIENT)
Dept: PSYCHIATRY | Facility: CLINIC | Age: 49
End: 2024-12-10
Payer: COMMERCIAL

## 2024-12-10 DIAGNOSIS — F32.81 PMDD (PREMENSTRUAL DYSPHORIC DISORDER): ICD-10-CM

## 2024-12-10 DIAGNOSIS — F41.1 GENERALIZED ANXIETY DISORDER: ICD-10-CM

## 2024-12-10 DIAGNOSIS — F19.94 SUBSTANCE INDUCED MOOD DISORDER: ICD-10-CM

## 2024-12-10 RX ORDER — BUSPIRONE HYDROCHLORIDE 15 MG/1
15 TABLET ORAL 3 TIMES DAILY
Qty: 90 TABLET | Refills: 2 | Status: SHIPPED | OUTPATIENT
Start: 2024-12-10

## 2024-12-10 RX ORDER — SERTRALINE HYDROCHLORIDE 100 MG/1
150 TABLET, FILM COATED ORAL DAILY
Qty: 45 TABLET | Refills: 2 | Status: SHIPPED | OUTPATIENT
Start: 2024-12-10

## 2024-12-10 NOTE — PROGRESS NOTES
Subjective   Emily Zavala is a 49 y.o. female who presents today for follow up    Chief Complaint: Anxiety    This provider is located at [facility], [address] [place] [address]. The Patient is seen remotely at her home, using [employer]. Patient is being seen via telehealth and stated they are in a secure environment for this session. The patient’s condition being diagnosed/treated is appropriate for telemedicine. The provider identified himself as well as his credentials.   The patient gave consent to be seen remotely, and when consent is given they understand that the consent allows for patient identifiable information to be sent to a third party as needed.   They may refuse to be seen remotely at any time. The electronic data is encrypted and password protected, and the patient has been advised of the potential risks to privacy not withstanding such measures      History of Present Illness: Patient presenting today for follow-up.  Since last visit, she reports that she has had a multitude of life stressors all at once.  She states that her oldest son got his CDL's and is now traveling a lot so she does not get to see him as much which has been difficult.  She also states that her seizures have reduced and stopped but her lupus is still flaring and her platelets will not stay up and are currently very low.  She also had a mammogram which showed a small density which they have referred for biopsy given her aunt had breast cancer in the past.  She also was informed that she will have to find a place to move as her and her roommate are living in a home owned by his daughter and she has threatened eviction out of the blue.  She is having so significant stress and intermittent anxiety and dysphoria about all of these situations but it does seem to be well within the context of the situation and she is coping as best she can.  She does feel the medication is helping and she is not having medication side  effects.  She denies SI/HI/AVH.      The following portions of the patient's history were reviewed and updated as appropriate: allergies, current medications, past family history, past medical history, past social history, past surgical history and problem list.      Past Medical History:  Past Medical History:   Diagnosis Date    Bulging lumbar disc     Cancer     Skiin Cancer - vagina    Chronic ITP (idiopathic thrombocytopenia)     Clotting disorder     Elevated cholesterol     Factor 5 Leiden mutation, heterozygous     Heart murmur     History of transfusion     FFP    Hypertension     ocassionally    Lupus     Pulmonary emboli     Seizures        Social History:  Social History     Socioeconomic History    Marital status:    Tobacco Use    Smoking status: Never    Smokeless tobacco: Never   Vaping Use    Vaping status: Never Used   Substance and Sexual Activity    Alcohol use: No    Drug use: No    Sexual activity: Defer   She is  and not currently in a relationship.  She has 2 sons.  She completed the eighth grade but dropped out of high school after that.  She currently supports herself with disability due to physical ailments.  She denies any substance use including alcohol, tobacco, and illicit substances.    Family History:  Family History   Problem Relation Age of Onset    Breast cancer Maternal Aunt    Patient has a sister with bipolar schizophrenia.    Past Surgical History:  Past Surgical History:   Procedure Laterality Date    ABDOMINAL SURGERY       SECTION      x2    D & C HYSTEROSCOPY ENDOMETRIAL ABLATION N/A 2017    Procedure: DILATATION AND CURETTAGE, HYSTEROSCOPY, ABLATION(PT.GETTING PLATELETS DAY BEFORE SURGERY) OUT PT.SURGERY TO CHECK PLATELETS DAY OF SURGERY;  Surgeon: Gail White DO;  Location: [facility];  Service:     FRACTURE SURGERY Left     left foot ligament repair    LEEP      x2    SKIN BIOPSY      VAGINAL BIOPSY N/A 2019    Procedure:  EXCISION OF VAGINAL LESION;  Surgeon: Jared Morgan MD;  Location: [facility];  Service: Obstetrics/Gynecology       Problem List:  Patient Active Problem List   Diagnosis    Lupus    Factor V Leiden    Hypocortisolism    Memory loss, short term    Fatigue       Allergy:   Allergies   Allergen Reactions    Lamotrigine Hives    Bactrim [Sulfamethoxazole-Trimethoprim] Other (See Comments)     Pt stated it gives her a yeast infection    Sulfamethoxazole Rash    Trimethoprim Rash        Current Medications:   Current Outpatient Medications   Medication Sig Dispense Refill    ascorbic acid (VITAMIN C) 1000 MG tablet Take 1 tablet by mouth Daily.      Biotin 1000 MCG tablet Take 1,000 mcg by mouth Daily.      busPIRone (BUSPAR) 15 MG tablet TAKE ONE TABLET BY MOUTH THREE TIMES DAILY 90 tablet 2    cholecalciferol (VITAMIN D3) 400 units tablet Take 1 tablet by mouth Daily.      clonazePAM (KlonoPIN) 0.5 MG tablet       Cyanocobalamin (VITAMIN B-12) 5000 MCG sublingual tablet one tablet daily      famotidine (PEPCID) 20 MG tablet       folic acid (FOLVITE) 1 MG tablet Take 1 tablet by mouth 2 (Two) Times a Day.      lacosamide (VIMPAT) 100 MG tablet tablet Take 1 tablet by mouth Every 12 (Twelve) Hours.      levothyroxine (SYNTHROID, LEVOTHROID) 25 MCG tablet Take 1 tablet by mouth Daily. 30 tablet 2    loratadine (CLARITIN) 10 MG tablet Take 1 tablet by mouth Daily.      montelukast (SINGULAIR) 10 MG tablet Take 1 tablet by mouth every night at bedtime.      mycophenolate (CELLCEPT) 200 MG/ML suspension Take 7.5 mL by mouth Every 12 (Twelve) Hours.      Nplate 125 MCG injection INJECT 100 MCG SUBCUTANEOUSLY EVERY 2 WEEKS AS DIRECTED WHEN PLATELLETS LESS THAN 30,000      pantoprazole (PROTONIX) 20 MG EC tablet 1 tablet.      potassium chloride 10 MEQ CR tablet Take 1 tablet by mouth Daily.      sertraline (ZOLOFT) 100 MG tablet TAKE 1 AND 1/2 TABLETS BY MOUTH EVERY DAY 45 tablet 2     No current facility-administered  medications for this visit.       Review of Symptoms:    Review of Systems   Constitutional:  Positive for fatigue. Negative for activity change, appetite change, chills and fever.   HENT: Negative.     Eyes: Negative.    Respiratory: Negative.  Negative for shortness of breath.    Cardiovascular: Negative.  Negative for chest pain.   Gastrointestinal:  Negative for diarrhea, nausea and vomiting.   Endocrine: Negative.    Genitourinary: Negative.    Musculoskeletal: Negative.    Skin: Negative.    Allergic/Immunologic: Negative.    Neurological:  Positive for dizziness and seizures. Negative for confusion.   Hematological:  Bruises/bleeds easily.        Factor V    Psychiatric/Behavioral:  Positive for decreased concentration. Negative for agitation, behavioral problems, dysphoric mood, hallucinations, self-injury, sleep disturbance, suicidal ideas, negative for hyperactivity, depressed mood and stress. The patient is not nervous/anxious (intermittent episodes).          Physical Exam:   not currently breastfeeding. Video Visit    Appearance: CF of stated age in no acute distress  Gait, Station, Strength: Video Visit     Mental Status Exam:       Hygiene:   good  Cooperation:  Cooperative  Eye Contact:  Good  Psychomotor Behavior:  Appropriate  Affect:  Full range  Mood:  Stressed and dysphoric (situational and medical)    Hopelessness: Denies  Speech:  Normal  Thought Process:  Goal directed and Linear  Thought Content:  Normal  Suicidal:  None  Homicidal:  None  Hallucinations:  None  Delusion:  None  Memory:  Intact  Orientation:  Person, Place, Time and Situation  Reliability:  good  Insight:  Good  Judgement:  Good  Impulse Control:  Good  Physical/Medical Issues:  Yes Factor V Leiden, seizure disorder, and lupus        Lab Results:   No visits with results within 1 Month(s) from this visit.   Latest known visit with results is:   Office Visit on 06/10/2024   Component Date Value Ref Range Status    Glucose  06/10/2024 80  65 - 99 mg/dL Final    BUN 06/10/2024 12  6 - 20 mg/dL Final    Creatinine 06/10/2024 0.95  0.57 - 1.00 mg/dL Final    Sodium 06/10/2024 142  136 - 145 mmol/L Final    Potassium 06/10/2024 4.5  3.5 - 5.2 mmol/L Final    Chloride 06/10/2024 106  98 - 107 mmol/L Final    CO2 06/10/2024 25.1  22.0 - 29.0 mmol/L Final    Calcium 06/10/2024 9.4  8.6 - 10.5 mg/dL Final    Total Protein 06/10/2024 7.0  6.0 - 8.5 g/dL Final    Albumin 06/10/2024 4.4  3.5 - 5.2 g/dL Final    ALT (SGPT) 06/10/2024 18  1 - 33 U/L Final    AST (SGOT) 06/10/2024 20  1 - 32 U/L Final    Alkaline Phosphatase 06/10/2024 59  39 - 117 U/L Final    Total Bilirubin 06/10/2024 0.4  0.0 - 1.2 mg/dL Final    Globulin 06/10/2024 2.6  gm/dL Final    A/G Ratio 06/10/2024 1.7  g/dL Final    BUN/Creatinine Ratio 06/10/2024 12.6  7.0 - 25.0 Final    Anion Gap 06/10/2024 10.9  5.0 - 15.0 mmol/L Final    eGFR 06/10/2024 75.0  >60.0 mL/min/1.73 Final       Assessment & Plan   Diagnoses and all orders for this visit:    1. Generalized anxiety disorder  -     busPIRone (BUSPAR) 15 MG tablet; Take 1 tablet by mouth 3 (Three) Times a Day.  Dispense: 90 tablet; Refill: 2  -     sertraline (ZOLOFT) 100 MG tablet; Take 1.5 tablets by mouth Daily.  Dispense: 45 tablet; Refill: 2    2. PMDD (premenstrual dysphoric disorder)  -     busPIRone (BUSPAR) 15 MG tablet; Take 1 tablet by mouth 3 (Three) Times a Day.  Dispense: 90 tablet; Refill: 2  -     sertraline (ZOLOFT) 100 MG tablet; Take 1.5 tablets by mouth Daily.  Dispense: 45 tablet; Refill: 2    3. Substance induced mood disorder  -     sertraline (ZOLOFT) 100 MG tablet; Take 1.5 tablets by mouth Daily.  Dispense: 45 tablet; Refill: 2      -Patient having a multitude of life stressors including some medical stressors but seems to be coping as best she can and symptoms are not out of context to the situation.  We will hold off on any changes for now  -Reviewed previous documentation  -Reviewed most recent  available labs  -Continue Zoloft 150 mg p.o. daily for anxiety, PMDD, and mood disorder related to Keppra   -Continue BuSpar to 15 mg 3 times daily for anxiety  -Encouraged to follow-up with neurology as scheduled  -Encouraged to follow-up with primary care provider for other medical comorbidities including lupus and factor V Leiden deficiency   -Approximate appointment time 9:20 AM to 9:40 AM via video visit        Visit Diagnoses:    ICD-10-CM ICD-9-CM   1. Generalized anxiety disorder  F41.1 300.02   2. PMDD (premenstrual dysphoric disorder)  F32.81 625.4   3. Substance induced mood disorder  F19.94 292.84         TREATMENT PLAN/GOALS: Continue supportive psychotherapy efforts and medications as indicated. Treatment and medication options discussed during today's visit. Patient ackowledged and verbally consented to continue with current treatment plan and was educated on the importance of compliance with treatment and follow-up appointments.    MEDICATION ISSUES:    Discussed medication options and treatment plan of prescribed medication as well as the risks, benefits, and side effects including potential falls, possible impaired driving and metabolic adversities among others. Patient is agreeable to call the office with any worsening of symptoms or onset of side effects. Patient is agreeable to call 911 or go to the nearest ER should he/she begin having SI/HI. No medication side effects or related complaints today.     MEDS ORDERED DURING VISIT:  New Medications Ordered This Visit   Medications    busPIRone (BUSPAR) 15 MG tablet     Sig: Take 1 tablet by mouth 3 (Three) Times a Day.     Dispense:  90 tablet     Refill:  2     This prescription was filled on 8/21/2024. Any refills authorized will be placed on file.    sertraline (ZOLOFT) 100 MG tablet     Sig: Take 1.5 tablets by mouth Daily.     Dispense:  45 tablet     Refill:  2     This prescription was filled on 10/31/2024. Any refills authorized will be  placed on file.       Return in about 3 months (around 3/10/2025).             This document has been electronically signed by Nithin Tyler MD  December 10, 2024 09:28 EST

## 2024-12-17 RX ORDER — LEVOTHYROXINE SODIUM 25 UG/1
25 TABLET ORAL DAILY
Qty: 30 TABLET | Refills: 2 | Status: SHIPPED | OUTPATIENT
Start: 2024-12-17

## 2025-03-04 ENCOUNTER — TELEMEDICINE (OUTPATIENT)
Dept: PSYCHIATRY | Facility: CLINIC | Age: 50
End: 2025-03-04
Payer: COMMERCIAL

## 2025-03-04 DIAGNOSIS — F41.1 GENERALIZED ANXIETY DISORDER: ICD-10-CM

## 2025-03-04 DIAGNOSIS — F32.81 PMDD (PREMENSTRUAL DYSPHORIC DISORDER): ICD-10-CM

## 2025-03-04 DIAGNOSIS — F19.94 SUBSTANCE INDUCED MOOD DISORDER: ICD-10-CM

## 2025-03-04 PROCEDURE — 1159F MED LIST DOCD IN RCRD: CPT | Performed by: PSYCHIATRY & NEUROLOGY

## 2025-03-04 PROCEDURE — 99214 OFFICE O/P EST MOD 30 MIN: CPT | Performed by: PSYCHIATRY & NEUROLOGY

## 2025-03-04 PROCEDURE — 1160F RVW MEDS BY RX/DR IN RCRD: CPT | Performed by: PSYCHIATRY & NEUROLOGY

## 2025-03-04 RX ORDER — SERTRALINE HYDROCHLORIDE 100 MG/1
150 TABLET, FILM COATED ORAL DAILY
Qty: 45 TABLET | Refills: 2 | Status: SHIPPED | OUTPATIENT
Start: 2025-03-04

## 2025-03-04 RX ORDER — BUSPIRONE HYDROCHLORIDE 15 MG/1
15 TABLET ORAL 3 TIMES DAILY
Qty: 90 TABLET | Refills: 2 | Status: SHIPPED | OUTPATIENT
Start: 2025-03-04

## 2025-03-04 NOTE — PROGRESS NOTES
Subjective   Emily Zavala is a 49 y.o. female who presents today for follow up    Chief Complaint: Anxiety    This provider is located at [facility], [address] [place] [address]. The Patient is seen remotely at her home, using [employer]. Patient is being seen via telehealth and stated they are in a secure environment for this session. The patient’s condition being diagnosed/treated is appropriate for telemedicine. The provider identified himself as well as his credentials.   The patient gave consent to be seen remotely, and when consent is given they understand that the consent allows for patient identifiable information to be sent to a third party as needed.   They may refuse to be seen remotely at any time. The electronic data is encrypted and password protected, and the patient has been advised of the potential risks to privacy not withstanding such measures      History of Present Illness:   History of Present Illness  The patient is a 49-year-old female presenting today for anxiety.    She reports that her lupus has been exacerbated for several months, with a decrease in platelet count despite an increase in Plaquenil dosage. This has necessitated consultation with an oncologist and extensive laboratory testing to rule out other potential causes. She has a scheduled appointment with a rheumatologist on 03/27/2024.    Her follow-up mammogram yielded benign results, but she is advised to continue monitoring every 6 months.    She is managing her anxiety well with her current medication regimen and continues to utilize [facility] for her prescriptions.    MEDICATIONS  Current: Plaquenil        The following portions of the patient's history were reviewed and updated as appropriate: allergies, current medications, past family history, past medical history, past social history, past surgical history and problem list.      Past Medical History:  Past Medical History:   Diagnosis Date     Bulging lumbar disc     Cancer     Skiin Cancer - vagina    Chronic ITP (idiopathic thrombocytopenia)     Clotting disorder     Elevated cholesterol     Factor 5 Leiden mutation, heterozygous     Heart murmur     History of transfusion     FFP    Hypertension     ocassionally    Lupus     Pulmonary emboli     Seizures        Social History:  Social History     Socioeconomic History    Marital status:    Tobacco Use    Smoking status: Never    Smokeless tobacco: Never   Vaping Use    Vaping status: Never Used   Substance and Sexual Activity    Alcohol use: No    Drug use: No    Sexual activity: Defer       Family History:  Family History   Problem Relation Age of Onset    Breast cancer Maternal Aunt    Patient has a sister with bipolar schizophrenia.    Past Surgical History:  Past Surgical History:   Procedure Laterality Date    ABDOMINAL SURGERY       SECTION      x2    D & C HYSTEROSCOPY ENDOMETRIAL ABLATION N/A 2017    Procedure: DILATATION AND CURETTAGE, HYSTEROSCOPY, ABLATION(PT.GETTING PLATELETS DAY BEFORE SURGERY) OUT PT.SURGERY TO CHECK PLATELETS DAY OF SURGERY;  Surgeon: Gail White DO;  Location: [facility];  Service:     FRACTURE SURGERY Left     left foot ligament repair    LEEP      x2    SKIN BIOPSY      VAGINAL BIOPSY N/A 2019    Procedure: EXCISION OF VAGINAL LESION;  Surgeon: Jared Morgan MD;  Location: [facility];  Service: Obstetrics/Gynecology       Problem List:  Patient Active Problem List   Diagnosis    Lupus    Factor V Leiden    Hypocortisolism    Memory loss, short term    Fatigue       Allergy:   Allergies   Allergen Reactions    Lamotrigine Hives    Bactrim [Sulfamethoxazole-Trimethoprim] Other (See Comments)     Pt stated it gives her a yeast infection    Sulfamethoxazole Rash    Trimethoprim Rash        Current Medications:   Current Outpatient Medications   Medication Sig Dispense Refill    ascorbic acid (VITAMIN C) 1000 MG tablet Take 1  tablet by mouth Daily.      Biotin 1000 MCG tablet Take 1,000 mcg by mouth Daily.      busPIRone (BUSPAR) 15 MG tablet Take 1 tablet by mouth 3 (Three) Times a Day. 90 tablet 2    cholecalciferol (VITAMIN D3) 400 units tablet Take 1 tablet by mouth Daily.      clonazePAM (KlonoPIN) 0.5 MG tablet       Cyanocobalamin (VITAMIN B-12) 5000 MCG sublingual tablet one tablet daily      famotidine (PEPCID) 20 MG tablet       folic acid (FOLVITE) 1 MG tablet Take 1 tablet by mouth 2 (Two) Times a Day.      lacosamide (VIMPAT) 100 MG tablet tablet Take 1 tablet by mouth Every 12 (Twelve) Hours.      levothyroxine (SYNTHROID, LEVOTHROID) 25 MCG tablet TAKE ONE TABLET BY MOUTH EVERY DAY 30 tablet 2    loratadine (CLARITIN) 10 MG tablet Take 1 tablet by mouth Daily.      montelukast (SINGULAIR) 10 MG tablet Take 1 tablet by mouth every night at bedtime.      mycophenolate (CELLCEPT) 200 MG/ML suspension Take 7.5 mL by mouth Every 12 (Twelve) Hours.      Nplate 125 MCG injection INJECT 100 MCG SUBCUTANEOUSLY EVERY 2 WEEKS AS DIRECTED WHEN PLATELLETS LESS THAN 30,000      pantoprazole (PROTONIX) 20 MG EC tablet 1 tablet.      potassium chloride 10 MEQ CR tablet Take 1 tablet by mouth Daily.      sertraline (ZOLOFT) 100 MG tablet Take 1.5 tablets by mouth Daily. 45 tablet 2     No current facility-administered medications for this visit.       Review of Symptoms:    Review of Systems   Constitutional:  Positive for fatigue. Negative for activity change, appetite change, chills and fever.   HENT: Negative.     Eyes: Negative.    Respiratory: Negative.  Negative for shortness of breath.    Cardiovascular: Negative.  Negative for chest pain.   Gastrointestinal:  Negative for diarrhea, nausea and vomiting.   Endocrine: Negative.    Genitourinary: Negative.    Musculoskeletal: Negative.    Skin: Negative.    Allergic/Immunologic: Negative.    Neurological:  Positive for dizziness and seizures. Negative for confusion.   Hematological:   Bruises/bleeds easily.        Factor V    Psychiatric/Behavioral:  Positive for decreased concentration. Negative for agitation, behavioral problems, dysphoric mood, hallucinations, self-injury, sleep disturbance, suicidal ideas, negative for hyperactivity, depressed mood and stress. The patient is not nervous/anxious (intermittent episodes).          Physical Exam:   not currently breastfeeding. Video Visit    Appearance: CF of stated age in no acute distress  Gait, Station, Strength: Video Visit     Mental Status Exam:     Mental Status exam performed 03/04/2025  and patient shows no significant changes from previous exam.     Hygiene:   good  Cooperation:  Cooperative  Eye Contact:  Good  Psychomotor Behavior:  Appropriate  Affect:  Full range  Mood:  Stressed and dysphoric (situational and medical)  - maintaining   Hopelessness: Denies  Speech:  Normal  Thought Process:  Goal directed and Linear  Thought Content:  Normal  Suicidal:  None  Homicidal:  None  Hallucinations:  None  Delusion:  None  Memory:  Intact  Orientation:  Person, Place, Time and Situation  Reliability:  good  Insight:  Good  Judgement:  Good  Impulse Control:  Good  Physical/Medical Issues:  Yes Factor V Leiden, seizure disorder, and lupus        Lab Results:   No visits with results within 1 Month(s) from this visit.   Latest known visit with results is:   Office Visit on 06/10/2024   Component Date Value Ref Range Status    Glucose 06/10/2024 80  65 - 99 mg/dL Final    BUN 06/10/2024 12  6 - 20 mg/dL Final    Creatinine 06/10/2024 0.95  0.57 - 1.00 mg/dL Final    Sodium 06/10/2024 142  136 - 145 mmol/L Final    Potassium 06/10/2024 4.5  3.5 - 5.2 mmol/L Final    Chloride 06/10/2024 106  98 - 107 mmol/L Final    CO2 06/10/2024 25.1  22.0 - 29.0 mmol/L Final    Calcium 06/10/2024 9.4  8.6 - 10.5 mg/dL Final    Total Protein 06/10/2024 7.0  6.0 - 8.5 g/dL Final    Albumin 06/10/2024 4.4  3.5 - 5.2 g/dL Final    ALT (SGPT) 06/10/2024 18  1 -  33 U/L Final    AST (SGOT) 06/10/2024 20  1 - 32 U/L Final    Alkaline Phosphatase 06/10/2024 59  39 - 117 U/L Final    Total Bilirubin 06/10/2024 0.4  0.0 - 1.2 mg/dL Final    Globulin 06/10/2024 2.6  gm/dL Final    A/G Ratio 06/10/2024 1.7  g/dL Final    BUN/Creatinine Ratio 06/10/2024 12.6  7.0 - 25.0 Final    Anion Gap 06/10/2024 10.9  5.0 - 15.0 mmol/L Final    eGFR 06/10/2024 75.0  >60.0 mL/min/1.73 Final       Assessment & Plan   Diagnoses and all orders for this visit:    1. Generalized anxiety disorder  -     busPIRone (BUSPAR) 15 MG tablet; Take 1 tablet by mouth 3 (Three) Times a Day.  Dispense: 90 tablet; Refill: 2  -     sertraline (ZOLOFT) 100 MG tablet; Take 1.5 tablets by mouth Daily.  Dispense: 45 tablet; Refill: 2    2. PMDD (premenstrual dysphoric disorder)  -     busPIRone (BUSPAR) 15 MG tablet; Take 1 tablet by mouth 3 (Three) Times a Day.  Dispense: 90 tablet; Refill: 2  -     sertraline (ZOLOFT) 100 MG tablet; Take 1.5 tablets by mouth Daily.  Dispense: 45 tablet; Refill: 2    3. Substance induced mood disorder  -     sertraline (ZOLOFT) 100 MG tablet; Take 1.5 tablets by mouth Daily.  Dispense: 45 tablet; Refill: 2        -Patient maintaining stability.  She says mood changes related to lupus and other medical treatments but feels that she is able to manage fairly well given the context of the situation  -Reviewed previous documentation  -Reviewed most recent available labs  -Continue Zoloft 150 mg p.o. daily for anxiety, PMDD, and mood disorder related to Keppra   -Continue BuSpar to 15 mg 3 times daily for anxiety  -Encouraged to follow-up with neurology as scheduled  -Encouraged to follow-up with primary care provider for other medical comorbidities including lupus and factor V Leiden deficiency   -Approximate appointment time 9:30 AM to 9:45 AM via video visit    Visit Diagnoses:    ICD-10-CM ICD-9-CM   1. Generalized anxiety disorder  F41.1 300.02   2. PMDD (premenstrual dysphoric  disorder)  F32.81 625.4   3. Substance induced mood disorder  F19.94 292.84           TREATMENT PLAN/GOALS: Continue supportive psychotherapy efforts and medications as indicated. Treatment and medication options discussed during today's visit. Patient ackowledged and verbally consented to continue with current treatment plan and was educated on the importance of compliance with treatment and follow-up appointments.    MEDICATION ISSUES:    Discussed medication options and treatment plan of prescribed medication as well as the risks, benefits, and side effects including potential falls, possible impaired driving and metabolic adversities among others. Patient is agreeable to call the office with any worsening of symptoms or onset of side effects. Patient is agreeable to call 911 or go to the nearest ER should he/she begin having SI/HI. No medication side effects or related complaints today.     MEDS ORDERED DURING VISIT:  New Medications Ordered This Visit   Medications    busPIRone (BUSPAR) 15 MG tablet     Sig: Take 1 tablet by mouth 3 (Three) Times a Day.     Dispense:  90 tablet     Refill:  2     This prescription was filled on 8/21/2024. Any refills authorized will be placed on file.    sertraline (ZOLOFT) 100 MG tablet     Sig: Take 1.5 tablets by mouth Daily.     Dispense:  45 tablet     Refill:  2     This prescription was filled on 10/31/2024. Any refills authorized will be placed on file.     Patient or patient representative verbalized consent for the use of Ambient Listening during the visit with  Nithin Tyler MD for chart documentation. 3/4/2025  09:48 EST    Return in about 3 months (around 6/4/2025).             This document has been electronically signed by Nithin Tyler MD  March 4, 2025 09:32 EST

## 2025-03-06 DIAGNOSIS — F32.81 PMDD (PREMENSTRUAL DYSPHORIC DISORDER): ICD-10-CM

## 2025-03-06 DIAGNOSIS — F19.94 SUBSTANCE INDUCED MOOD DISORDER: ICD-10-CM

## 2025-03-06 DIAGNOSIS — F41.1 GENERALIZED ANXIETY DISORDER: ICD-10-CM

## 2025-03-11 ENCOUNTER — OFFICE VISIT (OUTPATIENT)
Dept: ENDOCRINOLOGY | Facility: CLINIC | Age: 50
End: 2025-03-11
Payer: COMMERCIAL

## 2025-03-11 VITALS
HEART RATE: 70 BPM | BODY MASS INDEX: 21.5 KG/M2 | SYSTOLIC BLOOD PRESSURE: 125 MMHG | OXYGEN SATURATION: 99 % | DIASTOLIC BLOOD PRESSURE: 74 MMHG | WEIGHT: 133.2 LBS

## 2025-03-11 DIAGNOSIS — E27.49 HYPOCORTISOLISM: Primary | ICD-10-CM

## 2025-03-11 PROCEDURE — 80053 COMPREHEN METABOLIC PANEL: CPT | Performed by: NURSE PRACTITIONER

## 2025-03-11 PROCEDURE — 84443 ASSAY THYROID STIM HORMONE: CPT | Performed by: NURSE PRACTITIONER

## 2025-03-11 PROCEDURE — 84439 ASSAY OF FREE THYROXINE: CPT | Performed by: NURSE PRACTITIONER

## 2025-03-11 PROCEDURE — 82533 TOTAL CORTISOL: CPT | Performed by: NURSE PRACTITIONER

## 2025-03-11 NOTE — PROGRESS NOTES
Chief Complaint   Patient presents with    Follow-up     Hypocortisolism          Referring Provider  No ref. provider found     HPI   Emily Zavala is a 49 y.o. female had concerns including Follow-up (Hypocortisolism/).   Hypocortisolism.     She reports that she is having a flare of her lupus right now.  She has been started on Nplate for this.  She reports that this now has caused her platelet counts supplement but they are not replacing these as listed then they would continue to do sure the platelet counts as they are giving them to her.  She reports that she has not been feeling well and is trying to figure out the underlying cause of it. She is currently going to be seeing oncology as well.   She is not currently taking any Prednisone.  Since then she has had serial labs that were in range.  She denies any new symptoms related to her cortisol levels.  She reports that she is doing well.    Cortisol History:  Her symptoms started about 2 to 3 years ago.  She initially had increased weight loss, decreased appetite, muscle weakness, extreme fatigue, hypotension, bradycardia, lightheadedness, salt cravings, hypoglycemia, nausea, diarrhea, abdominal pain, joint/muscle pain, irritability, depression, some mild body hair loss and some mild sexual dysfunction.  She has had intensive testing that determined that she has hypocortisolism.  She was started on 5 mg prednisone daily to which she has noticed a decrease in all of her symptoms.  Her main concern at this time is the fact that she has seizures monthly and is concerned that this may be associated with this.  She has been evaluated by neurology at .    Past Medical History:   Diagnosis Date    ADHD (attention deficit hyperactivity disorder)     Anxiety     Bulging lumbar disc     Cancer     Skiin Cancer - vagina    Chronic ITP (idiopathic thrombocytopenia)     Clotting disorder     Depression     Elevated cholesterol     Factor 5 Leiden mutation, heterozygous      Head injury     Heart murmur     History of transfusion     FFP    Hypertension     ocassionally    Lupus     Peripheral neuropathy     Polycystic ovary syndrome     Pulmonary emboli     Seizures      Past Surgical History:   Procedure Laterality Date    ABDOMINAL SURGERY       SECTION      x2    COLONOSCOPY      D & C HYSTEROSCOPY ENDOMETRIAL ABLATION N/A 2017    Procedure: DILATATION AND CURETTAGE, HYSTEROSCOPY, ABLATION(PT.GETTING PLATELETS DAY BEFORE SURGERY) OUT PT.SURGERY TO CHECK PLATELETS DAY OF SURGERY;  Surgeon: Gail White DO;  Location: [facility] OR;  Service:     FRACTURE SURGERY Left     left foot ligament repair    LEEP      x2    SKIN BIOPSY      VAGINAL BIOPSY N/A 2019    Procedure: EXCISION OF VAGINAL LESION;  Surgeon: Jared Morgan MD;  Location: [facility] OR;  Service: Obstetrics/Gynecology      Family History   Problem Relation Age of Onset    Breast cancer Maternal Aunt     Bipolar disorder Sister       Social History     Socioeconomic History    Marital status:    Tobacco Use    Smoking status: Never    Smokeless tobacco: Never   Vaping Use    Vaping status: Never Used   Substance and Sexual Activity    Alcohol use: No    Drug use: Never    Sexual activity: Yes     Partners: Male     Birth control/protection: Tubal ligation      Allergies   Allergen Reactions    Lamotrigine Hives    Bactrim [Sulfamethoxazole-Trimethoprim] Other (See Comments)     Pt stated it gives her a yeast infection    Sulfamethoxazole Rash    Trimethoprim Rash      Current Outpatient Medications on File Prior to Visit   Medication Sig Dispense Refill    ascorbic acid (VITAMIN C) 1000 MG tablet Take 1 tablet by mouth Daily.      Biotin 1000 MCG tablet Take 1,000 mcg by mouth Daily.      busPIRone (BUSPAR) 15 MG tablet Take 1 tablet by mouth 3 (Three) Times a Day. 90 tablet 2    cholecalciferol (VITAMIN D3) 400 units tablet Take 1 tablet by mouth Daily.      clonazePAM (KlonoPIN)  0.5 MG tablet       Cyanocobalamin (VITAMIN B-12) 5000 MCG sublingual tablet one tablet daily      famotidine (PEPCID) 20 MG tablet       folic acid (FOLVITE) 1 MG tablet Take 1 tablet by mouth 2 (Two) Times a Day.      lacosamide (VIMPAT) 100 MG tablet tablet Take 1 tablet by mouth Every 12 (Twelve) Hours.      levothyroxine (SYNTHROID, LEVOTHROID) 25 MCG tablet TAKE ONE TABLET BY MOUTH EVERY DAY 30 tablet 2    loratadine (CLARITIN) 10 MG tablet Take 1 tablet by mouth Daily.      montelukast (SINGULAIR) 10 MG tablet Take 1 tablet by mouth every night at bedtime.      mycophenolate (CELLCEPT) 200 MG/ML suspension Take 7.5 mL by mouth Every 12 (Twelve) Hours.      Nplate 125 MCG injection INJECT 100 MCG SUBCUTANEOUSLY EVERY 2 WEEKS AS DIRECTED WHEN PLATELLETS LESS THAN 30,000      pantoprazole (PROTONIX) 20 MG EC tablet 1 tablet.      potassium chloride 10 MEQ CR tablet Take 1 tablet by mouth Daily.      sertraline (ZOLOFT) 100 MG tablet Take 1.5 tablets by mouth Daily. 45 tablet 2     No current facility-administered medications on file prior to visit.        The following portions of the patient's history were reviewed and updated as appropriate: allergies, current medications, past family history, past medical history, past social history, past surgical history and problem list.    Review of Systems   Constitutional:  Positive for fatigue and unexpected weight loss.   Eyes: Negative.    Gastrointestinal:  Positive for abdominal pain, diarrhea and nausea.   Neurological:  Positive for seizures, light-headedness and memory problem.   All other systems reviewed and are negative.    /74 (BP Location: Right arm, Patient Position: Sitting, Cuff Size: Adult)   Pulse 70   Wt 60.4 kg (133 lb 3.2 oz)   SpO2 99%   BMI 21.50 kg/m²      Physical Exam  Vitals reviewed.   Constitutional:       Appearance: Normal appearance.   Eyes:      Extraocular Movements: Extraocular movements intact.   Neck:      Thyroid:  "Thyromegaly present. No thyroid mass or thyroid tenderness.   Cardiovascular:      Rate and Rhythm: Normal rate.   Pulmonary:      Effort: Pulmonary effort is normal.   Neurological:      General: No focal deficit present.      Mental Status: She is alert and oriented to person, place, and time.   Psychiatric:         Mood and Affect: Mood normal.         Behavior: Behavior normal.         Thought Content: Thought content normal.         Judgment: Judgment normal.       CMP:  Lab Results   Component Value Date    Glucose Negative 10/21/2024    Glucose 78 10/21/2024    BUN 12 06/10/2024    BUN/Creatinine Ratio 12.6 06/10/2024    Creatinine 0.95 06/10/2024    Ketones, UA Negative 07/25/2022    CO2 25.1 06/10/2024    Calcium 9.4 06/10/2024    Albumin 4.4 06/10/2024    AST (SGOT) 20 06/10/2024    ALT (SGPT) 18 06/10/2024     Lipid Panel:  No results found for: \"CHOL\", \"TRIG\", \"HDL\", \"VLDL\", \"LDL\"  HbA1c:  No components found for: \"HGBA1C\"  Glucose:  Lab Results   Component Value Date    POCGLU 78 10/21/2024     TSH:  Lab Results   Component Value Date    TSH 3.890 06/06/2023       Assessment and Plan    Diagnoses and all orders for this visit:    1. Hypocortisolism (Primary)  Assessment & Plan:  -Patient appears asymptomatic and denies any symptoms in office today.  -Will obtain labs today to monitor electrolytes.  Her BP and weight are stable. Will obtain cortisol levels and TFT's to ensure that those are still in range and do not require treatment.   -Follow-up in 6 months.    Orders:  -     Comprehensive Metabolic Panel  -     TSH  -     T4, free  -     Cortisol         Return in about 6 months (around 9/11/2025) for Follow-up appointment. The patient was instructed to contact the clinic with any interval questions or concerns.    This document has been electronically signed by RUSLAN Charles  March 11, 2025 15:30 EDT  Endocrinology    Please note that portions of this document were completed with a voice " recognition program. Efforts were made to edit the dictations, but occasionally words are mis-transcribed.

## 2025-03-11 NOTE — ASSESSMENT & PLAN NOTE
-Patient appears asymptomatic and denies any symptoms in office today.  -Will obtain labs today to monitor electrolytes.  Her BP and weight are stable. Will obtain cortisol levels and TFT's to ensure that those are still in range and do not require treatment.   -Follow-up in 6 months.

## 2025-03-12 LAB
ALBUMIN SERPL-MCNC: 4.3 G/DL (ref 3.5–5.2)
ALBUMIN/GLOB SERPL: 1.5 G/DL
ALP SERPL-CCNC: 77 U/L (ref 39–117)
ALT SERPL W P-5'-P-CCNC: 29 U/L (ref 1–33)
ANION GAP SERPL CALCULATED.3IONS-SCNC: 11 MMOL/L (ref 5–15)
AST SERPL-CCNC: 32 U/L (ref 1–32)
BILIRUB SERPL-MCNC: 0.5 MG/DL (ref 0–1.2)
BUN SERPL-MCNC: 15 MG/DL (ref 6–20)
BUN/CREAT SERPL: 14.7 (ref 7–25)
CALCIUM SPEC-SCNC: 9.7 MG/DL (ref 8.6–10.5)
CHLORIDE SERPL-SCNC: 105 MMOL/L (ref 98–107)
CO2 SERPL-SCNC: 24 MMOL/L (ref 22–29)
CORTIS SERPL-MCNC: 5.84 MCG/DL
CREAT SERPL-MCNC: 1.02 MG/DL (ref 0.57–1)
EGFRCR SERPLBLD CKD-EPI 2021: 68.4 ML/MIN/1.73
GLOBULIN UR ELPH-MCNC: 2.9 GM/DL
GLUCOSE SERPL-MCNC: 85 MG/DL (ref 65–99)
POTASSIUM SERPL-SCNC: 4.9 MMOL/L (ref 3.5–5.2)
PROT SERPL-MCNC: 7.2 G/DL (ref 6–8.5)
SODIUM SERPL-SCNC: 140 MMOL/L (ref 136–145)
T4 FREE SERPL-MCNC: 1.05 NG/DL (ref 0.92–1.68)
TSH SERPL DL<=0.05 MIU/L-ACNC: 2.52 UIU/ML (ref 0.27–4.2)

## 2025-04-08 DIAGNOSIS — F32.81 PMDD (PREMENSTRUAL DYSPHORIC DISORDER): ICD-10-CM

## 2025-04-08 DIAGNOSIS — F41.1 GENERALIZED ANXIETY DISORDER: ICD-10-CM

## 2025-04-08 RX ORDER — LEVOTHYROXINE SODIUM 25 UG/1
25 TABLET ORAL DAILY
Qty: 30 TABLET | Refills: 2 | Status: SHIPPED | OUTPATIENT
Start: 2025-04-08

## 2025-05-27 ENCOUNTER — TELEMEDICINE (OUTPATIENT)
Dept: PSYCHIATRY | Facility: CLINIC | Age: 50
End: 2025-05-27
Payer: COMMERCIAL

## 2025-05-27 DIAGNOSIS — F32.81 PMDD (PREMENSTRUAL DYSPHORIC DISORDER): ICD-10-CM

## 2025-05-27 DIAGNOSIS — F41.1 GENERALIZED ANXIETY DISORDER: ICD-10-CM

## 2025-05-27 DIAGNOSIS — F19.94 SUBSTANCE INDUCED MOOD DISORDER: ICD-10-CM

## 2025-05-27 PROCEDURE — 1159F MED LIST DOCD IN RCRD: CPT | Performed by: PSYCHIATRY & NEUROLOGY

## 2025-05-27 PROCEDURE — 1160F RVW MEDS BY RX/DR IN RCRD: CPT | Performed by: PSYCHIATRY & NEUROLOGY

## 2025-05-27 PROCEDURE — 99214 OFFICE O/P EST MOD 30 MIN: CPT | Performed by: PSYCHIATRY & NEUROLOGY

## 2025-05-27 RX ORDER — BUSPIRONE HYDROCHLORIDE 15 MG/1
15 TABLET ORAL 3 TIMES DAILY
Qty: 90 TABLET | Refills: 2 | OUTPATIENT
Start: 2025-05-27

## 2025-05-27 RX ORDER — BUSPIRONE HYDROCHLORIDE 15 MG/1
15 TABLET ORAL 3 TIMES DAILY
Qty: 90 TABLET | Refills: 2 | Status: SHIPPED | OUTPATIENT
Start: 2025-05-27

## 2025-05-27 RX ORDER — SERTRALINE HYDROCHLORIDE 100 MG/1
150 TABLET, FILM COATED ORAL DAILY
Qty: 45 TABLET | Refills: 2 | Status: SHIPPED | OUTPATIENT
Start: 2025-05-27

## 2025-05-27 RX ORDER — SERTRALINE HYDROCHLORIDE 100 MG/1
150 TABLET, FILM COATED ORAL DAILY
Qty: 45 TABLET | Refills: 2 | OUTPATIENT
Start: 2025-05-27

## 2025-05-27 NOTE — PROGRESS NOTES
Subjective   Emily Zavala is a 49 y.o. female who presents today for follow up    Chief Complaint: Anxiety    This provider is located at [facility], [address] [place] [address]. The Patient is seen remotely at her home, using N[employer]ION. Patient is being seen via telehealth and stated they are in a secure environment for this session. The patient’s condition being diagnosed/treated is appropriate for telemedicine. The provider identified himself as well as his credentials.   The patient gave consent to be seen remotely, and when consent is given they understand that the consent allows for patient identifiable information to be sent to a third party as needed.   They may refuse to be seen remotely at any time. The electronic data is encrypted and password protected, and the patient has been advised of the potential risks to privacy not withstanding such measures      History of Present Illness:   History of Present Illness    The patient is a 49-year-old female presenting today for follow-up for depression and anxiety via virtual visit.    She reports a stable mood despite recent stressors. She is not experiencing any adverse effects from her current psychiatric medications.    Social History:  - She has sons and is trying to figure out summer plans with them.    She had a low platelet count of 1 about a month ago and had to stay in the [place]. She received platelet transfusions and was on steroids, which she has been off for about 2 weeks. She is receiving Benlysta treatments for lupus. Her platelet count has been in the 20s for a couple of weeks and was 60 yesterday. They are checking her platelets twice a week now to ensure they do not drop too low again.        The following portions of the patient's history were reviewed and updated as appropriate: allergies, current medications, past family history, past medical history, past social history, past surgical history and problem list.      Past Medical  History:  Past Medical History:   Diagnosis Date    ADHD (attention deficit hyperactivity disorder)     Anxiety     Bulging lumbar disc     Cancer     Skiin Cancer - vagina    Chronic ITP (idiopathic thrombocytopenia)     Clotting disorder     Depression     Elevated cholesterol     Factor 5 Leiden mutation, heterozygous     Head injury     Heart murmur     History of transfusion     FFP    Hypertension     ocassionally    Lupus     Peripheral neuropathy     Polycystic ovary syndrome     Pulmonary emboli     Seizures        Social History:  Social History     Socioeconomic History    Marital status:    Tobacco Use    Smoking status: Never    Smokeless tobacco: Never   Vaping Use    Vaping status: Never Used   Substance and Sexual Activity    Alcohol use: No    Drug use: Never    Sexual activity: Yes     Partners: Male     Birth control/protection: Tubal ligation       Family History:  Family History   Problem Relation Age of Onset    Breast cancer Maternal Aunt     Bipolar disorder Sister    Patient has a sister with bipolar schizophrenia.    Past Surgical History:  Past Surgical History:   Procedure Laterality Date    ABDOMINAL SURGERY       SECTION      x2    COLONOSCOPY      D & C HYSTEROSCOPY ENDOMETRIAL ABLATION N/A 2017    Procedure: DILATATION AND CURETTAGE, HYSTEROSCOPY, ABLATION(PT.GETTING PLATELETS DAY BEFORE SURGERY) OUT PT.SURGERY TO CHECK PLATELETS DAY OF SURGERY;  Surgeon: Gail White DO;  Location: [facility] OR;  Service:     FRACTURE SURGERY Left     left foot ligament repair    LEEP      x2    SKIN BIOPSY      VAGINAL BIOPSY N/A 2019    Procedure: EXCISION OF VAGINAL LESION;  Surgeon: Jared Morgan MD;  Location: [facility] OR;  Service: Obstetrics/Gynecology       Problem List:  Patient Active Problem List   Diagnosis    Lupus    Factor V Leiden    Hypocortisolism    Memory loss, short term    Fatigue       Allergy:   Allergies   Allergen Reactions     Lamotrigine Hives    Bactrim [Sulfamethoxazole-Trimethoprim] Other (See Comments)     Pt stated it gives her a yeast infection    Sulfamethoxazole Rash    Trimethoprim Rash        Current Medications:   Current Outpatient Medications   Medication Sig Dispense Refill    ascorbic acid (VITAMIN C) 1000 MG tablet Take 1 tablet by mouth Daily.      Biotin 1000 MCG tablet Take 1,000 mcg by mouth Daily.      busPIRone (BUSPAR) 15 MG tablet Take 1 tablet by mouth 3 (Three) Times a Day. 90 tablet 2    cholecalciferol (VITAMIN D3) 400 units tablet Take 1 tablet by mouth Daily.      clonazePAM (KlonoPIN) 0.5 MG tablet       Cyanocobalamin (VITAMIN B-12) 5000 MCG sublingual tablet one tablet daily      famotidine (PEPCID) 20 MG tablet       folic acid (FOLVITE) 1 MG tablet Take 1 tablet by mouth 2 (Two) Times a Day.      lacosamide (VIMPAT) 100 MG tablet tablet Take 1 tablet by mouth Every 12 (Twelve) Hours.      levothyroxine (SYNTHROID, LEVOTHROID) 25 MCG tablet TAKE ONE TABLET BY MOUTH EVERY DAY 30 tablet 2    loratadine (CLARITIN) 10 MG tablet Take 1 tablet by mouth Daily.      montelukast (SINGULAIR) 10 MG tablet Take 1 tablet by mouth every night at bedtime.      mycophenolate (CELLCEPT) 200 MG/ML suspension Take 7.5 mL by mouth Every 12 (Twelve) Hours.      Nplate 125 MCG injection INJECT 100 MCG SUBCUTANEOUSLY EVERY 2 WEEKS AS DIRECTED WHEN PLATELLETS LESS THAN 30,000      pantoprazole (PROTONIX) 20 MG EC tablet 1 tablet.      potassium chloride 10 MEQ CR tablet Take 1 tablet by mouth Daily.      sertraline (ZOLOFT) 100 MG tablet Take 1.5 tablets by mouth Daily. 45 tablet 2     No current facility-administered medications for this visit.       Review of Symptoms:    Review of Systems   Constitutional:  Positive for fatigue. Negative for activity change, appetite change, chills and fever.   HENT: Negative.     Eyes: Negative.    Respiratory: Negative.  Negative for shortness of breath.    Cardiovascular: Negative.   Negative for chest pain.   Gastrointestinal:  Negative for diarrhea, nausea and vomiting.   Endocrine: Negative.    Genitourinary: Negative.    Musculoskeletal: Negative.    Skin: Negative.    Allergic/Immunologic: Negative.    Neurological:  Positive for dizziness and seizures. Negative for confusion.   Hematological:  Bruises/bleeds easily.        Factor V    Psychiatric/Behavioral:  Positive for decreased concentration. Negative for agitation, behavioral problems, dysphoric mood, hallucinations, self-injury, sleep disturbance, suicidal ideas, negative for hyperactivity, depressed mood and stress. The patient is not nervous/anxious (intermittent episodes).          Physical Exam:   not currently breastfeeding. Video Visit    Appearance: CF of stated age in no acute distress  Gait, Station, Strength: Video Visit     Mental Status Exam:     Hygiene:   good  Cooperation:  Cooperative  Eye Contact:  Good  Psychomotor Behavior:  Appropriate  Affect:  Full range  Mood: normal - maintaining   Hopelessness: Denies  Speech:  Normal  Thought Process:  Goal directed and Linear  Thought Content:  Normal  Suicidal:  None  Homicidal:  None  Hallucinations:  None  Delusion:  None  Memory:  Intact  Orientation:  Person, Place, Time and Situation  Reliability:  good  Insight:  Good  Judgement:  Good  Impulse Control:  Good  Physical/Medical Issues:  Yes Factor V Leiden, seizure disorder, and lupus        Lab Results:   No visits with results within 1 Month(s) from this visit.   Latest known visit with results is:   Office Visit on 03/11/2025   Component Date Value Ref Range Status    Glucose 03/11/2025 85  65 - 99 mg/dL Final    BUN 03/11/2025 15  6 - 20 mg/dL Final    Creatinine 03/11/2025 1.02 (H)  0.57 - 1.00 mg/dL Final    Sodium 03/11/2025 140  136 - 145 mmol/L Final    Potassium 03/11/2025 4.9  3.5 - 5.2 mmol/L Final    Chloride 03/11/2025 105  98 - 107 mmol/L Final    CO2 03/11/2025 24.0  22.0 - 29.0 mmol/L Final    Calcium  03/11/2025 9.7  8.6 - 10.5 mg/dL Final    Total Protein 03/11/2025 7.2  6.0 - 8.5 g/dL Final    Albumin 03/11/2025 4.3  3.5 - 5.2 g/dL Final    ALT (SGPT) 03/11/2025 29  1 - 33 U/L Final    AST (SGOT) 03/11/2025 32  1 - 32 U/L Final    Alkaline Phosphatase 03/11/2025 77  39 - 117 U/L Final    Total Bilirubin 03/11/2025 0.5  0.0 - 1.2 mg/dL Final    Globulin 03/11/2025 2.9  gm/dL Final    A/G Ratio 03/11/2025 1.5  g/dL Final    BUN/Creatinine Ratio 03/11/2025 14.7  7.0 - 25.0 Final    Anion Gap 03/11/2025 11.0  5.0 - 15.0 mmol/L Final    eGFR 03/11/2025 68.4  >60.0 mL/min/1.73 Final    TSH 03/11/2025 2.520  0.270 - 4.200 uIU/mL Final    Free T4 03/11/2025 1.05  0.92 - 1.68 ng/dL Final    Cortisol 03/11/2025 5.84    mcg/dL Final       Assessment & Plan   Diagnoses and all orders for this visit:    1. Generalized anxiety disorder  -     busPIRone (BUSPAR) 15 MG tablet; Take 1 tablet by mouth 3 (Three) Times a Day.  Dispense: 90 tablet; Refill: 2  -     sertraline (ZOLOFT) 100 MG tablet; Take 1.5 tablets by mouth Daily.  Dispense: 45 tablet; Refill: 2    2. PMDD (premenstrual dysphoric disorder)  -     busPIRone (BUSPAR) 15 MG tablet; Take 1 tablet by mouth 3 (Three) Times a Day.  Dispense: 90 tablet; Refill: 2  -     sertraline (ZOLOFT) 100 MG tablet; Take 1.5 tablets by mouth Daily.  Dispense: 45 tablet; Refill: 2    3. Substance induced mood disorder  Comments:  Medication treatment  Orders:  -     sertraline (ZOLOFT) 100 MG tablet; Take 1.5 tablets by mouth Daily.  Dispense: 45 tablet; Refill: 2          -Patient overall maintaining and reports from a psychiatric standpoint she is doing fairly well  -Reviewed previous documentation  -Reviewed most recent available labs  -Continue Zoloft 150 mg p.o. daily for anxiety, PMDD, and mood disorder related to Keppra   -Continue BuSpar to 15 mg 3 times daily for anxiety  -Encouraged to follow-up with neurology as scheduled  -Encouraged to follow-up with primary care provider  for other medical comorbidities including lupus and factor V Leiden deficiency   -Approximate appointment time 2:15 PM to 2:30 PM via video visit    Visit Diagnoses:    ICD-10-CM ICD-9-CM   1. Generalized anxiety disorder  F41.1 300.02   2. PMDD (premenstrual dysphoric disorder)  F32.81 625.4   3. Substance induced mood disorder  F19.94 292.84             TREATMENT PLAN/GOALS: Continue supportive psychotherapy efforts and medications as indicated. Treatment and medication options discussed during today's visit. Patient ackowledged and verbally consented to continue with current treatment plan and was educated on the importance of compliance with treatment and follow-up appointments.    MEDICATION ISSUES:    Discussed medication options and treatment plan of prescribed medication as well as the risks, benefits, and side effects including potential falls, possible impaired driving and metabolic adversities among others. Patient is agreeable to call the office with any worsening of symptoms or onset of side effects. Patient is agreeable to call 911 or go to the nearest ER should he/she begin having SI/HI. No medication side effects or related complaints today.     MEDS ORDERED DURING VISIT:  New Medications Ordered This Visit   Medications    busPIRone (BUSPAR) 15 MG tablet     Sig: Take 1 tablet by mouth 3 (Three) Times a Day.     Dispense:  90 tablet     Refill:  2     This prescription was filled on 8/21/2024. Any refills authorized will be placed on file.    sertraline (ZOLOFT) 100 MG tablet     Sig: Take 1.5 tablets by mouth Daily.     Dispense:  45 tablet     Refill:  2     This prescription was filled on 10/31/2024. Any refills authorized will be placed on file.     Patient or patient representative verbalized consent for the use of Ambient Listening during the visit with  Nithin Tyler MD for chart documentation. 5/27/2025  09:48 EST    Return in about 3 months (around 8/27/2025).             This document  has been electronically signed by Nithin Tyler MD  May 27, 2025 14:21 EDT

## 2025-07-07 RX ORDER — LEVOTHYROXINE SODIUM 25 UG/1
25 TABLET ORAL DAILY
Qty: 30 TABLET | Refills: 2 | Status: SHIPPED | OUTPATIENT
Start: 2025-07-07

## (undated) DEVICE — COR MINOR LITHOTOMY: Brand: MEDLINE INDUSTRIES, INC.

## (undated) DEVICE — PAD SANI MAXI W/ADHS SNG WRP 11IN

## (undated) DEVICE — CYSTO/BLADDER IRRIGATION SET, REGULATING CLAMP

## (undated) DEVICE — PROB ABL ENDOMTRL NOVASURE/G3 IMPEDENCE 1P/U

## (undated) DEVICE — ENCORE® LATEX MICRO SIZE 6.5, STERILE LATEX POWDER-FREE SURGICAL GLOVE: Brand: ENCORE

## (undated) DEVICE — GLV SURG PREMIERPRO MIC LTX PF SZ7.5 BRN

## (undated) DEVICE — PAD GRND REM POLYHESIVE A/ DISP

## (undated) DEVICE — PENCL E/S HNDSWCH PUSHBTN HOLSTR 10FT